# Patient Record
Sex: FEMALE | Race: WHITE | Employment: OTHER | URBAN - METROPOLITAN AREA
[De-identification: names, ages, dates, MRNs, and addresses within clinical notes are randomized per-mention and may not be internally consistent; named-entity substitution may affect disease eponyms.]

---

## 2017-01-03 ENCOUNTER — RADIANT APPOINTMENT (OUTPATIENT)
Dept: ULTRASOUND IMAGING | Facility: CLINIC | Age: 57
End: 2017-01-03
Attending: INTERNAL MEDICINE
Payer: COMMERCIAL

## 2017-01-03 ENCOUNTER — ALLIED HEALTH/NURSE VISIT (OUTPATIENT)
Dept: FAMILY MEDICINE | Facility: CLINIC | Age: 57
End: 2017-01-03
Payer: COMMERCIAL

## 2017-01-03 DIAGNOSIS — Z01.818 PREOPERATIVE EXAMINATION: Primary | ICD-10-CM

## 2017-01-03 DIAGNOSIS — E04.1 THYROID NODULE: ICD-10-CM

## 2017-01-03 PROCEDURE — 99207 ZZC NO CHARGE NURSE ONLY: CPT

## 2017-01-03 PROCEDURE — 88173 CYTOPATH EVAL FNA REPORT: CPT | Mod: 90 | Performed by: FAMILY MEDICINE

## 2017-01-03 PROCEDURE — 99000 SPECIMEN HANDLING OFFICE-LAB: CPT | Performed by: FAMILY MEDICINE

## 2017-01-03 PROCEDURE — 00000102 ZZHCL STATISTIC CYTO WRIGHT STAIN TC: Performed by: FAMILY MEDICINE

## 2017-01-03 PROCEDURE — 76942 ECHO GUIDE FOR BIOPSY: CPT | Performed by: STUDENT IN AN ORGANIZED HEALTH CARE EDUCATION/TRAINING PROGRAM

## 2017-01-03 PROCEDURE — 10022 US BIOPSY THYROID FINE NEEDLE ASPIRATION: CPT | Performed by: STUDENT IN AN ORGANIZED HEALTH CARE EDUCATION/TRAINING PROGRAM

## 2017-01-03 PROCEDURE — 93000 ELECTROCARDIOGRAM COMPLETE: CPT

## 2017-01-03 NOTE — PROGRESS NOTES
Patient was seen in the ultrasound today for a fine needle aspiration of a Right thyroid nodule.   Procedure was performed with NO complications.  Pain at start of procedure 0/10. Pain at end of procedure 0/10  Patient d/c with home care and follow-up instructions.  Lab to order: cfna

## 2017-01-04 LAB — COPATH REPORT: NORMAL

## 2017-01-10 ENCOUNTER — TRANSFERRED RECORDS (OUTPATIENT)
Dept: HEALTH INFORMATION MANAGEMENT | Facility: CLINIC | Age: 57
End: 2017-01-10

## 2017-01-10 NOTE — PROGRESS NOTES
Quick Note:    Dear Davida,     The final pathology is nondiagnostic due to lack of enough follicular cells. I am asking pathologist to see if they can review slide again but if this is truly nondiagnostic, I would prefer you repeat the FNA but with pathologist present at the time of biopsy to make sure they get enough sample. I will help coordinate this once I hear back from the pathology team.     Natalie Gutiérrez MD  [id number]  Endocrinology Service    ______

## 2017-01-12 ENCOUNTER — MYC MEDICAL ADVICE (OUTPATIENT)
Dept: ENDOCRINOLOGY | Facility: CLINIC | Age: 57
End: 2017-01-12

## 2017-01-12 DIAGNOSIS — E03.9 HYPOTHYROIDISM, UNSPECIFIED TYPE: Primary | ICD-10-CM

## 2017-01-13 ENCOUNTER — TELEPHONE (OUTPATIENT)
Dept: ENDOCRINOLOGY | Facility: CLINIC | Age: 57
End: 2017-01-13

## 2017-01-13 DIAGNOSIS — E04.1 THYROID NODULE: Primary | ICD-10-CM

## 2017-01-13 NOTE — TELEPHONE ENCOUNTER
"Lab report  Test was performed in [facility]   10/6/2016    TSH 2.6  Free T4 1  Free T3 2.9  Anti-  [id number]  FSH 64  LH  45  Cortisol 6.7  IGF-I 61  Total testosterone 2.9  Estradiol < 5     CBC 4.7  Hemoglobin 13.4  Platelets 214  Sodium 144  Potassium 4.1  Calcium 9.7  ALT 10  Creatinine 0.7  BUN 23  MALIK was negative    Other labs were scanned      Plan  Patient was called  ith subclinical hypothyroidism, treatment with levothyroxine or thyroid hormone is not indicated.  However patient states that the change has been \"night and day\" and I recommended  Following with her physician for that in that case.     we discussed difficulties with thyroid hormone extracts that may arise in the future and recommended changing it to levothyroxine.      She has nondiagnostic thyroid nodule biopsy.  We discussed about options of repeating it which is standard of care but she would like to obtain a sort of ultrasound for now.  Repeat biopsy with on-site pathologist was offered.    Thyroid ultrasound was ordered for her    Natalie Gutiérrez MD  [id number]  Endocrinology Service    "

## 2017-02-07 ENCOUNTER — AMBULATORY - HEALTHEAST (OUTPATIENT)
Dept: CARDIOLOGY | Facility: CLINIC | Age: 57
End: 2017-02-07

## 2017-02-20 ENCOUNTER — TRANSFERRED RECORDS (OUTPATIENT)
Dept: HEALTH INFORMATION MANAGEMENT | Facility: CLINIC | Age: 57
End: 2017-02-20

## 2017-02-28 ENCOUNTER — COMMUNICATION - HEALTHEAST (OUTPATIENT)
Dept: CARDIOLOGY | Facility: CLINIC | Age: 57
End: 2017-02-28

## 2017-02-28 DIAGNOSIS — I10 ESSENTIAL HYPERTENSION: ICD-10-CM

## 2017-03-01 ENCOUNTER — COMMUNICATION - HEALTHEAST (OUTPATIENT)
Dept: CARDIOLOGY | Facility: CLINIC | Age: 57
End: 2017-03-01

## 2017-03-01 DIAGNOSIS — I10 ESSENTIAL HYPERTENSION: ICD-10-CM

## 2017-03-10 ENCOUNTER — COMMUNICATION - HEALTHEAST (OUTPATIENT)
Dept: TELEHEALTH | Facility: CLINIC | Age: 57
End: 2017-03-10

## 2017-03-10 ENCOUNTER — AMBULATORY - HEALTHEAST (OUTPATIENT)
Dept: CARDIOLOGY | Facility: CLINIC | Age: 57
End: 2017-03-10

## 2017-03-10 ENCOUNTER — OFFICE VISIT - HEALTHEAST (OUTPATIENT)
Dept: CARDIOLOGY | Facility: CLINIC | Age: 57
End: 2017-03-10

## 2017-03-10 ENCOUNTER — TRANSFERRED RECORDS (OUTPATIENT)
Dept: HEALTH INFORMATION MANAGEMENT | Facility: CLINIC | Age: 57
End: 2017-03-10

## 2017-03-10 DIAGNOSIS — R07.9 CHEST PAIN, UNSPECIFIED TYPE: ICD-10-CM

## 2017-03-10 DIAGNOSIS — I10 ESSENTIAL HYPERTENSION: ICD-10-CM

## 2017-03-10 ASSESSMENT — MIFFLIN-ST. JEOR: SCORE: 1096.59

## 2017-04-17 ENCOUNTER — TRANSFERRED RECORDS (OUTPATIENT)
Dept: HEALTH INFORMATION MANAGEMENT | Facility: CLINIC | Age: 57
End: 2017-04-17

## 2017-04-21 ENCOUNTER — TELEPHONE (OUTPATIENT)
Dept: FAMILY MEDICINE | Facility: CLINIC | Age: 57
End: 2017-04-21

## 2017-04-21 NOTE — TELEPHONE ENCOUNTER
Patient was notified there was an order in there from Dr. Gutiérrez for US thyroid placed in 1/2017.  Patient was given the number to schedule.  Advised patient if she has any difficulty scheduling to call the clinic back.     Linda SANDERS RN

## 2017-04-21 NOTE — TELEPHONE ENCOUNTER
Reason for Call: Request for an order or referral:    Order or referral being requested: US Referral    Date needed: by next week    Has the patient been seen by the PCP for this problem? YES    Additional comments: She was suppose to have a thyroid US done first couple of months in 2017.  She has not sone that yet and is wondering if she can get an order to have this done at [place].  Please advise.    Phone number Patient can be reached at:  Home number on file [phone] (home)    Best Time:  any    Can we leave a detailed message on this number?  YES    Call taken on 4/21/2017 at 8:58 AM by Belkys Mera

## 2017-04-24 ENCOUNTER — HOSPITAL ENCOUNTER (OUTPATIENT)
Dept: ULTRASOUND IMAGING | Facility: CLINIC | Age: 57
Discharge: HOME | End: 2017-04-24
Attending: INTERNAL MEDICINE | Admitting: INTERNAL MEDICINE
Payer: COMMERCIAL

## 2017-04-24 DIAGNOSIS — E04.1 THYROID NODULE: ICD-10-CM

## 2017-04-24 PROCEDURE — 76536 US EXAM OF HEAD AND NECK: CPT

## 2017-04-25 NOTE — PROGRESS NOTES
Thyroid nodules are stable at this time and would recommend following this annually.     Natalie Gutiérrez MD  [id number]  Endocrinology Service

## 2017-05-31 ENCOUNTER — COMMUNICATION - HEALTHEAST (OUTPATIENT)
Dept: CARDIOLOGY | Facility: CLINIC | Age: 57
End: 2017-05-31

## 2017-05-31 DIAGNOSIS — I10 ESSENTIAL HYPERTENSION: ICD-10-CM

## 2017-06-01 ENCOUNTER — COMMUNICATION - HEALTHEAST (OUTPATIENT)
Dept: CARDIOLOGY | Facility: CLINIC | Age: 57
End: 2017-06-01

## 2017-06-01 DIAGNOSIS — I10 ESSENTIAL HYPERTENSION: ICD-10-CM

## 2017-06-21 DIAGNOSIS — N95.2 ATROPHIC VAGINITIS: ICD-10-CM

## 2017-06-21 NOTE — TELEPHONE ENCOUNTER
estradiol POWD      Last Written Prescription Date: 6/13/16  Last Fill Quantity: 45, # refills: 1  Last Office Visit with FMSRINATH, CALIX[facility] or [facility] prescribing provider: 12/15/16  Next 5 appointments (look out 90 days)     Jun 23, 2017  2:00 PM CDT   Return Visit with Natalie Gutiérrez MD   [facility]    [address] [place] [id number]   [phone]                   BP Readings from Last 3 Encounters:   12/30/16 125/72   12/15/16 142/89   10/10/16 120/84     Date of last Breast Exam: 6/30/16

## 2017-06-26 ENCOUNTER — COMMUNICATION - HEALTHEAST (OUTPATIENT)
Dept: CARDIOLOGY | Facility: CLINIC | Age: 57
End: 2017-06-26

## 2017-06-26 DIAGNOSIS — I10 ESSENTIAL HYPERTENSION: ICD-10-CM

## 2017-06-26 NOTE — TELEPHONE ENCOUNTER
"Davida said that she is using it vaginally twice a week. She said that  \"I am good with that.\"  Pardeep Flores RN    "

## 2017-06-26 NOTE — TELEPHONE ENCOUNTER
Please just ask if she is still taking this with her new homeopathic hormonal treatment? How often is she using it vaginally?  Paulina Zamora PA-C

## 2017-06-27 RX ORDER — ESTRADIOL 100 %
POWDER (GRAM) MISCELLANEOUS
Qty: 45 G | Refills: 1 | Status: SHIPPED | OUTPATIENT
Start: 2017-06-27 | End: 2018-06-27

## 2017-07-26 ENCOUNTER — TRANSFERRED RECORDS (OUTPATIENT)
Dept: HEALTH INFORMATION MANAGEMENT | Facility: CLINIC | Age: 57
End: 2017-07-26

## 2017-08-09 ENCOUNTER — OFFICE VISIT (OUTPATIENT)
Dept: FAMILY MEDICINE | Facility: CLINIC | Age: 57
End: 2017-08-09
Payer: COMMERCIAL

## 2017-08-09 VITALS
TEMPERATURE: 98.6 F | HEART RATE: 68 BPM | SYSTOLIC BLOOD PRESSURE: 123 MMHG | BODY MASS INDEX: 22.67 KG/M2 | WEIGHT: 123.2 LBS | HEIGHT: 62 IN | OXYGEN SATURATION: 98 % | DIASTOLIC BLOOD PRESSURE: 76 MMHG

## 2017-08-09 DIAGNOSIS — M20.12 HALLUX VALGUS, LEFT: ICD-10-CM

## 2017-08-09 DIAGNOSIS — Z01.818 PREOP GENERAL PHYSICAL EXAM: Primary | ICD-10-CM

## 2017-08-09 DIAGNOSIS — I20.1 PRINZMETAL ANGINA (H): ICD-10-CM

## 2017-08-09 DIAGNOSIS — M77.42 METATARSALGIA OF LEFT FOOT: ICD-10-CM

## 2017-08-09 DIAGNOSIS — I10 ESSENTIAL HYPERTENSION: ICD-10-CM

## 2017-08-09 LAB
ANION GAP SERPL CALCULATED.3IONS-SCNC: 5 MMOL/L (ref 3–14)
BUN SERPL-MCNC: 22 MG/DL (ref 7–30)
CALCIUM SERPL-MCNC: 8.8 MG/DL (ref 8.5–10.1)
CHLORIDE SERPL-SCNC: 105 MMOL/L (ref 94–109)
CO2 SERPL-SCNC: 30 MMOL/L (ref 20–32)
CREAT SERPL-MCNC: 0.85 MG/DL (ref 0.52–1.04)
GFR SERPL CREATININE-BSD FRML MDRD: 69 ML/MIN/1.7M2
GLUCOSE SERPL-MCNC: 86 MG/DL (ref 70–99)
HGB BLD-MCNC: 13.8 G/DL (ref 11.7–15.7)
POTASSIUM SERPL-SCNC: 3.8 MMOL/L (ref 3.4–5.3)
SODIUM SERPL-SCNC: 140 MMOL/L (ref 133–144)

## 2017-08-09 PROCEDURE — 36415 COLL VENOUS BLD VENIPUNCTURE: CPT | Performed by: PHYSICIAN ASSISTANT

## 2017-08-09 PROCEDURE — 93000 ELECTROCARDIOGRAM COMPLETE: CPT | Performed by: PHYSICIAN ASSISTANT

## 2017-08-09 PROCEDURE — 85018 HEMOGLOBIN: CPT | Performed by: PHYSICIAN ASSISTANT

## 2017-08-09 PROCEDURE — 99214 OFFICE O/P EST MOD 30 MIN: CPT | Performed by: PHYSICIAN ASSISTANT

## 2017-08-09 PROCEDURE — 80048 BASIC METABOLIC PNL TOTAL CA: CPT | Performed by: PHYSICIAN ASSISTANT

## 2017-08-09 NOTE — MR AVS SNAPSHOT
After Visit Summary   8/9/2017    Davida Mo    MRN: [id number]           Patient Information     Date Of Birth          1960        Visit Information        Provider Department      8/9/2017 1:00 PM Paulina Zamora PA-C [facility]        Today's Diagnoses     Preop general physical exam    -  1    Hallux valgus, left        Metatarsalgia of left foot        Prinzmetal angina (H)        Essential hypertension          Care Instructions      Before Your Surgery      Call your surgeon if there is any change in your health. This includes signs of a cold or flu (such as a sore throat, runny nose, cough, rash or fever).    Do not smoke, drink alcohol or take over the counter medicine (unless your surgeon or primary care doctor tells you to) for the 24 hours before and after surgery.    If you take prescribed drugs: Follow your doctor s orders about which medicines to take and which to stop until after surgery.    Eating and drinking prior to surgery: follow the instructions from your surgeon    Take a shower or bath the night before surgery. Use the soap your surgeon gave you to gently clean your skin. If you do not have soap from your surgeon, use your regular soap. Do not shave or scrub the surgery site.  Wear clean pajamas and have clean sheets on your bed.           Follow-ups after your visit        Who to contact     Normal or non-critical lab and imaging results will be communicated to you by [employer], letter or phone within 4 business days after the clinic has received the results. If you do not hear from us within 7 days, please contact the clinic through [employer] or phone. If you have a critical or abnormal lab result, we will notify you by phone as soon as possible.  Submit refill requests through [employer] or call your pharmacy and they will forward the refill request to us. Please allow 3 business days for your refill to be completed.          If you need to speak with a  " for additional information , please call: [phone]             Additional Information About Your Visit        MyChart Information     [employer] gives you secure access to your electronic health record. If you see a primary care provider, you can also send messages to your care team and make appointments. If you have questions, please call your primary care clinic.  If you do not have a primary care provider, please call [phone] and they will assist you.        Care EveryWhere ID     This is your Care EveryWhere ID. This could be used by other organizations to access your [facility] medical records  [id number]        Your Vitals Were     Pulse Temperature Height Pulse Oximetry BMI (Body Mass Index)       68 98.6  F (37  C) (Tympanic) 5' 1.5\" (1.562 m) 98% 22.9 kg/m2        Blood Pressure from Last 3 Encounters:   08/09/17 123/76   12/30/16 125/72   12/15/16 142/89    Weight from Last 3 Encounters:   08/09/17 123 lb 3.2 oz (55.9 kg)   12/30/16 126 lb 14.4 oz (57.6 kg)   12/15/16 124 lb 1.6 oz (56.3 kg)              We Performed the Following     EKG 12-lead complete w/read - Clinics        Primary Care Provider Office Phone # Fax #    Paulina Zamora PA-C [phone] [address] [facility] [address]        Equal Access to Services     [facility] : Hadii aad ku hadasho Soomaali, waaxda luqadaha, qaybta kaalmada adeegyada, suzanne lynch haydeb okeefe . So [facility] [phone].    ATENCIÓN: Si habla español, tiene a yan disposición servicios gratuitos de asistencia lingüística. Llame al [phone].    We comply with applicable federal civil rights laws and [place] laws. We do not discriminate on the basis of race, color, national origin, age, disability sex, sexual orientation or gender identity.            Thank you!     Thank you for choosing [facility]  for your care. Our goal is always to provide you with excellent care. Hearing back from " our patients is one way we can continue to improve our services. Please take a few minutes to complete the written survey that you may receive in the mail after your visit with us. Thank you!             Your Updated Medication List - Protect others around you: Learn how to safely use, store and throw away your medicines at www.disposemymeds.org.          This list is accurate as of: 8/9/17  1:50 PM.  Always use your most recent med list.                   Brand Name Dispense Instructions for use Diagnosis    albuterol 108 (90 BASE) MCG/ACT Inhaler    PROAIR HFA/PROVENTIL HFA/VENTOLIN HFA    1 Inhaler    Inhale 2 puffs into the lungs every 6 hours as needed for shortness of breath / dyspnea or wheezing    Cough       aspirin 81 MG EC tablet     90 tablet    Take 1 tablet (81 mg) by mouth daily        diltiazem 120 MG 24 hr capsule     30 capsule    Take 1 capsule (120 mg) by mouth daily    Essential hypertension       estradiol Powd     45 g    bio identical compounded cream 0.75MG/GM apply 0.5MG per vagina every other day    Atrophic vaginitis       metoprolol 50 MG 24 hr tablet    TOPROL-XL          NEW MED      100 mg 5-HTP 2 tablets at bedtime        nitroGLYcerin 0.4 MG sublingual tablet    NITROSTAT    25 tablet    Place 1 tablet (0.4 mg) under the tongue every 5 minutes as needed for chest pain    Prinzmetal angina (H)       * NONFORMULARY      Take 3 tablets by mouth daily Bone Strength        * NONFORMULARY           OMEGA-3 FISH OIL PO      Take 1 tsp. by mouth Daily in winter        OVER-THE-COUNTER      Lung Bronchial Sinus, Dr. Jesus by natural Factors        PROBIOTIC DAILY PO      85 billion live cultures,33 probiotic strains per capsule        STATIN NOT PRESCRIBED (INTENTIONAL)     0 each    1 each At Bedtime Statin not prescribed intentionally due to Allergy to statin - okay to stop by cardiology    Prinzmetal angina (H)       Thyroid 16.25 MG Tabs      Nature-thyroid        Ubiquinol 200 MG Caps       Take 100 mg by mouth once        VITAMIN K2-VITAMIN D3 PO      3 drops daily        * Notice:  This list has 2 medication(s) that are the same as other medications prescribed for you. Read the directions carefully, and ask your doctor or other care provider to review them with you.

## 2017-08-09 NOTE — PROGRESS NOTES
[facility]  [address] [facility] [address]  [phone]  Dept: [phone]    PRE-OP EVALUATION:  Today's date: 2017    Davida Mo (: 1960) presents for pre-operative evaluation assessment as requested by Dr. Mckinley Maki.  She requires evaluation and anesthesia risk assessment prior to undergoing surgery/procedure for treatment of Left Bunion .  Proposed procedure: Remove hardware Left bunion    Date of Surgery/ Procedure: 2017  Time of Surgery/ Procedure: 12:30 pm  Hospital/Surgical Facility:[facility]   Fax # [phone], [phone]   Primary Physician: Paulina Zamora  Type of Anesthesia Anticipated: General, Scalene Block    Patient has a Health Care Directive or Living Will:  NO     1. YES, heart attack - Do you have a history of heart attack, stroke, stent, bypass or surgery on an artery in the head, neck, heart or legs?  2. NO - Do you ever have any pain or discomfort in your chest?  3. NO - Do you have a history of  Heart Failure?  4. NO - Are you troubled by shortness of breath when: walking on the level, up a slight hill or at night?  5. NO - Do you currently have a cold, bronchitis or other respiratory infection?  6. NO - Do you have a cough, shortness of breath or wheezing?  7. NO - Do you sometimes get pains in the calves of your legs when you walk?  8. NO - Do you or anyone in your family have previous history of blood clots?  9. NO - Do you or does anyone in your family have a serious bleeding problem such as prolonged bleeding following surgeries or cuts?  10. NO - Have you ever had problems with anemia or been told to take iron pills?  11. NO - Have you had any abnormal blood loss such as black, tarry or bloody stools, or abnormal vaginal bleeding?  12. NO - Have you ever had a blood transfusion?  13. NO - Have you or any of your relatives ever had problems with anesthesia?  14. NO - Do you have sleep apnea, excessive snoring or  daytime drowsiness?  15. NO - Do you have any prosthetic heart valves?  16. NO - Do you have prosthetic joints?  17. NO - Is there any chance that you may be pregnant?      HPI:                                                      Brief HPI related to upcoming procedure: history of hallux valgus repair January 2017, needs removal of hardware due to pain    She is taking toprol 50 mg - feels better at this dose  From cardiology note 3/10/17:  Assessment/Recommendations   Patient with known history of wall motion normality is consistent with myocardial injury and a suspicion of coronary dissection in the obtuse system. She has been stable of late but a little bit fatigued. Her blood pressure is borderline today but I have asked her to cut her metoprolol in half and take 2 blood pressures each week for the next couple weeks and let us know where she is. If her blood pressure creeps up she will go back to her previous dose of metoprolol and I have asked her to give us a call. We could also substitute a different medication.    History of Present Illness   Ms. Davida BARRAGAN is a 56 y.o. female with known unusual coronary disease. She had documentation of a wall motion normality with no evidence of coronary artery disease and on repeat angiography had a suspicion of a dissection. She has been feeling well this past year and has not had any chest discomfort. She has had some foot surgery and some difficulties with panic attacks but that seems to be better with some natural medications that she is taking. Her cholesterols also come down with the addition of some natural thyroid replacement that she is taking. She has not had any syncope, near syncope, chest discomfort, orthopnea, paroxysmal nocturnal dyspnea, peripheral edema. . A little bit fatigued and wonders if she can come off of her metoprolol.    See problem list for active medical problems.  Problems all longstanding and stable, except as noted/documented.  See  ROS for pertinent symptoms related to these conditions.                                                                                                  .  HYPERTENSION - Patient has longstanding history of mod-severe HTN , currently denies any symptoms referable to elevated blood pressure. Specifically denies chest pain, palpitations, dyspnea, orthopnea, PND or peripheral edema. Blood pressure readings have been in normal range. Current medication regimen is as listed below. Patient denies any side effects of medication.                                                                                                                                                                                     HYPERLIPIDEMIA - Patient has a long history of Hyperlipidemiawith recent good control. She has discussed with cardiologist who was okay with no statin therapy.                                                                                                                                                     .  DEPRESSION - Patient has a long history of Depression of moderate severity requiring medication for control with recent symptoms being improved..Current symptoms of depression include none.                 CAD - patient has had dissection/Prinzmetal 2014. Patient denies recent chest pain or NTG use, denies exercise induced dyspnea or PND. CT angiogram 2014 - No atherosclerotic coronary artery disease. Small branch of OM has appearance suspicious of an old healed dissection. Normal LVEF 60% and EDP 4mmHg    MEDICAL HISTORY:                                                    Patient Active Problem List    Diagnosis Date Noted     Essential hypertension 09/24/2014     Priority: High     Prinzmetal angina (H) 06/30/2016     Priority: Medium     Thyroid nodule 08/24/2015     Priority: Medium     Chest pain 09/23/2014     Priority: Medium     Anxiety 12/11/2012     Priority: Medium     Lentigo 12/11/2012     Priority:  Medium     Seborrheic keratosis 12/11/2012     Priority: Medium     Atrophic vaginitis 03/18/2014     Priority: Low      Past Medical History:   Diagnosis Date     Arthritis      Depression     on meds from 96 to 06. no symptoms have returned     Hypertension 11/09     NSTEMI (non-ST elevated myocardial infarction) (H) 2011,9/23/2014    see care everywhere     Prinzmetal angina (H)     first heart attack. prinzmetal angina diagnosis     Past Surgical History:   Procedure Laterality Date     ABDOMEN SURGERY  1987    tubal ligation     TUBAL LIGATION       Current Outpatient Prescriptions   Medication Sig Dispense Refill     estradiol POWD bio identical compounded cream 0.75MG/GM apply 0.5MG per vagina every other day 45 g 1     Thyroid 16.25 MG TABS Nature-thyroid       Probiotic Product (PROBIOTIC DAILY PO) 85 billion live cultures,33 probiotic strains per capsule       metoprolol (TOPROL-XL) 50 MG 24 hr tablet        OVER-THE-COUNTER Lung Bronchial Sinus, Dr. Jesus by natural Factors       diltiazem 120 MG 24 hr CD capsule Take 1 capsule (120 mg) by mouth daily 30 capsule 0     aspirin 81 MG EC tablet Take 1 tablet (81 mg) by mouth daily 90 tablet 3     NONFORMULARY Take 3 tablets by mouth daily Bone Strength       Omega-3 Fatty Acids (OMEGA-3 FISH OIL PO) Take 1 tsp. by mouth Daily in winter       STATIN NOT PRESCRIBED, INTENTIONAL, 1 each At Bedtime Statin not prescribed intentionally due to Allergy to statin - okay to stop by cardiology 0 each 0     Vitamin D-Vitamin K (VITAMIN K2-VITAMIN D3 PO) 3 drops daily       albuterol (PROAIR HFA/PROVENTIL HFA/VENTOLIN HFA) 108 (90 BASE) MCG/ACT Inhaler Inhale 2 puffs into the lungs every 6 hours as needed for shortness of breath / dyspnea or wheezing 1 Inhaler 0     NEW  mg 5-HTP 2 tablets at bedtime       NONFORMULARY        nitroglycerin (NITROSTAT) 0.4 MG SL tablet Place 1 tablet (0.4 mg) under the tongue every 5 minutes as needed for chest pain 25 tablet 1  "    Ubiquinol 200 MG CAPS Take 100 mg by mouth once        OTC products: None, except as noted above. She will stop fish oil and baby aspirin 5 days prior    Allergies   Allergen Reactions     Remeron [Mirtazapine] Rash     Patient states that medication made her extremly tired, felt like she could not function until late afternoon.  Also noted having rash all over body      Latex Allergy: NO    Social History   Substance Use Topics     Smoking status: Former Smoker     Years: 10.00     Types: Cigarettes     Start date: 9/10/1972     Quit date: 9/23/1991     Smokeless tobacco: Not on file     Alcohol use Yes      Comment: Occasionally     History   Drug Use No       REVIEW OF SYSTEMS:                                                    C: NEGATIVE for fever, chills, change in weight  I: NEGATIVE for worrisome rashes, moles or lesions  E: NEGATIVE for vision changes or irritation  E/M: NEGATIVE for ear, mouth and throat problems  R: NEGATIVE for significant cough or SOB  B: NEGATIVE for masses, tenderness or discharge  CV: NEGATIVE for chest pain, palpitations or peripheral edema  GI: NEGATIVE for nausea, abdominal pain, heartburn, or change in bowel habits  : NEGATIVE for frequency, dysuria, or hematuria  M: NEGATIVE for significant arthralgias or myalgia POSITIVE left foot pain  N: NEGATIVE for weakness, dizziness or paresthesias  E: NEGATIVE for temperature intolerance, skin/hair changes  H: NEGATIVE for bleeding problems  P: NEGATIVE for changes in mood or affect    EXAM:                                                    /76 (BP Location: Right arm, Patient Position: Sitting, Cuff Size: Adult Regular)  Pulse 68  Temp 98.6  F (37  C) (Tympanic)  Ht 5' 1.5\" (1.562 m)  Wt 123 lb 3.2 oz (55.9 kg)  SpO2 98%  BMI 22.9 kg/m2    GENERAL APPEARANCE: healthy, alert and no distress     EYES: EOMI,  PERRL     HENT: ear canals and TM's normal and nose and mouth without ulcers or lesions     NECK: no adenopathy, " no asymmetry, masses, or scars and thyroid normal to palpation     RESP: lungs clear to auscultation - no rales, rhonchi or wheezes     CV: regular rates and rhythm, normal S1 S2, no S3 or S4 and no murmur, click or rub     ABDOMEN:  soft, nontender, no HSM or masses and bowel sounds normal     MS: extremities normal- no gross deformities noted, no evidence of inflammation in joints, FROM in all extremities.     SKIN: no suspicious lesions or rashes     NEURO: Normal strength and tone, sensory exam grossly normal, mentation intact and speech normal     PSYCH: mentation appears normal. and affect normal/bright     LYMPHATICS: No axillary, cervical, or supraclavicular nodes    DIAGNOSTICS:                                                      EKG: appears normal, NSR, normal axis, normal intervals, no acute ST/T changes c/w ischemia, no LVH by voltage criteria, unchanged from previous tracings  Labs Resulted Today:   Results for orders placed or performed in visit on 08/09/17   Hemoglobin   Result Value Ref Range    Hemoglobin 13.8 11.7 - 15.7 g/dL   Basic metabolic panel   Result Value Ref Range    Sodium 140 133 - 144 mmol/L    Potassium 3.8 3.4 - 5.3 mmol/L    Chloride 105 94 - 109 mmol/L    Carbon Dioxide 30 20 - 32 mmol/L    Anion Gap 5 3 - 14 mmol/L    Glucose 86 70 - 99 mg/dL    Urea Nitrogen 22 7 - 30 mg/dL    Creatinine 0.85 0.52 - 1.04 mg/dL    GFR Estimate 69 >60 mL/min/1.7m2    GFR Estimate If Black 83 >60 mL/min/1.7m2    Calcium 8.8 8.5 - 10.1 mg/dL     Labs Drawn and in Process:   Unresulted Labs Ordered in the Past 30 Days of this Admission     No orders found for last 61 day(s).          Recent Labs   Lab Test  12/15/16   1214  07/07/16   1013   09/23/14   1540   HGB  13.9   --    --   13.5   PLT   --    --    --   237   NA  140  143   < >  142   POTASSIUM  4.0  3.7   < >  3.7   CR  0.81  0.91   < >  0.89    < > = values in this interval not displayed.        IMPRESSION:                                                     Reason for surgery/procedure: hallux valgus correction / hardware removal  Diagnosis/reason for consult: preoperative exam    The proposed surgical procedure is considered INTERMEDIATE risk.    REVISED CARDIAC RISK INDEX  The patient has the following serious cardiovascular risks for perioperative complications such as (MI, PE, VFib and 3  AV Block):  Coronary Artery Disease (MI, positive stress test, angina, Qs on EKG)  INTERPRETATION: 1 risks: Class II (low risk - 0.9% complication rate)    The patient has the following additional risks for perioperative complications:  No identified additional risks  The 10-year ASCVD risk score (Treyadriana WILDER Jr, et al., 2013) is: 2.2%    Values used to calculate the score:      Age: 56 years      Sex: Female      Is Non- : No      Diabetic: No      Tobacco smoker: No      Systolic Blood Pressure: 123 mmHg      Is BP treated: Yes      HDL Cholesterol: 62 mg/dL      Total Cholesterol: 161 mg/dL      ICD-10-CM    1. Preop general physical exam Z01.818 EKG 12-lead complete w/read - Clinics     Hemoglobin     Basic metabolic panel   2. Hallux valgus, left M20.12 EKG 12-lead complete w/read - Clinics     Hemoglobin   3. Metatarsalgia of left foot M77.42    4. Prinzmetal angina (H) I20.1 EKG 12-lead complete w/read - Clinics   5. Essential hypertension I10 EKG 12-lead complete w/read - Clinics     Basic metabolic panel       RECOMMENDATIONS:                                                        Cardiovascular Risk  Performs 4 METs exercise without symptoms  Patient is already on a Beta Blocker. Continue Betablocker therapy after surgery, using Beta blocker order set as necessary for NPO status.    --Patient is to take all scheduled medications on the day of surgery EXCEPT for modifications listed below.    Anticoagulant or Antiplatelet Medication Use  ASPIRIN: patient was told by surgery to d/c aspirin and fish oil 5 days prior to surgery       APPROVAL GIVEN to proceed with proposed procedure, without further diagnostic evaluation       Signed Electronically by: Paulina Zamora PA-C    Copy of this evaluation report is provided to requesting physician.    [facility] Preop Guidelines

## 2017-09-08 ENCOUNTER — TRANSFERRED RECORDS (OUTPATIENT)
Dept: HEALTH INFORMATION MANAGEMENT | Facility: CLINIC | Age: 57
End: 2017-09-08

## 2017-11-17 ENCOUNTER — COMMUNICATION - HEALTHEAST (OUTPATIENT)
Dept: CARDIOLOGY | Facility: CLINIC | Age: 57
End: 2017-11-17

## 2017-11-17 DIAGNOSIS — I10 ESSENTIAL HYPERTENSION: ICD-10-CM

## 2017-12-13 ENCOUNTER — TELEPHONE (OUTPATIENT)
Dept: FAMILY MEDICINE | Facility: CLINIC | Age: 57
End: 2017-12-13

## 2017-12-13 DIAGNOSIS — E04.1 THYROID NODULE: Primary | ICD-10-CM

## 2017-12-13 NOTE — TELEPHONE ENCOUNTER
"Last year she was followed by endocrinology, after last ultrasound endocrinologist recommended : \"Thyroid nodules are stable at this time and would recommend following this annually. \"  So she is not due to repeat ultrasound until April - I did place the order.   She should have thyroid function testing repeated as well.  Paulina Zamora PA-C   "

## 2017-12-13 NOTE — TELEPHONE ENCOUNTER
Patient notified of all of Kirstin's instructions as noted below. Patient agreed with plan.  Lab only appointment made for 12/15/17.   Pardeep Flores RN

## 2017-12-13 NOTE — TELEPHONE ENCOUNTER
Reason for Call: Request for an order or referral:    Order or referral being requested: Davida calling to request orders for US for her thyroid.  She states that she is past due and has one every 6 months.  Could you please review and place orders if appropriate.  She'd like to be called when orders have been placed.  Thank you..Elizabeth Duffy    Date needed: as soon as possible    Has the patient been seen by the PCP for this problem? Not Applicable    Additional comments: none    Phone number Patient can be reached at:  Home number on file [phone] (home)    Best Time:  Any time    Can we leave a detailed message on this number?  YES    Call taken on 12/13/2017 at 11:09 AM by Elizabeth Duffy

## 2017-12-15 DIAGNOSIS — E04.1 THYROID NODULE: ICD-10-CM

## 2017-12-15 LAB — TSH SERPL DL<=0.005 MIU/L-ACNC: 2.89 MU/L (ref 0.4–4)

## 2017-12-15 PROCEDURE — 36415 COLL VENOUS BLD VENIPUNCTURE: CPT | Performed by: PHYSICIAN ASSISTANT

## 2017-12-15 PROCEDURE — 84443 ASSAY THYROID STIM HORMONE: CPT | Performed by: PHYSICIAN ASSISTANT

## 2018-01-12 ENCOUNTER — COMMUNICATION - HEALTHEAST (OUTPATIENT)
Dept: CARDIOLOGY | Facility: CLINIC | Age: 58
End: 2018-01-12

## 2018-01-12 DIAGNOSIS — I10 ESSENTIAL HYPERTENSION: ICD-10-CM

## 2018-03-08 ENCOUNTER — COMMUNICATION - HEALTHEAST (OUTPATIENT)
Dept: ADMINISTRATIVE | Facility: CLINIC | Age: 58
End: 2018-03-08

## 2018-04-13 ENCOUNTER — COMMUNICATION - HEALTHEAST (OUTPATIENT)
Dept: CARDIOLOGY | Facility: CLINIC | Age: 58
End: 2018-04-13

## 2018-04-25 ENCOUNTER — HOSPITAL ENCOUNTER (OUTPATIENT)
Dept: ULTRASOUND IMAGING | Facility: CLINIC | Age: 58
Discharge: HOME | End: 2018-04-25
Attending: PHYSICIAN ASSISTANT | Admitting: PHYSICIAN ASSISTANT
Payer: COMMERCIAL

## 2018-04-25 DIAGNOSIS — E04.1 THYROID NODULE: ICD-10-CM

## 2018-04-25 PROCEDURE — 76536 US EXAM OF HEAD AND NECK: CPT

## 2018-05-10 ENCOUNTER — COMMUNICATION - HEALTHEAST (OUTPATIENT)
Dept: TELEHEALTH | Facility: CLINIC | Age: 58
End: 2018-05-10

## 2018-05-10 ENCOUNTER — TRANSFERRED RECORDS (OUTPATIENT)
Dept: HEALTH INFORMATION MANAGEMENT | Facility: CLINIC | Age: 58
End: 2018-05-10

## 2018-05-10 ENCOUNTER — OFFICE VISIT - HEALTHEAST (OUTPATIENT)
Dept: CARDIOLOGY | Facility: CLINIC | Age: 58
End: 2018-05-10

## 2018-05-10 DIAGNOSIS — I25.42 DISSECTION OF CORONARY ARTERY: ICD-10-CM

## 2018-05-10 DIAGNOSIS — I10 ESSENTIAL HYPERTENSION: ICD-10-CM

## 2018-05-10 ASSESSMENT — MIFFLIN-ST. JEOR: SCORE: 1093.19

## 2018-05-13 ENCOUNTER — COMMUNICATION - HEALTHEAST (OUTPATIENT)
Dept: CARDIOLOGY | Facility: CLINIC | Age: 58
End: 2018-05-13

## 2018-05-13 DIAGNOSIS — I10 ESSENTIAL HYPERTENSION: ICD-10-CM

## 2018-05-17 PROCEDURE — 82274 ASSAY TEST FOR BLOOD FECAL: CPT | Performed by: PHYSICIAN ASSISTANT

## 2018-05-23 DIAGNOSIS — Z12.11 SPECIAL SCREENING FOR MALIGNANT NEOPLASMS, COLON: ICD-10-CM

## 2018-05-23 LAB — HEMOCCULT STL QL IA: NEGATIVE

## 2018-06-20 ENCOUNTER — COMMUNICATION - HEALTHEAST (OUTPATIENT)
Dept: CARDIOLOGY | Facility: CLINIC | Age: 58
End: 2018-06-20

## 2018-06-20 DIAGNOSIS — I10 ESSENTIAL HYPERTENSION: ICD-10-CM

## 2018-06-22 DIAGNOSIS — E03.9 MYXEDEMA HEART DISEASE: Primary | ICD-10-CM

## 2018-06-22 DIAGNOSIS — I51.9 MYXEDEMA HEART DISEASE: Primary | ICD-10-CM

## 2018-06-22 LAB
T3FREE SERPL-MCNC: 2.3 PG/ML (ref 2.3–4.2)
T4 FREE SERPL-MCNC: 0.75 NG/DL (ref 0.76–1.46)
TSH SERPL DL<=0.005 MIU/L-ACNC: 2.77 MU/L (ref 0.4–4)

## 2018-06-22 PROCEDURE — 84481 FREE ASSAY (FT-3): CPT | Performed by: FAMILY MEDICINE

## 2018-06-22 PROCEDURE — 36415 COLL VENOUS BLD VENIPUNCTURE: CPT | Performed by: FAMILY MEDICINE

## 2018-06-22 PROCEDURE — 84443 ASSAY THYROID STIM HORMONE: CPT | Performed by: FAMILY MEDICINE

## 2018-06-22 PROCEDURE — 84439 ASSAY OF FREE THYROXINE: CPT | Performed by: FAMILY MEDICINE

## 2018-06-26 ENCOUNTER — TELEPHONE (OUTPATIENT)
Dept: FAMILY MEDICINE | Facility: CLINIC | Age: 58
End: 2018-06-26

## 2018-06-26 DIAGNOSIS — N95.2 ATROPHIC VAGINITIS: ICD-10-CM

## 2018-06-26 NOTE — TELEPHONE ENCOUNTER
"Kirstin: received fax from [facility] regarding C-Estrdiol 0.75mg/gm Vagcr; Directions: Insert 1 gram vaginally every other day:     \"Pt says she only takes 1/2 gram not 1 gram, please confirm. Thank you.\"    Pardeep Flores RN    "

## 2018-06-27 RX ORDER — ESTRADIOL 100 %
POWDER (GRAM) MISCELLANEOUS
Qty: 45 G | Refills: 1 | Status: SHIPPED | OUTPATIENT
Start: 2018-06-27 | End: 2019-06-14

## 2018-11-20 ENCOUNTER — COMMUNICATION - HEALTHEAST (OUTPATIENT)
Dept: CARDIOLOGY | Facility: CLINIC | Age: 58
End: 2018-11-20

## 2018-11-20 DIAGNOSIS — I10 ESSENTIAL HYPERTENSION: ICD-10-CM

## 2019-01-21 ENCOUNTER — TELEPHONE (OUTPATIENT)
Dept: FAMILY MEDICINE | Facility: CLINIC | Age: 59
End: 2019-01-21

## 2019-01-21 ENCOUNTER — HOSPITAL ENCOUNTER (EMERGENCY)
Facility: CLINIC | Age: 59
Discharge: HOME | End: 2019-01-21
Attending: FAMILY MEDICINE | Admitting: FAMILY MEDICINE
Payer: COMMERCIAL

## 2019-01-21 ENCOUNTER — APPOINTMENT (OUTPATIENT)
Dept: CT IMAGING | Facility: CLINIC | Age: 59
End: 2019-01-21
Payer: COMMERCIAL

## 2019-01-21 VITALS
HEIGHT: 62 IN | BODY MASS INDEX: 23 KG/M2 | SYSTOLIC BLOOD PRESSURE: 143 MMHG | WEIGHT: 125 LBS | RESPIRATION RATE: 18 BRPM | OXYGEN SATURATION: 97 % | HEART RATE: 65 BPM | DIASTOLIC BLOOD PRESSURE: 100 MMHG | TEMPERATURE: 97.5 F

## 2019-01-21 DIAGNOSIS — M54.50 ACUTE BILATERAL LOW BACK PAIN WITHOUT SCIATICA: ICD-10-CM

## 2019-01-21 DIAGNOSIS — R10.84 ABDOMINAL PAIN, GENERALIZED: ICD-10-CM

## 2019-01-21 LAB
ALBUMIN SERPL-MCNC: 3.9 G/DL (ref 3.4–5)
ALBUMIN UR-MCNC: NEGATIVE MG/DL
ALP SERPL-CCNC: 93 U/L (ref 40–150)
ALT SERPL W P-5'-P-CCNC: 17 U/L (ref 0–50)
ANION GAP SERPL CALCULATED.3IONS-SCNC: 7 MMOL/L (ref 3–14)
APPEARANCE UR: CLEAR
AST SERPL W P-5'-P-CCNC: 20 U/L (ref 0–45)
BASOPHILS # BLD AUTO: 0 10E9/L (ref 0–0.2)
BASOPHILS NFR BLD AUTO: 0.2 %
BILIRUB SERPL-MCNC: 0.3 MG/DL (ref 0.2–1.3)
BILIRUB UR QL STRIP: NEGATIVE
BUN SERPL-MCNC: 23 MG/DL (ref 7–30)
CALCIUM SERPL-MCNC: 8.4 MG/DL (ref 8.5–10.1)
CHLORIDE SERPL-SCNC: 105 MMOL/L (ref 94–109)
CO2 SERPL-SCNC: 29 MMOL/L (ref 20–32)
COLOR UR AUTO: ABNORMAL
CREAT SERPL-MCNC: 0.94 MG/DL (ref 0.52–1.04)
DIFFERENTIAL METHOD BLD: NORMAL
EOSINOPHIL # BLD AUTO: 0.2 10E9/L (ref 0–0.7)
EOSINOPHIL NFR BLD AUTO: 3.9 %
ERYTHROCYTE [DISTWIDTH] IN BLOOD BY AUTOMATED COUNT: 12.5 % (ref 10–15)
GFR SERPL CREATININE-BSD FRML MDRD: 67 ML/MIN/{1.73_M2}
GLUCOSE SERPL-MCNC: 84 MG/DL (ref 70–99)
GLUCOSE UR STRIP-MCNC: NEGATIVE MG/DL
HCT VFR BLD AUTO: 41.5 % (ref 35–47)
HGB BLD-MCNC: 13.4 G/DL (ref 11.7–15.7)
HGB UR QL STRIP: ABNORMAL
IMM GRANULOCYTES # BLD: 0 10E9/L (ref 0–0.4)
IMM GRANULOCYTES NFR BLD: 0.2 %
KETONES UR STRIP-MCNC: 20 MG/DL
LACTATE BLD-SCNC: 0.7 MMOL/L (ref 0.7–2)
LEUKOCYTE ESTERASE UR QL STRIP: NEGATIVE
LIPASE SERPL-CCNC: 134 U/L (ref 73–393)
LYMPHOCYTES # BLD AUTO: 1.6 10E9/L (ref 0.8–5.3)
LYMPHOCYTES NFR BLD AUTO: 36.9 %
MCH RBC QN AUTO: 31 PG (ref 26.5–33)
MCHC RBC AUTO-ENTMCNC: 32.3 G/DL (ref 31.5–36.5)
MCV RBC AUTO: 96 FL (ref 78–100)
MONOCYTES # BLD AUTO: 0.5 10E9/L (ref 0–1.3)
MONOCYTES NFR BLD AUTO: 11.4 %
MUCOUS THREADS #/AREA URNS LPF: PRESENT /LPF
NEUTROPHILS # BLD AUTO: 2.1 10E9/L (ref 1.6–8.3)
NEUTROPHILS NFR BLD AUTO: 47.4 %
NITRATE UR QL: NEGATIVE
NRBC # BLD AUTO: 0 10*3/UL
NRBC BLD AUTO-RTO: 0 /100
PH UR STRIP: 7 PH (ref 5–7)
PLATELET # BLD AUTO: 233 10E9/L (ref 150–450)
POTASSIUM SERPL-SCNC: 3.6 MMOL/L (ref 3.4–5.3)
PROT SERPL-MCNC: 6.8 G/DL (ref 6.8–8.8)
RBC # BLD AUTO: 4.32 10E12/L (ref 3.8–5.2)
RBC #/AREA URNS AUTO: 13 /HPF (ref 0–2)
SODIUM SERPL-SCNC: 141 MMOL/L (ref 133–144)
SOURCE: ABNORMAL
SP GR UR STRIP: 1.01 (ref 1–1.03)
SQUAMOUS #/AREA URNS AUTO: <1 /HPF (ref 0–1)
UROBILINOGEN UR STRIP-MCNC: 0 MG/DL (ref 0–2)
WBC # BLD AUTO: 4.4 10E9/L (ref 4–11)
WBC #/AREA URNS AUTO: 1 /HPF (ref 0–5)

## 2019-01-21 PROCEDURE — 85025 COMPLETE CBC W/AUTO DIFF WBC: CPT | Performed by: FAMILY MEDICINE

## 2019-01-21 PROCEDURE — 74177 CT ABD & PELVIS W/CONTRAST: CPT

## 2019-01-21 PROCEDURE — 83690 ASSAY OF LIPASE: CPT | Performed by: FAMILY MEDICINE

## 2019-01-21 PROCEDURE — 80053 COMPREHEN METABOLIC PANEL: CPT | Performed by: FAMILY MEDICINE

## 2019-01-21 PROCEDURE — 99284 EMERGENCY DEPT VISIT MOD MDM: CPT | Mod: Z6 | Performed by: FAMILY MEDICINE

## 2019-01-21 PROCEDURE — 96374 THER/PROPH/DIAG INJ IV PUSH: CPT

## 2019-01-21 PROCEDURE — 25000125 ZZHC RX 250: Performed by: FAMILY MEDICINE

## 2019-01-21 PROCEDURE — 25000128 H RX IP 250 OP 636: Performed by: FAMILY MEDICINE

## 2019-01-21 PROCEDURE — 83605 ASSAY OF LACTIC ACID: CPT | Performed by: FAMILY MEDICINE

## 2019-01-21 PROCEDURE — 81001 URINALYSIS AUTO W/SCOPE: CPT | Performed by: FAMILY MEDICINE

## 2019-01-21 PROCEDURE — 99284 EMERGENCY DEPT VISIT MOD MDM: CPT | Mod: 25

## 2019-01-21 RX ORDER — KETOROLAC TROMETHAMINE 15 MG/ML
15 INJECTION, SOLUTION INTRAMUSCULAR; INTRAVENOUS ONCE
Status: COMPLETED | OUTPATIENT
Start: 2019-01-21 | End: 2019-01-21

## 2019-01-21 RX ORDER — IOPAMIDOL 755 MG/ML
61 INJECTION, SOLUTION INTRAVASCULAR ONCE
Status: COMPLETED | OUTPATIENT
Start: 2019-01-21 | End: 2019-01-21

## 2019-01-21 RX ORDER — CYCLOBENZAPRINE HCL 10 MG
10 TABLET ORAL
Qty: 10 TABLET | Refills: 0 | Status: SHIPPED | OUTPATIENT
Start: 2019-01-21 | End: 2019-06-03

## 2019-01-21 RX ORDER — MORPHINE SULFATE 15 MG/1
15 TABLET, FILM COATED, EXTENDED RELEASE ORAL EVERY 12 HOURS PRN
COMMUNITY
Start: 2017-01-06 | End: 2019-06-03

## 2019-01-21 RX ORDER — LEVOTHYROXINE, LIOTHYRONINE 19; 4.5 UG/1; UG/1
1 TABLET ORAL DAILY
COMMUNITY
Start: 2018-12-13 | End: 2019-06-03

## 2019-01-21 RX ADMIN — KETOROLAC TROMETHAMINE 15 MG: 15 INJECTION, SOLUTION INTRAMUSCULAR; INTRAVENOUS at 21:19

## 2019-01-21 RX ADMIN — SODIUM CHLORIDE 55 ML: 9 INJECTION, SOLUTION INTRAVENOUS at 19:36

## 2019-01-21 RX ADMIN — IOPAMIDOL 61 ML: 755 INJECTION, SOLUTION INTRAVENOUS at 19:35

## 2019-01-21 ASSESSMENT — ENCOUNTER SYMPTOMS
VOMITING: 0
FEVER: 0
CHILLS: 0
NAUSEA: 1
ABDOMINAL PAIN: 1
SORE THROAT: 0
COUGH: 0
WHEEZING: 0
FREQUENCY: 0
PALPITATIONS: 0
BLOOD IN STOOL: 0
DIARRHEA: 0
BACK PAIN: 1
HEADACHES: 0
CONSTIPATION: 0
DIAPHORESIS: 0
SHORTNESS OF BREATH: 0
SINUS PRESSURE: 0
DYSURIA: 0

## 2019-01-21 ASSESSMENT — MIFFLIN-ST. JEOR: SCORE: 1095.25

## 2019-01-21 NOTE — ED PROVIDER NOTES
History     Chief Complaint   Patient presents with     Back Pain     L side, starts at noc and goes away after pain meds.     HPI  Davida Mo is a 58 year old female who presents with entire low back, bilateral - severe pain onset on thursday PM and persisted overnight unable to get sleep.    no radicular symptoms. No back injury  No symptoms suggestive of  cauda equina syndrome (central spinal stenosis) such as incontinence or retention of urine or stool, inner thigh numbness or foot drop.    radiating pain into the anterior abdomen - cramping in waves, bilateral left > right and primarily lower abd pelvis.  Nausea without vomiting. The patient denies dysuria, frequency or hematuria.    The patient denies anorexia, vomiting, dysphagia, diarrhea, constipation  no bright red blood per rectum or melana in the stool  No abd injuries.  s/p tubal ligation. still has appendix, GB    fatigued, chills  pain resulted in sense of dyspnea    now with 4 days of symptoms.  has taken 30 mg Morphine has at home from prior surgery.  Used also percocet tab for pain - also available from prior surgery    Allergies:  Allergies   Allergen Reactions     Remeron [Mirtazapine] Rash     Patient states that medication made her extremly tired, felt like she could not function until late afternoon.  Also noted having rash all over body       Problem List:    Patient Active Problem List    Diagnosis Date Noted     Prinzmetal angina (H) 06/30/2016     Priority: Medium     Thyroid nodule 08/24/2015     Priority: Medium     Essential hypertension 09/24/2014     Priority: Medium     Chest pain 09/23/2014     Priority: Medium     Atrophic vaginitis 03/18/2014     Priority: Medium     Anxiety 12/11/2012     Priority: Medium     Lentigo 12/11/2012     Priority: Medium     Seborrheic keratosis 12/11/2012     Priority: Medium        Past Medical History:    Past Medical History:   Diagnosis Date     Arthritis      Depression      Hypertension       NSTEMI (non-ST elevated myocardial infarction) (H) ,2014     Prinzmetal angina (H)        Past Surgical History:    Past Surgical History:   Procedure Laterality Date     ABDOMEN SURGERY      tubal ligation     TUBAL LIGATION         Family History:    Family History   Problem Relation Age of Onset     Cancer Sister 67        Lung Cancer, removal of left lower lung     Thyroid Disease Sister      Other Cancer Sister      Thyroid Disease Sister      Diabetes Mother      Hypertension Mother      Allergies Mother      Arthritis Mother      Cardiovascular Mother      Eye Disorder Mother      Gastrointestinal Disease Mother      Osteoporosis Mother      Asthma Mother      Cardiovascular Father      Heart Disease Father      Alcohol/Drug Father      Cancer Sister      Neurologic Disorder Sister      Breast Cancer No family hx of        Social History:  Marital Status:   [2]  Social History     Tobacco Use     Smoking status: Former Smoker     Years: 10.00     Types: Cigarettes     Start date: 9/10/1972     Last attempt to quit: 1991     Years since quittin.3   Substance Use Topics     Alcohol use: Yes     Comment: Occasionally     Drug use: No        Medications:      albuterol (PROAIR HFA/PROVENTIL HFA/VENTOLIN HFA) 108 (90 BASE) MCG/ACT Inhaler   aspirin 81 MG EC tablet   diltiazem 120 MG 24 hr CD capsule   estradiol POWD   metoprolol (TOPROL-XL) 50 MG 24 hr tablet   NEW MED   nitroglycerin (NITROSTAT) 0.4 MG SL tablet   NONFORMULARY   NONFORMULARY   Omega-3 Fatty Acids (OMEGA-3 FISH OIL PO)   OVER-THE-COUNTER   Probiotic Product (PROBIOTIC DAILY PO)   STATIN NOT PRESCRIBED, INTENTIONAL,   Thyroid 16.25 MG TABS   Ubiquinol 200 MG CAPS   Vitamin D-Vitamin K (VITAMIN K2-VITAMIN D3 PO)         Review of Systems   Constitutional: Negative for chills, diaphoresis and fever.   HENT: Negative for ear pain, sinus pressure and sore throat.    Eyes: Negative for visual disturbance.  "  Respiratory: Negative for cough, shortness of breath and wheezing.    Cardiovascular: Negative for chest pain and palpitations.   Gastrointestinal: Positive for abdominal pain and nausea. Negative for blood in stool, constipation, diarrhea and vomiting.   Genitourinary: Negative for dysuria, frequency and urgency.   Musculoskeletal: Positive for back pain.   Skin: Negative for rash.   Neurological: Negative for headaches.   All other systems reviewed and are negative.      Physical Exam   BP: (!) 179/94  Pulse: 68  Temp: 97.5  F (36.4  C)  Resp: 18  Height: 157.5 cm (5' 2\")  Weight: 56.7 kg (125 lb)  SpO2: 98 %      Physical Exam   Constitutional: No distress.   HENT:   Mouth/Throat: Oropharynx is clear and moist.   Eyes: Conjunctivae and EOM are normal. Pupils are equal, round, and reactive to light.   Neck: Normal range of motion. Neck supple.   Cardiovascular: Normal rate, regular rhythm and normal heart sounds. Exam reveals no friction rub.   No murmur heard.  Pulmonary/Chest: Effort normal and breath sounds normal. No stridor. No respiratory distress. She has no wheezes.   Abdominal: She exhibits no distension and no mass. There is tenderness. There is no guarding.   Musculoskeletal: She exhibits no edema.        Lumbar back: She exhibits normal range of motion, no tenderness, no bony tenderness, no swelling, no edema, no deformity, no laceration, no pain and no spasm.   Neurological: She is alert. She displays normal reflexes. No cranial nerve deficit or sensory deficit. She exhibits normal muscle tone.   Skin: No rash noted. She is not diaphoretic.     straight leg raise negative  si joint testing figure-4 negative  hip/knee ROM normal    ED Course        Procedures               Critical Care time:  none               Results for orders placed or performed during the hospital encounter of 01/21/19 (from the past 24 hour(s))   CBC with platelets differential   Result Value Ref Range    WBC 4.4 4.0 - 11.0 " 10e9/L    RBC Count 4.32 3.8 - 5.2 10e12/L    Hemoglobin 13.4 11.7 - 15.7 g/dL    Hematocrit 41.5 35.0 - 47.0 %    MCV 96 78 - 100 fl    MCH 31.0 26.5 - 33.0 pg    MCHC 32.3 31.5 - 36.5 g/dL    RDW 12.5 10.0 - 15.0 %    Platelet Count 233 150 - 450 10e9/L    Diff Method Automated Method     % Neutrophils 47.4 %    % Lymphocytes 36.9 %    % Monocytes 11.4 %    % Eosinophils 3.9 %    % Basophils 0.2 %    % Immature Granulocytes 0.2 %    Nucleated RBCs 0 0 /100    Absolute Neutrophil 2.1 1.6 - 8.3 10e9/L    Absolute Lymphocytes 1.6 0.8 - 5.3 10e9/L    Absolute Monocytes 0.5 0.0 - 1.3 10e9/L    Absolute Eosinophils 0.2 0.0 - 0.7 10e9/L    Absolute Basophils 0.0 0.0 - 0.2 10e9/L    Abs Immature Granulocytes 0.0 0 - 0.4 10e9/L    Absolute Nucleated RBC 0.0    Comprehensive metabolic panel   Result Value Ref Range    Sodium 141 133 - 144 mmol/L    Potassium 3.6 3.4 - 5.3 mmol/L    Chloride 105 94 - 109 mmol/L    Carbon Dioxide 29 20 - 32 mmol/L    Anion Gap 7 3 - 14 mmol/L    Glucose 84 70 - 99 mg/dL    Urea Nitrogen 23 7 - 30 mg/dL    Creatinine 0.94 0.52 - 1.04 mg/dL    GFR Estimate 67 >60 mL/min/[1.73_m2]    GFR Estimate If Black 77 >60 mL/min/[1.73_m2]    Calcium 8.4 (L) 8.5 - 10.1 mg/dL    Bilirubin Total 0.3 0.2 - 1.3 mg/dL    Albumin 3.9 3.4 - 5.0 g/dL    Protein Total 6.8 6.8 - 8.8 g/dL    Alkaline Phosphatase 93 40 - 150 U/L    ALT 17 0 - 50 U/L    AST 20 0 - 45 U/L   Lipase   Result Value Ref Range    Lipase 134 73 - 393 U/L   Lactic acid whole blood   Result Value Ref Range    Lactic Acid 0.7 0.7 - 2.0 mmol/L   UA with Microscopic   Result Value Ref Range    Color Urine Straw     Appearance Urine Clear     Glucose Urine Negative NEG^Negative mg/dL    Bilirubin Urine Negative NEG^Negative    Ketones Urine 20 (A) NEG^Negative mg/dL    Specific Gravity Urine 1.013 1.003 - 1.035    Blood Urine Moderate (A) NEG^Negative    pH Urine 7.0 5.0 - 7.0 pH    Protein Albumin Urine Negative NEG^Negative mg/dL    Urobilinogen  mg/dL 0.0 0.0 - 2.0 mg/dL    Nitrite Urine Negative NEG^Negative    Leukocyte Esterase Urine Negative NEG^Negative    Source Midstream Urine     WBC Urine 1 0 - 5 /HPF    RBC Urine 13 (H) 0 - 2 /HPF    Squamous Epithelial /HPF Urine <1 0 - 1 /HPF    Mucous Urine Present (A) NEG^Negative /LPF   CT Abdomen Pelvis w Contrast    Narrative    CT ABDOMEN PELVIS WITH CONTRAST  1/21/2019 7:44 PM    TECHNIQUE: Images from diaphragm to pubic symphysis 61 mL Isovue-[id number].  Radiation dose for this scan was reduced using automated exposure  control, adjustment of the mA and/or kV according to patient size, or  iterative reconstruction technique.    HISTORY: Abdominal pain, diverticulitis suspected.    COMPARISON: None.    FINDINGS: Subcentimeter low-dense lesion in the right lobe of the  liver, favor a small cyst although this is incompletely characterized.  There is a low attenuation right adrenal nodule 1 cm craniocaudal  dimension x 0.5 cm transverse dimension. Normal-appearing gallbladder,  spleen, and left adrenal gland. No acute pancreas abnormality.  Borderline prominent pancreatic duct.    Mild pelvicaliectasis of the left renal collecting system. There is  symmetric enhancement of both kidneys without perinephric stranding,  slightly prominent right renal pelvis. No evidence for ureteral  calculi.    No periaortic or pelvic adenopathy. No free fluid. No acute appearing  bowel abnormality. No aggressive bone lesions.      Impression    IMPRESSION: No specific cause for abdominal pain identified. There is  mild prominence of renal collecting systems bilaterally but symmetric  enhancement of both kidneys without perinephric stranding. Could be  related to mild ureteropelvic junction obstructions.               APUL JEFFERSON MD       Medications - No data to display    Assessments & Plan (with Medical Decision Making)     MDM: Davida Mo is a 58 year old female who presented with low back pain bilaterally but worse on  the left side and radiating around the abdomen onset 4 days ago and persistent since that time.  Pain at times can be moderate to severe.  interfered with sleep.  Nausea without vomiting no urinary tract symptoms.  Afebrile without history of ureterolithiasis.   Her presenting vital signs were reassuring as was her examination although she was tender in the lower abdomen, but was Without peritoneal signs.    We considered the differential diagnosis including diverticulitis, appendicitis, pancreatitis, urinary tract infection or pyelonephritis,     Her urinalysis did show 15 white cells but no white blood cells nitrite or leukocyte esterase.  CT imaging demonstrates left greater than right sided hydronephrosis but no obvious stone seen.    Unclear cause of her symptoms but I am suspicious she may have passed a stone.   Other possibilities include back spasm in the left dorsi region but I could not significantly reproduce symptoms by palpation.          I have given recommendations as below with precautions for return.    I have reviewed the nursing notes.    I have reviewed the findings, diagnosis, plan and need for follow up with the patient.          Medication List      There are no discharge medications for this visit.         Final diagnoses:   Acute bilateral low back pain without sciatica   Abdominal pain, generalized - No serious findings on eval.  No signs of intraabdominal cause (no stones, no UTI/pyelo, no GB changes, no appe, no diverticulitis).  I suspect back spasm. maintain back range of motion.  take ibuprofen 600 mg every 6 hours and tylenol 1000 mg every 6 hours.  Consider lidocaine patch 4% OTC on for 12 of every 24 hours.  return for fever, worsening.  numbness inner thighs, foot drop, incontinence urine/stool.       1/21/2019   [facility] EMERGENCY DEPARTMENT     Jaime Begum MD  01/22/19 0043

## 2019-01-21 NOTE — TELEPHONE ENCOUNTER
Reason for call:  Patient reporting a symptom    Symptom or request: Davida is thinking she may have the flu x 4 days.  She states that she's got the worst stomach pain / ache, kidneys hurting the worst.  She gets the chills and curls up in a ball.  She states that it happens every night around 8 p.m. And then usually fine during day.  Please call and assess. Thank you..Elizabeth Duffy    Duration (how long have symptoms been present): 4 days    Have you been treated for this before? No    Phone Number patient can be reached at:  Home number on file [phone] (home)    Best Time:  Any time    Can we leave a detailed message on this number:  YES    Call taken on 1/21/2019 at 8:17 AM by Elizabeth Duffy

## 2019-01-21 NOTE — ED AVS SNAPSHOT
[facility] Emergency Department  [address] [facility] [address] [address]  Phone:  [phone]  Fax:  [phone]                                    Davida Mo   MRN: [id number]    Department:  [facility] Emergency Department   Date of Visit:  1/21/2019           After Visit Summary Signature Page    I have received my discharge instructions, and my questions have been answered. I have discussed any challenges I see with this plan with the nurse or doctor.    ..........................................................................................................................................  Patient/Patient Representative Signature      ..........................................................................................................................................  Patient Representative Print Name and Relationship to Patient    ..................................................               ................................................  Date                                   Time    ..........................................................................................................................................  Reviewed by Signature/Title    ...................................................              ..............................................  Date                                               Time          22EPIC Rev 08/18

## 2019-01-21 NOTE — TELEPHONE ENCOUNTER
"Davida Mo is a 58 year old female  who calls with lower back  Pain.   The pain began 4 days ago.  The pain is rated a 10 in the evenings only. Feels good during the day. The pain is described as aching, stabbing and cramping and is located bilateral lower back/, which is with radiation to abdomen. Left side is worse than right  Symptom associated with the back and abdominal pain nausea and chills.  Patient has not had previous abdominal surgery   Patient reports that she has decreased amount of urination. \"I feel like I need to go but not much comes out.\"  Appt made for later today as she is feeling fine now. Advised that if she has pain return today; to have someone take her to the ED rather than come to the clinic. She agreed with plan.   Pardeep Flores RN      "

## 2019-01-22 NOTE — DISCHARGE INSTRUCTIONS
ICD-10-CM    1. Acute bilateral low back pain without sciatica M54.5    2. Abdominal pain, generalized R10.84     No serious findings on eval.  No signs of intraabdominal cause (no stones, no UTI/pyelo, no GB changes, no appe, no diverticulitis).  I suspect back spasm. maintain back range of motion.  take ibuprofen 600 mg every 6 hours and tylenol 1000 mg every 6 hours.  Consider lidocaine patch 4% OTC on for 12 of every 24 hours.  return for fever, worsening.  numbness inner thighs, foot drop, incontinence urine/stool.

## 2019-01-25 ENCOUNTER — TELEPHONE (OUTPATIENT)
Dept: FAMILY MEDICINE | Facility: CLINIC | Age: 59
End: 2019-01-25

## 2019-01-25 NOTE — TELEPHONE ENCOUNTER
Has been over 1 year since she was seen, recommend she come in to the office for a physical for the okay, hard for me to clear her without seeing more recently.  Paulina Zamora PA-C

## 2019-01-25 NOTE — TELEPHONE ENCOUNTER
Pt called to see if we received fax from oral surgeon. She states she takes antibiotics before dental procedures. She also wants to know if she needs an appointment to be seen to get Paulina to ok her for surgery.    [facility] called dental office. They are not requesting a preop just want doctor to state if she thinks patient is safe for surgery because of her medical history. They can prescribe antibiotics if needed.

## 2019-01-28 ENCOUNTER — OFFICE VISIT (OUTPATIENT)
Dept: FAMILY MEDICINE | Facility: CLINIC | Age: 59
End: 2019-01-28
Payer: COMMERCIAL

## 2019-01-28 VITALS
HEIGHT: 62 IN | HEART RATE: 58 BPM | SYSTOLIC BLOOD PRESSURE: 134 MMHG | WEIGHT: 130.3 LBS | TEMPERATURE: 98.1 F | DIASTOLIC BLOOD PRESSURE: 89 MMHG | BODY MASS INDEX: 23.98 KG/M2

## 2019-01-28 DIAGNOSIS — Z01.818 PREOP GENERAL PHYSICAL EXAM: Primary | ICD-10-CM

## 2019-01-28 DIAGNOSIS — Z09 FOLLOW UP: ICD-10-CM

## 2019-01-28 DIAGNOSIS — Z13.6 CARDIOVASCULAR SCREENING; LDL GOAL LESS THAN 130: ICD-10-CM

## 2019-01-28 LAB
ALBUMIN UR-MCNC: NEGATIVE MG/DL
APPEARANCE UR: CLEAR
BILIRUB UR QL STRIP: NEGATIVE
CHOLEST SERPL-MCNC: 164 MG/DL
COLOR UR AUTO: YELLOW
GLUCOSE UR STRIP-MCNC: NEGATIVE MG/DL
HDLC SERPL-MCNC: 54 MG/DL
HGB UR QL STRIP: ABNORMAL
KETONES UR STRIP-MCNC: NEGATIVE MG/DL
LDLC SERPL CALC-MCNC: 80 MG/DL
LEUKOCYTE ESTERASE UR QL STRIP: NEGATIVE
NITRATE UR QL: NEGATIVE
NONHDLC SERPL-MCNC: 110 MG/DL
PH UR STRIP: 7 PH (ref 5–7)
RBC #/AREA URNS AUTO: NORMAL /HPF
SOURCE: ABNORMAL
SP GR UR STRIP: 1.01 (ref 1–1.03)
TRIGL SERPL-MCNC: 149 MG/DL
UROBILINOGEN UR STRIP-ACNC: 0.2 EU/DL (ref 0.2–1)
WBC #/AREA URNS AUTO: NORMAL /HPF

## 2019-01-28 PROCEDURE — 36415 COLL VENOUS BLD VENIPUNCTURE: CPT | Performed by: FAMILY MEDICINE

## 2019-01-28 PROCEDURE — 80061 LIPID PANEL: CPT | Performed by: FAMILY MEDICINE

## 2019-01-28 PROCEDURE — 99213 OFFICE O/P EST LOW 20 MIN: CPT | Performed by: FAMILY MEDICINE

## 2019-01-28 PROCEDURE — 81001 URINALYSIS AUTO W/SCOPE: CPT | Performed by: FAMILY MEDICINE

## 2019-01-28 ASSESSMENT — MIFFLIN-ST. JEOR: SCORE: 1111.35

## 2019-01-28 ASSESSMENT — PAIN SCALES - GENERAL: PAINLEVEL: NO PAIN (0)

## 2019-01-28 NOTE — PROGRESS NOTES
[facility]  [address] [facility] [address]  [phone]  Dept: [phone]    PRE-OP EVALUATION:  Today's date: 2019    Davida Mo (: 1960) presents for pre-operative evaluation assessment as requested by Dr. George.  She requires evaluation and anesthesia risk assessment prior to undergoing surgery/procedure for treatment of teeth.    Proposed Surgery/ Procedure: tooth extraction/bone graft  Date of Surgery/ Procedure: 2019  Time of Surgery/ Procedure: 1:00 pm  Hospital/Surgical Facility: [facility]  Fax number for surgical facility: [phone]  Primary Physician: Paulina Zamora  Type of Anesthesia Anticipated: to be determined    Patient has a Health Care Directive or Living Will:  NO    1. YES - Do you have a history of heart attack, stroke, stent, bypass or surgery on an artery in the head, neck, heart or legs?  2. NO - Do you ever have any pain or discomfort in your chest?  3. NO - Do you have a history of  Heart Failure?  4. NO - Are you troubled by shortness of breath when: walking on the level, up a slight hill or at night?  5. NO - Do you currently have a cold, bronchitis or other respiratory infection?  6. NO - Do you have a cough, shortness of breath or wheezing?  7. NO - Do you sometimes get pains in the calves of your legs when you walk?  8. NO - Do you or anyone in your family have previous history of blood clots?  9. NO - Do you or does anyone in your family have a serious bleeding problem such as prolonged bleeding following surgeries or cuts?  10. NO - Have you ever had problems with anemia or been told to take iron pills?  11. NO - Have you had any abnormal blood loss such as black, tarry or bloody stools, or abnormal vaginal bleeding?  12. NO - Have you ever had a blood transfusion?  13. NO - Have you or any of your relatives ever had problems with anesthesia?  14. NO - Do you have sleep apnea, excessive snoring or daytime  drowsiness?  15. NO - Do you have any prosthetic heart valves?  16. NO - Do you have prosthetic joints?  17. NO - Is there any chance that you may be pregnant?      HPI:     HPI related to upcoming procedure: having tooth extraction has h/u coronary =dissection o stabel on diltiazem and metoprolol.  Has recent f/u with her cardioogists [facility]      See problem list for active medical problems.  Problems all longstanding and stable, except as noted/documented.  See ROS for pertinent symptoms related to these conditions.                                                                                                                                                          .    MEDICAL HISTORY:     Patient Active Problem List    Diagnosis Date Noted     Prinzmetal angina (H) 06/30/2016     Priority: Medium     Thyroid nodule 08/24/2015     Priority: Medium     Essential hypertension 09/24/2014     Priority: Medium     Chest pain 09/23/2014     Priority: Medium     Atrophic vaginitis 03/18/2014     Priority: Medium     Anxiety 12/11/2012     Priority: Medium     Lentigo 12/11/2012     Priority: Medium     Seborrheic keratosis 12/11/2012     Priority: Medium      Past Medical History:   Diagnosis Date     Arthritis      Depression     on meds from 96 to 06. no symptoms have returned     Hypertension 11/09     NSTEMI (non-ST elevated myocardial infarction) (H) 2011,9/23/2014    see care everywhere     Prinzmetal angina (H)     first heart attack. prinzmetal angina diagnosis     Past Surgical History:   Procedure Laterality Date     ABDOMEN SURGERY  1987    tubal ligation     TUBAL LIGATION       Current Outpatient Medications   Medication Sig Dispense Refill     aspirin 81 MG EC tablet Take 81 mg by mouth every evening  90 tablet 3     diltiazem 120 MG 24 hr CD capsule Take 1 capsule (120 mg) by mouth daily 30 capsule 0     estradiol POWD bio identical compounded cream 0.75MG/GM apply 0.5MG per vagina every  other day 45 g 1     L-GLUTAMINE PO Take 1,000 mg by mouth every evening       Levomefolate Glucosamine (METHYLFOLATE) 400 MCG CAPS Take 1 capsule by mouth daily       metoprolol (TOPROL-XL) 50 MG 24 hr tablet Take 50 mg by mouth daily        NONFORMULARY Take 1,200 mg by mouth daily [place] Cinnamon       NONFORMULARY Take 2 tablets by mouth every evening Bone Strength - Calcium       NP THYROID 30 MG tablet Take 1 tablet by mouth daily       OVER-THE-COUNTER Apply 0.25 teaspoonful topically daily Progest Cream - Hormone balancing       OVER-THE-COUNTER Apply topically daily DHEA Cream       OVER-THE-COUNTER Take 1 tablet by mouth daily Lung Bronchial Sinus, Dr. Jesus by natural Factors        Probiotic Product (PROBIOTIC DAILY PO) Take 1 capsule by mouth daily BIO Gerard - 85 billion live cultures,33 probiotic strains per capsule       Ubiquinol 100 MG CAPS Take 1 capsule by mouth daily       vitamin A 45490 units TABS Take 1 capsule by mouth daily       Vitamin D-Vitamin K (VITAMIN K2-VITAMIN D3 PO) 3 drops daily D3 = 1208, K = 25 mcg       cyclobenzaprine (FLEXERIL) 10 MG tablet Take 1 tablet (10 mg) by mouth nightly as needed for muscle spasms (Patient not taking: Reported on 1/28/2019) 10 tablet 0     morphine (MS CONTIN) 15 MG CR tablet Take 15 mg by mouth every 12 hours as needed for severe pain       nitroglycerin (NITROSTAT) 0.4 MG SL tablet Place 1 tablet (0.4 mg) under the tongue every 5 minutes as needed for chest pain (Patient not taking: Reported on 1/28/2019) 25 tablet 1     Omega-3 Fatty Acids (OMEGA-3 FISH OIL PO) Take 1 Tablespoonful by mouth daily Daily in winter, 1500 mg EPA/DHA       oxyCODONE HCl (OXAYDO) 5 MG TABA Take 5-10 mg by mouth every 3 hours as needed       STATIN NOT PRESCRIBED, INTENTIONAL, 1 each At Bedtime Statin not prescribed intentionally due to Allergy to statin - okay to stop by cardiology 0 each 0     OTC products: None, except as noted above    Allergies   Allergen  "Reactions     Kiwi Anaphylaxis     Egg White (Diagnostic) Other (See Comments)     tested     Lactose Other (See Comments)     Contraindicated due to Thyroid, Hashimoto     Milk Protein Extract Other (See Comments)     Contraindicated due to Thyroid, Hashimoto     Remeron [Mirtazapine] Rash     Patient states that medication made her extremly tired, felt like she could not function until late afternoon.  Also noted having rash all over body      Latex Allergy: NO    Social History     Tobacco Use     Smoking status: Former Smoker     Years: 10.00     Types: Cigarettes     Start date: 9/10/1972     Last attempt to quit: 1991     Years since quittin.3     Smokeless tobacco: Never Used   Substance Use Topics     Alcohol use: Yes     Comment: Occasionally     History   Drug Use No       REVIEW OF SYSTEMS:   CONSTITUTIONAL: NEGATIVE for fever, chills, change in weight  ENT/MOUTH: NEGATIVE for ear, mouth and throat problems  RESP: NEGATIVE for significant cough or SOB  CV: NEGATIVE for chest pain, palpitations or peripheral edema    EXAM:   /89 (BP Location: Right arm, Patient Position: Sitting, Cuff Size: Adult Regular)   Pulse 58   Temp 98.1  F (36.7  C) (Tympanic)   Ht 1.562 m (5' 1.5\")   Wt 59.1 kg (130 lb 4.8 oz)   BMI 24.22 kg/m    GENERAL APPEARANCE: healthy, alert and no distress  HENT: ear canals and TM's normal and nose and mouth without ulcers or lesions  RESP: lungs clear to auscultation - no rales, rhonchi or wheezes  CV: regular rate and rhythm, normal S1 S2, no S3 or S4 and no murmur, click or rub   ABDOMEN: soft, nontender, no HSM or masses and bowel sounds normal  NEURO: Normal strength and tone, sensory exam grossly normal, mentation intact and speech normal    DIAGNOSTICS:   No labs or EKG required for low risk surgery (cataract, skin procedure, breast biopsy, etc)    Recent Labs   Lab Test 19  1853 17  1406  14  1540   HGB 13.4 13.8   < > 13.5     --   --  " 237    140   < > 142   POTASSIUM 3.6 3.8   < > 3.7   CR 0.94 0.85   < > 0.89    < > = values in this interval not displayed.     IMPRESSION:     Reason for surgery/procedure:  Tooth extrction    Diagnosis/reason for consult: pre-op clearance      (Z01.818) Preop general physical exam  (primary encounter diagnosis)    (Z09) Follow up    Plan: *UA reflex to Microscopic and Culture ([facility] (except [place] and         [place])            (Z13.6) CARDIOVASCULAR SCREENING; LDL GOAL LESS THAN 13  Plan: Lipid panel reflex to direct LDL Fasting            The proposed surgical procedure is considered LOW risk.    REVISED CARDIAC RISK INDEX  The patient has the following serious cardiovascular risks for perioperative complications such as (MI, PE, VFib and 3  AV Block):  No serious cardiac risks  INTERPRETATION: 1 risks: Class II (low risk - 0.9% complication rate)    The patient has the following additional risks for perioperative complications:  No identified additional risks      ICD-10-CM    1. Preop general physical exam Z01.818    2. Follow up Z09 *UA reflex to Microscopic and Culture ([facility] (except [place] and [place])   3. CARDIOVASCULAR SCREENING; LDL GOAL LESS THAN 130 Z13.6 Lipid panel reflex to direct LDL Fasting       RECOMMENDATIONS:       Cardiovascular Risk  Performs 4 METs exercise without symptoms (Light housework (dusting, washing dishes), Climb a flight of stairs and Walk on level ground at 15 minutes per mile (4 miles/hour)) .       --Patient is to take all scheduled medications on the day of surgery EXCEPT for modifications listed below.    APPROVAL GIVEN to proceed with proposed procedure, without further diagnostic evaluation       Signed Electronically by: Danny Patiño MD    Copy of this evaluation report is provided to requesting physician.    [facility] Preop Guidelines    Revised Cardiac Risk Index

## 2019-02-15 ENCOUNTER — TELEPHONE (OUTPATIENT)
Dept: FAMILY MEDICINE | Facility: CLINIC | Age: 59
End: 2019-02-15

## 2019-02-15 DIAGNOSIS — E04.1 THYROID NODULE: Primary | ICD-10-CM

## 2019-02-15 NOTE — TELEPHONE ENCOUNTER
Call placed to patient.  Relayed FER Zamora message and contacts given for referrals and scheduling US.  Lab appointment scheduled for next week.  Patient verbalizes understanding, agrees with plan and follow up.  Jessenia Torrez RN

## 2019-02-15 NOTE — TELEPHONE ENCOUNTER
Call placed to patient.  Reports neck pain is intermittent, chronic due to Thyroid Nodules.  1wk of increased duration of pain with increased fatigue.  Last Thyroid labs drawn 6/2018.  Patient also requesting US of Thyroid, last US 4/2018.  Please advise.  Jessenia Torrez RN

## 2019-02-15 NOTE — TELEPHONE ENCOUNTER
Reason for call:  Patient reporting a symptom    Symptom or request: Davida is calling and would like to come in to have her thyroid labs done and possibly her US thyroid done.  US usually done in April each year.  She has been having pain in her neck and wondering if her thyroid is acting up.  She was seen on 1/19 by Dr. Patiño for pre op but before that it was 8/9/17.      Duration (how long have symptoms been present): unknown    Have you been treated for this before? Yes    Phone Number patient can be reached at:  Home number on file [phone] (home)    Best Time:  Any time    Can we leave a detailed message on this number:  YES    Call taken on 2/15/2019 at 9:38 AM by Elizabeth Duffy

## 2019-02-15 NOTE — TELEPHONE ENCOUNTER
TSH, ultrasound orders placed.  If she is having pain with the nodules she should see endocrinology again.    Your provider has referred you to: [facility] [place]:  [facility] [phone]  [email or web address] Endocrinology and Diabetes Clinic [facility] [phone]   [email or web address]  P: [facility] [place] [phone]   [email or web address]    If pain continues to worsen or changes, would recommend being seen in clinic as it can take a while to see endocrine.  Paulina Zamora PA-C

## 2019-02-18 DIAGNOSIS — E04.1 THYROID NODULE: ICD-10-CM

## 2019-02-18 LAB
T3FREE SERPL-MCNC: 3.2 PG/ML (ref 2.3–4.2)
T4 FREE SERPL-MCNC: 0.91 NG/DL (ref 0.76–1.46)
TSH SERPL DL<=0.005 MIU/L-ACNC: 0.78 MU/L (ref 0.4–4)

## 2019-02-18 PROCEDURE — 84481 FREE ASSAY (FT-3): CPT | Performed by: PHYSICIAN ASSISTANT

## 2019-02-18 PROCEDURE — 36415 COLL VENOUS BLD VENIPUNCTURE: CPT | Performed by: PHYSICIAN ASSISTANT

## 2019-02-18 PROCEDURE — 84439 ASSAY OF FREE THYROXINE: CPT | Performed by: PHYSICIAN ASSISTANT

## 2019-02-18 PROCEDURE — 84443 ASSAY THYROID STIM HORMONE: CPT | Performed by: PHYSICIAN ASSISTANT

## 2019-02-26 ENCOUNTER — HOSPITAL ENCOUNTER (OUTPATIENT)
Dept: ULTRASOUND IMAGING | Facility: CLINIC | Age: 59
Discharge: HOME | End: 2019-02-26
Attending: PHYSICIAN ASSISTANT | Admitting: PHYSICIAN ASSISTANT
Payer: COMMERCIAL

## 2019-02-26 DIAGNOSIS — E04.1 THYROID NODULE: ICD-10-CM

## 2019-02-26 PROCEDURE — 76536 US EXAM OF HEAD AND NECK: CPT

## 2019-02-27 ENCOUNTER — TELEPHONE (OUTPATIENT)
Dept: ENDOCRINOLOGY | Facility: CLINIC | Age: 59
End: 2019-02-27

## 2019-02-27 NOTE — TELEPHONE ENCOUNTER
[facility] Call Center    Phone Message    May a detailed message be left on voicemail: yes    Reason for Call: Question regarding specialist protocol  Please follow protocols- only utilize this documentation for questions or concerns that are not clear in the protocol.  Contact clinic directly to clarify question(s) via phone or [facility] message.   Was Clinic Available: yes  Question regarding protocol:  Patient is stating she was notified to have another fine needle biopsy done. Writer was advised by care team to send a message. Please call to discuss with patient.  Is there a referral for the requested specialist/specialty? No        Action Taken: Message routed to:  Adult Clinics: Endocrinology p [phone]

## 2019-02-27 NOTE — TELEPHONE ENCOUNTER
"Last office visit with Dr. Gutiérrez on 12/30/16.     Per 1/13/17 telephone note:  \"She has nondiagnostic thyroid nodule biopsy.  We discussed about options of repeating it which is standard of care but she would like to obtain a sort of ultrasound for now.  Repeat biopsy with on-site pathologist was offered.\"     Thyroid ultrasound was ordered for her     Natalie Gutiérrez MD  [id number]  Endocrinology Service    Per last result note from 4/24/17 thyroid ultrasound:  \"Notes Recorded by Natalie Gutiérrez MD on 4/25/2017 at 10:52 AM  Thyroid nodules are stable at this time and would recommend following this annually.     Natalie Gutiérrez MD  [id number]  Endocrinology Service\"      Patient is scheduled for 5/1/19 consult with Dr. Wilson.       Contacted patient to review. Patient reports that she has been having pain on the right side of her neck off and on as long as she has had her thyroid issues. Patient states that  It has been more noticeable now and she is concerned. Patient recently had labs and thyroid ultrasound ordered by her PCP. Please see [facility] for results for details.    Will send to Dr. Gutiérrez to review thyroid ultrasound and labs to determine if patient should be seen in endocrinology.       Delia Motta RN  Endocrine Care Coordinator  [facility]      "

## 2019-02-28 ENCOUNTER — TELEPHONE (OUTPATIENT)
Dept: FAMILY MEDICINE | Facility: CLINIC | Age: 59
End: 2019-02-28

## 2019-02-28 DIAGNOSIS — E04.1 THYROID NODULE: Primary | ICD-10-CM

## 2019-02-28 NOTE — TELEPHONE ENCOUNTER
Reason for Call: Request for an order or referral:    Order or referral being requested: Davida LEFT MESSAGE:  She states that her doctor wanted her to recheck her thyroid labs in 3 weeks.  T4, T3 and TSH.  No orders in chart.  Not sure if it's Kirstin she's referring to or someone else.  Just had labs done on 2/18/19.  Please call and assess. Thank you..Elizabeth Duffy    Date needed: within the next several days    Has the patient been seen by the PCP for this problem? YES    Phone number Patient can be reached at:  Home number on file [phone] (home)      Call taken on 2/28/2019 at 1:04 PM by Elizabeth Duffy

## 2019-02-28 NOTE — TELEPHONE ENCOUNTER
Voicemail message left for patient to return call to receive FER Zamora message to address her questions.  Jessenia Torrez RN

## 2019-02-28 NOTE — TELEPHONE ENCOUNTER
With the stable nodules on the thyroid and normal thyroid function testing, it is unlikely that the neck pain could be due to enlarging nodules.     Natalie Gutiérrez MD  [id number]  Endocrinology Service

## 2019-02-28 NOTE — TELEPHONE ENCOUNTER
US Thyroid completed 2/26/19, impression below.    IMPRESSION: Stable multinodular thyroid gland from 4/25/2018.    Does patient need labs checked in 3 weeks? Please advise.  Jessenia Torrez RN

## 2019-02-28 NOTE — TELEPHONE ENCOUNTER
Patient notified of recommendations. Advised patient that if symptoms worsen or persist to have primary care evaluate.     Patient agreeable with plan.    Mary Hanna LPN  Diabetes Clinic Coordinator   Adult Endocrinology and Pediatric Specialty Clinics  [facility]

## 2019-02-28 NOTE — TELEPHONE ENCOUNTER
Thyroid labs were normal on 2/18/19.  Did her naturopath doctor change her medications? She is on the low end of normal for TSH so I would worry that she is over-replacing the thyroid if she increased any doses. Can this doctor check the thyroid labs?  If no changes were made, I do not think we have to recheck labs. Labs and ultrasound should be done yearly.  Please have her natural doctor send us records, as I have no record of what evaluation or treatment they are doing.  Paulina Zamora PA-C

## 2019-02-28 NOTE — TELEPHONE ENCOUNTER
Patient returned call.  Patient was not seen today at [facility] provider as she was out of clinic, patient did not want to see covering provider.  Patient reports dose increase today 1.5 grains daily. Was on 1 grain daily.    Patient made the dose increase as she states it is within her ability to adjust due to symptoms.  Patient states her primary pharmacy was out of the brand she needed and had alternate pharmacy fill as she does not tolerate formulary substitution.    Requested copy of all records with [facility] be faxed to FER Zamora.  Discussed benefit of provider making dose adjustment, place orders for labs needed.  Patient unsure if [facility] is able to order labs as they have always told her to return to PCP for labs.  Patient agrees with above. Verbalizes understanding of concern for over-replacing thyroid medication, states she knows what she needs, wants to feel better , like she did before.  Patient agrees to contact [facility] for records, fax number given.  Please advise.  Jessenia Torrez RN

## 2019-02-28 NOTE — TELEPHONE ENCOUNTER
I do not recommend she make dose changes. Her thyroid is too close to being hyperthyroid which can affect her heart.   I put in future orders for labs because I am worried she may get to an unsafe range. However it often takes a while for labs to change, I recommend rechecking 4-6 weeks after dose change.  She needs to follow up with her naturopath doctor to discuss this. They should look into checking labs if she is changing medications through them. I don't prescribe her thyroid medication or really know anything about it.  Paulina Zamora PA-C

## 2019-03-01 NOTE — TELEPHONE ENCOUNTER
Call placed to patient.  Relayed R Noah message.  Strongly encouraged patient to contact Naturopath to schedule labs through them as they are following and changing doses.  Patient agrees with plan, she is aware of need to check labs in 4-6 weeks. If unable to get them checked with naturopath then she will schedule lab visit at [place].  Jessenia Torrez RN

## 2019-04-29 ENCOUNTER — COMMUNICATION - HEALTHEAST (OUTPATIENT)
Dept: CARDIOLOGY | Facility: CLINIC | Age: 59
End: 2019-04-29

## 2019-04-29 DIAGNOSIS — I10 ESSENTIAL HYPERTENSION: ICD-10-CM

## 2019-05-07 ENCOUNTER — TELEPHONE (OUTPATIENT)
Dept: FAMILY MEDICINE | Facility: CLINIC | Age: 59
End: 2019-05-07

## 2019-05-07 NOTE — TELEPHONE ENCOUNTER
Panel Management Review      Patient has the following on her problem list:     Hypertension   Last three blood pressure readings:  BP Readings from Last 3 Encounters:   01/28/19 134/89   01/21/19 (!) 143/100   08/09/17 123/76     Blood pressure: FAILED    HTN Guidelines:  Less than 140/90      Composite cancer screening  Chart review shows that this patient is due/due soon for the following Pap Smear and Mammogram  Summary:    Patient is due/failing the following:   MAMMOGRAM, PAP and PHYSICAL    Action needed:   Patient needs office visit for physical and pap. and Patient needs referral/order: mammogram    Type of outreach:    Phone, spoke to patient.  Did not want to set appointment up with me over the phone. Said she would call back.    Questions for provider review:    None                                                                                                                                    Misty Tinoco CMA     Chart routed to none.

## 2019-05-30 ENCOUNTER — TELEPHONE (OUTPATIENT)
Dept: FAMILY MEDICINE | Facility: CLINIC | Age: 59
End: 2019-05-30

## 2019-05-30 DIAGNOSIS — Z12.11 SPECIAL SCREENING FOR MALIGNANT NEOPLASMS, COLON: Primary | ICD-10-CM

## 2019-06-03 ENCOUNTER — OFFICE VISIT (OUTPATIENT)
Dept: FAMILY MEDICINE | Facility: CLINIC | Age: 59
End: 2019-06-03
Payer: COMMERCIAL

## 2019-06-03 VITALS
TEMPERATURE: 98.9 F | OXYGEN SATURATION: 100 % | DIASTOLIC BLOOD PRESSURE: 74 MMHG | BODY MASS INDEX: 23.64 KG/M2 | HEART RATE: 66 BPM | RESPIRATION RATE: 12 BRPM | WEIGHT: 125.2 LBS | SYSTOLIC BLOOD PRESSURE: 130 MMHG | HEIGHT: 61 IN

## 2019-06-03 DIAGNOSIS — Z11.4 SCREENING FOR HIV (HUMAN IMMUNODEFICIENCY VIRUS): ICD-10-CM

## 2019-06-03 DIAGNOSIS — Z12.31 VISIT FOR SCREENING MAMMOGRAM: ICD-10-CM

## 2019-06-03 DIAGNOSIS — D48.5 NEOPLASM OF UNCERTAIN BEHAVIOR OF SKIN: ICD-10-CM

## 2019-06-03 DIAGNOSIS — Z11.59 NEED FOR HEPATITIS C SCREENING TEST: ICD-10-CM

## 2019-06-03 DIAGNOSIS — Z00.01 ENCOUNTER FOR ROUTINE ADULT MEDICAL EXAM WITH ABNORMAL FINDINGS: Primary | ICD-10-CM

## 2019-06-03 PROBLEM — I25.42 DISSECTION OF CORONARY ARTERY: Status: ACTIVE | Noted: 2018-05-10

## 2019-06-03 PROCEDURE — 99396 PREV VISIT EST AGE 40-64: CPT | Performed by: PHYSICIAN ASSISTANT

## 2019-06-03 RX ORDER — LEVOTHYROXINE SODIUM 25 MCG
TABLET ORAL
COMMUNITY
Start: 2019-04-29 | End: 2019-07-03

## 2019-06-03 RX ORDER — THYROID,PORK 97.5 MG
30 TABLET ORAL DAILY
COMMUNITY
Start: 2019-05-01 | End: 2021-10-11

## 2019-06-03 ASSESSMENT — MIFFLIN-ST. JEOR: SCORE: 1087.53

## 2019-06-03 NOTE — PATIENT INSTRUCTIONS
Call insurance about shingles shot - if covered in pharmacy or clinic    Bring back FIT test    Mammogram: For [facility] imaging departments: call [phone] to schedule     Bring back advanced care directive    Follow up with cardiology in the next 6 months    See dermatology for skin check    Labs in about 1 month    Preventive Health Recommendations  Female Ages 50 - 64    Yearly exam: See your health care provider every year in order to  o Review health changes.   o Discuss preventive care.    o Review your medicines if your doctor has prescribed any.      Get a Pap test every three years (unless you have an abnormal result and your provider advises testing more often).    If you get Pap tests with HPV test, you only need to test every 5 years, unless you have an abnormal result.     You do not need a Pap test if your uterus was removed (hysterectomy) and you have not had cancer.    You should be tested each year for STDs (sexually transmitted diseases) if you're at risk.     Have a mammogram every 1 to 2 years.    Have a colonoscopy at age 50, or have a yearly FIT test (stool test). These exams screen for colon cancer.      Have a cholesterol test every 5 years, or more often if advised.    Have a diabetes test (fasting glucose) every three years. If you are at risk for diabetes, you should have this test more often.     If you are at risk for osteoporosis (brittle bone disease), think about having a bone density scan (DEXA).    Shots: Get a flu shot each year. Get a tetanus shot every 10 years.    Nutrition:     Eat at least 5 servings of fruits and vegetables each day.    Eat whole-grain bread, whole-wheat pasta and brown rice instead of white grains and rice.    Get adequate Calcium and Vitamin D.     Lifestyle    Exercise at least 150 minutes a week (30 minutes a day, 5 days a week). This will help you control your weight and prevent disease.    Limit alcohol to one drink per day.    No smoking.      Wear sunscreen to prevent skin cancer.     See your dentist every six months for an exam and cleaning.    See your eye doctor every 1 to 2 years.

## 2019-06-03 NOTE — PROGRESS NOTES
SUBJECTIVE:   CC: Davida Mo is an 58 year old woman who presents for preventive health visit.     Healthy Habits:    Do you get at least three servings of calcium containing foods daily (dairy, green leafy vegetables, etc.)? yes    Amount of exercise or daily activities, outside of work: Takes care of granddaughter during the week and is very active    Problems taking medications regularly No    Medication side effects: Yes, synthroid GI disturbance    Have you had an eye exam in the past two years? no    Do you see a dentist twice per year? yes    Do you have sleep apnea, excessive snoring or daytime drowsiness?no      *  She would like to see if she can get a compound version of synthroid without None medical ingredients. Allergies to Lactose and magnesium.    * not fasting.  Last cardiology visit 5/10/18 -   Patient with unusual circumstances and suspicion of coronary artery dissection, nearly 4 years ago. She has been stable this past year without symptoms. She does not exercise on a regular basis and I have encouraged her to walk for at least 30 minutes 5 times each week. She will take that under advisement. I am not changing of her medications today. I have asked her to see us back in 1-1/2 years, but of course would be happy to see her sooner if questions or problems arise.    She has been on the compounded synthroid 5 days - has helped  Leila Martines - [facility]  She recommended testing again in another month      Today's PHQ-2 Score:   PHQ-2 ( 1999 [employer]) 6/3/2019 6/27/2016   Q1: Little interest or pleasure in doing things 0 -   Q2: Feeling down, depressed or hopeless 0 -   PHQ-2 Score 0 -   Q1: Little interest or pleasure in doing things - Not at all   Q2: Feeling down, depressed or hopeless - Not at all   PHQ-2 Score - 0       Abuse: Current or Past(Physical, Sexual or Emotional)- No  Do you feel safe in your environment? Yes    Social History     Tobacco Use     Smoking  status: Former Smoker     Years: 10.00     Types: Cigarettes     Start date: 9/10/1972     Last attempt to quit: 1991     Years since quittin.7     Smokeless tobacco: Never Used   Substance Use Topics     Alcohol use: Yes     Comment: Occasionally     If you drink alcohol do you typically have >3 drinks per day or >7 drinks per week? No                     Reviewed orders with patient.  Reviewed health maintenance and updated orders accordingly - Yes  Labs reviewed in EPIC  BP Readings from Last 3 Encounters:   19 130/74   19 134/89   19 (!) 143/100    Wt Readings from Last 3 Encounters:   19 56.8 kg (125 lb 3.2 oz)   19 59.1 kg (130 lb 4.8 oz)   19 56.7 kg (125 lb)                  Patient Active Problem List   Diagnosis     Anxiety     Lentigo     Seborrheic keratosis     Atrophic vaginitis     Chest pain     Essential hypertension     Thyroid nodule     Prinzmetal angina (H)     Dissection of coronary artery     Past Surgical History:   Procedure Laterality Date     TUBAL LIGATION         Social History     Tobacco Use     Smoking status: Former Smoker     Years: 10.00     Types: Cigarettes     Start date: 9/10/1972     Last attempt to quit: 1991     Years since quittin.7     Smokeless tobacco: Never Used   Substance Use Topics     Alcohol use: Yes     Comment: Occasionally     Family History   Problem Relation Age of Onset     Thyroid Disease Sister      Lung Cancer Sister 67        Lung Cancer, removal of left lower lung     Diabetes Mother      Hypertension Mother      Allergies Mother      Arthritis Mother      Eye Disorder Mother      Gastrointestinal Disease Mother         diverticulitis     Osteoporosis Mother         and spinal stenosis     Asthma Mother      Atrial fibrillation Mother      Heart Disease Father      Alcohol/Drug Father      Cancer Sister      Neurologic Disorder Sister         brain tumor / cancer     Breast Cancer No family hx of             Mammogram Screening: Patient over age 50, mutual decision to screen reflected in health maintenance.    Pertinent mammograms are reviewed under the imaging tab.  History of abnormal Pap smear:   NO - age 30-65 PAP every 5 years with negative HPV co-testing recommended  Last 3 Pap and HPV Results:   PAP / HPV Latest Ref Rng & Units 6/30/2016   PAP - NIL   HPV 16 DNA NEG Negative   HPV 18 DNA NEG Negative   OTHER HR HPV NEG Negative     PAP / HPV Latest Ref Rng & Units 6/30/2016   PAP - NIL   HPV 16 DNA NEG Negative   HPV 18 DNA NEG Negative   OTHER HR HPV NEG Negative     Reviewed and updated as needed this visit by clinical staff  Tobacco  Allergies  Meds  Med Hx  Surg Hx  Soc Hx        Reviewed and updated as needed this visit by Provider  Tobacco  Med Hx  Surg Hx  Soc Hx       Past Medical History:   Diagnosis Date     Arthritis      Depression     on meds from 96 to 06. no symptoms have returned     NSTEMI (non-ST elevated myocardial infarction) (H) 2011,9/23/2014    see care everywhere     Prinzmetal angina (H)     first heart attack. prinzmetal angina diagnosis      Past Surgical History:   Procedure Laterality Date     TUBAL LIGATION         ROS:  CONSTITUTIONAL: NEGATIVE for fever, chills, change in weight  INTEGUMENTARY/SKIN: NEGATIVE for worrisome rashes, moles or lesions  EYES: NEGATIVE for vision changes or irritation  ENT: NEGATIVE for ear, mouth and throat problems  RESP: NEGATIVE for significant cough or SOB  BREAST: NEGATIVE for masses, tenderness or discharge  CV: NEGATIVE for chest pain, palpitations or peripheral edema  GI: NEGATIVE for nausea, abdominal pain, heartburn, or change in bowel habits  : NEGATIVE for unusual urinary or vaginal symptoms. No vaginal bleeding.  MUSCULOSKELETAL: NEGATIVE for significant arthralgias or myalgia  NEURO: NEGATIVE for weakness, dizziness or paresthesias  PSYCHIATRIC: NEGATIVE for changes in mood or affect     OBJECTIVE:   /74   Pulse  "66   Temp 98.9  F (37.2  C) (Tympanic)   Resp 12   Ht 1.561 m (5' 1.46\")   Wt 56.8 kg (125 lb 3.2 oz)   SpO2 100%   BMI 23.31 kg/m    EXAM:  GENERAL: healthy, alert and no distress  EYES: Eyes grossly normal to inspection, PERRL and conjunctivae and sclerae normal  HENT: ear canals and TM's normal, nose and mouth without ulcers or lesions  NECK: no adenopathy, no asymmetry, masses, or scars and thyroid normal to palpation  RESP: lungs clear to auscultation - no rales, rhonchi or wheezes  BREAST: normal without masses, tenderness or nipple discharge and no palpable axillary masses or adenopathy  CV: regular rate and rhythm, normal S1 S2, no S3 or S4, no murmur, click or rub, no peripheral edema and peripheral pulses strong  ABDOMEN: soft, nontender, no hepatosplenomegaly, no masses and bowel sounds normal   (female): deferred by patient   MS: no gross musculoskeletal defects noted, no edema  SKIN: POSITIVE multiple SKs. Right hand shows SK with darker brown spots, she is concerned. And right lower neck shows fleshy brown raised mole with darker black/brown spots in it  NEURO: Normal strength and tone, mentation intact and speech normal  PSYCH: mentation appears normal, affect normal/bright  LYMPH: no cervical, supraclavicular, axillary, or inguinal adenopathy    ASSESSMENT/PLAN:     ASSESSMENT/PLAN:      ICD-10-CM    1. Encounter for routine adult medical exam with abnormal findings Z00.01    2. Visit for screening mammogram Z12.31 *MA Screening Digital Bilateral   3. Neoplasm of uncertain behavior of skin D48.5 DERMATOLOGY REFERRAL   4. Screening for HIV (human immunodeficiency virus) Z11.4 **HIV Antigen Antibody Combo FUTURE anytime   5. Need for hepatitis C screening test Z11.59 **Hepatitis C Screen Reflex to RNA FUTURE anytime     She is willing to get the mammogram this year    Patient Instructions   Call insurance about shingles shot - if covered in pharmacy or clinic    Bring back FIT " "test    Mammogram: [facility] the [facility] imaging departments: call [phone] to schedule     Bring back advanced care directive    Follow up with cardiology in the next 6 months    See dermatology for skin check    Labs in about 1 month    COUNSELING:   Reviewed preventive health counseling, as reflected in patient instructions    Estimated body mass index is 23.31 kg/m  as calculated from the following:    Height as of this encounter: 1.561 m (5' 1.46\").    Weight as of this encounter: 56.8 kg (125 lb 3.2 oz).     reports that she quit smoking about 27 years ago. Her smoking use included cigarettes. She started smoking about 46 years ago. She quit after 10.00 years of use. She has never used smokeless tobacco.      Counseling Resources:  ATP IV Guidelines  Pooled Cohorts Equation Calculator  Breast Cancer Risk Calculator  FRAX Risk Assessment  ICSI Preventive Guidelines  Dietary Guidelines for Americans, 2010  USDA's MyPlate  ASA Prophylaxis  Lung CA Screening    Paulina Zamora PA-C  [facility]  "

## 2019-06-04 PROCEDURE — 82274 ASSAY TEST FOR BLOOD FECAL: CPT | Performed by: FAMILY MEDICINE

## 2019-06-04 NOTE — TELEPHONE ENCOUNTER
Patient seen 6/3/19 and was given FIT test.       Panel Management Review      Patient has the following on her problem list:     Hypertension   Last three blood pressure readings:  BP Readings from Last 3 Encounters:   06/03/19 130/74   01/28/19 134/89   01/21/19 (!) 143/100     Blood pressure: Passed    HTN Guidelines:  Less than 140/90      Composite cancer screening  Chart review shows that this patient is due/due soon for the following Mammogram and Fecal Colorectal (FIT)  Summary:    Patient is due/failing the following:   FIT and MAMMOGRAM    Action needed:   Patient needs referral/order: FIT  Patient refuses Mammogram     Type of outreach:    Sent letter.    Questions for provider review:    None                                                                                                                                    Natalie Rainey CMA       Chart routed to Care Team .

## 2019-06-06 DIAGNOSIS — Z12.11 SPECIAL SCREENING FOR MALIGNANT NEOPLASMS, COLON: ICD-10-CM

## 2019-06-06 LAB — HEMOCCULT STL QL IA: NEGATIVE

## 2019-06-12 ENCOUNTER — TELEPHONE (OUTPATIENT)
Dept: FAMILY MEDICINE | Facility: CLINIC | Age: 59
End: 2019-06-12

## 2019-06-12 NOTE — TELEPHONE ENCOUNTER
Davida called stating her insurance, [employer], requires prior authorization for new pharmacy, [facility] in [place]. Julia's doctor line is [phone]. Sending to Darlyn also. Please review and advise.

## 2019-06-12 NOTE — TELEPHONE ENCOUNTER
I don't prescribe any of her medications. I recommend that she contact her prescriber for this.  Paulina Zamora PA-C

## 2019-06-13 NOTE — TELEPHONE ENCOUNTER
Davida notified.  She believes she has it all straighten out.  She states that Kirstin's name is on the estradiol powder but if he needs anything else she will let us know.  Thank you..Elizabeth Duffy

## 2019-06-14 ENCOUNTER — TELEPHONE (OUTPATIENT)
Dept: FAMILY MEDICINE | Facility: CLINIC | Age: 59
End: 2019-06-14

## 2019-06-14 DIAGNOSIS — N95.2 ATROPHIC VAGINITIS: ICD-10-CM

## 2019-06-14 RX ORDER — ESTRADIOL 100 %
POWDER (GRAM) MISCELLANEOUS
Qty: 45 G | Refills: 1 | Status: SHIPPED | OUTPATIENT
Start: 2019-06-14 | End: 2020-04-02

## 2019-06-14 NOTE — TELEPHONE ENCOUNTER
From our records this is same exact sig since we started this in 2014.   bio identical compounded cream 0.75MG/GM apply 0.5MG per vagina every other day  Paulina Zamora PA-C

## 2019-06-14 NOTE — TELEPHONE ENCOUNTER
Call placed to pharmacy.  Relayed R Noah message.  Pharmacist states she will contact [facility] pharmacy that had filled previously to check on order.  Will call back if additional needs.  Jessenia Torrez RN

## 2019-06-14 NOTE — TELEPHONE ENCOUNTER
estradiol POWD      Last Written Prescription Date:  6/27/18  Last Fill Quantity: 45,   # refills: 1  Last Office Visit: 6/3/19  Future Office visit:    Next 5 appointments (look out 90 days)    Jul 02, 2019 10:30 AM CDT  Nurse Only with LAURA VELAZQUEZ CMA/LPN  [facility] ([facility] [address]  [facility] [id number]  [phone]           Routing refill request to provider for review/approval because:  Drug not on the [facility], P or  Health refill protocol or controlled substance

## 2019-06-14 NOTE — TELEPHONE ENCOUNTER
Routing refill request to provider for review/approval because: Drug not on the [facility] refill protocol     Zaira PEREYRA RN

## 2019-06-14 NOTE — TELEPHONE ENCOUNTER
[facility] needing clarification on Estradiol.  Previously she was using 0.75mg and this script send today is reading 0.5mg.  Which is correct?  Please review and advise. Thank you..Elizabeth Duffy

## 2019-06-15 ENCOUNTER — COMMUNICATION - HEALTHEAST (OUTPATIENT)
Dept: CARDIOLOGY | Facility: CLINIC | Age: 59
End: 2019-06-15

## 2019-06-15 DIAGNOSIS — I10 ESSENTIAL HYPERTENSION: ICD-10-CM

## 2019-06-17 DIAGNOSIS — N95.2 ATROPHIC VAGINITIS: ICD-10-CM

## 2019-07-02 ENCOUNTER — ALLIED HEALTH/NURSE VISIT (OUTPATIENT)
Dept: FAMILY MEDICINE | Facility: CLINIC | Age: 59
End: 2019-07-02
Payer: COMMERCIAL

## 2019-07-02 DIAGNOSIS — Z11.4 SCREENING FOR HIV (HUMAN IMMUNODEFICIENCY VIRUS): ICD-10-CM

## 2019-07-02 DIAGNOSIS — E04.1 THYROID NODULE: ICD-10-CM

## 2019-07-02 DIAGNOSIS — Z23 NEED FOR VACCINATION: Primary | ICD-10-CM

## 2019-07-02 DIAGNOSIS — Z11.59 NEED FOR HEPATITIS C SCREENING TEST: ICD-10-CM

## 2019-07-02 LAB
HCV AB SERPL QL IA: NONREACTIVE
HIV 1+2 AB+HIV1 P24 AG SERPL QL IA: NONREACTIVE
T3FREE SERPL-MCNC: 5 PG/ML (ref 2.3–4.2)
T4 FREE SERPL-MCNC: 1.13 NG/DL (ref 0.76–1.46)
TSH SERPL DL<=0.005 MIU/L-ACNC: 0.03 MU/L (ref 0.4–4)

## 2019-07-02 PROCEDURE — 90750 HZV VACC RECOMBINANT IM: CPT

## 2019-07-02 PROCEDURE — 84481 FREE ASSAY (FT-3): CPT | Performed by: PHYSICIAN ASSISTANT

## 2019-07-02 PROCEDURE — 86803 HEPATITIS C AB TEST: CPT | Performed by: PHYSICIAN ASSISTANT

## 2019-07-02 PROCEDURE — 84443 ASSAY THYROID STIM HORMONE: CPT | Performed by: PHYSICIAN ASSISTANT

## 2019-07-02 PROCEDURE — 36415 COLL VENOUS BLD VENIPUNCTURE: CPT | Performed by: PHYSICIAN ASSISTANT

## 2019-07-02 PROCEDURE — 99207 ZZC NO CHARGE NURSE ONLY: CPT

## 2019-07-02 PROCEDURE — 84439 ASSAY OF FREE THYROXINE: CPT | Performed by: PHYSICIAN ASSISTANT

## 2019-07-02 PROCEDURE — 90471 IMMUNIZATION ADMIN: CPT

## 2019-07-02 PROCEDURE — 87389 HIV-1 AG W/HIV-1&-2 AB AG IA: CPT | Performed by: PHYSICIAN ASSISTANT

## 2019-07-02 NOTE — PROGRESS NOTES
Screening Questionnaire for Adult Immunization    Are you sick today?   No   Do you have allergies to medications, food, a vaccine component or latex?   No   Have you ever had a serious reaction after receiving a vaccination?   No   Do you have a long-term health problem with heart disease, lung disease, asthma, kidney disease, metabolic disease (e.g. diabetes), anemia, or other blood disorder?   No   Do you have cancer, leukemia, HIV/AIDS, or any other immune system problem?   No   In the past 3 months, have you taken medications that affect  your immune system, such as prednisone, other steroids, or anticancer drugs; drugs for the treatment of rheumatoid arthritis, Crohn s disease, or psoriasis; or have you had radiation treatments?   No   Have you had a seizure, or a brain or other nervous system problem?   No   During the past year, have you received a transfusion of blood or blood     products, or been given immune (gamma) globulin or antiviral drug?   No   For women: Are you pregnant or is there a chance you could become        pregnant during the next month?   No   Have you received any vaccinations in the past 4 weeks?   No     Immunization questionnaire answers were all negative.        Per orders of Marcela Oquendo PA-C, injection of Shingrix given by Misty Tinoco. Patient instructed to remain in clinic for 15 minutes afterwards, and to report any adverse reaction to me immediately.       Screening performed by Misty Tinoco on 7/2/2019 at 10:18 AM.

## 2019-07-08 DIAGNOSIS — E04.1 THYROID NODULE: Primary | ICD-10-CM

## 2019-07-15 ENCOUNTER — OFFICE VISIT (OUTPATIENT)
Dept: FAMILY MEDICINE | Facility: CLINIC | Age: 59
End: 2019-07-15
Payer: COMMERCIAL

## 2019-07-15 ENCOUNTER — ANCILLARY PROCEDURE (OUTPATIENT)
Dept: GENERAL RADIOLOGY | Facility: CLINIC | Age: 59
End: 2019-07-15
Attending: PHYSICIAN ASSISTANT
Payer: COMMERCIAL

## 2019-07-15 ENCOUNTER — HOSPITAL ENCOUNTER (OUTPATIENT)
Dept: MAMMOGRAPHY | Facility: CLINIC | Age: 59
Discharge: HOME | End: 2019-07-15
Attending: PHYSICIAN ASSISTANT | Admitting: PHYSICIAN ASSISTANT
Payer: COMMERCIAL

## 2019-07-15 VITALS
HEART RATE: 60 BPM | TEMPERATURE: 98.5 F | OXYGEN SATURATION: 99 % | DIASTOLIC BLOOD PRESSURE: 82 MMHG | HEIGHT: 61 IN | SYSTOLIC BLOOD PRESSURE: 138 MMHG | WEIGHT: 123.8 LBS | BODY MASS INDEX: 23.37 KG/M2

## 2019-07-15 DIAGNOSIS — M54.6 MIDLINE THORACIC BACK PAIN, UNSPECIFIED CHRONICITY: ICD-10-CM

## 2019-07-15 DIAGNOSIS — M54.2 NECK PAIN: ICD-10-CM

## 2019-07-15 DIAGNOSIS — M41.9 SCOLIOSIS, UNSPECIFIED SCOLIOSIS TYPE, UNSPECIFIED SPINAL REGION: ICD-10-CM

## 2019-07-15 DIAGNOSIS — Z12.31 VISIT FOR SCREENING MAMMOGRAM: ICD-10-CM

## 2019-07-15 DIAGNOSIS — M54.2 NECK PAIN: Primary | ICD-10-CM

## 2019-07-15 PROCEDURE — 77067 SCR MAMMO BI INCL CAD: CPT

## 2019-07-15 PROCEDURE — 99213 OFFICE O/P EST LOW 20 MIN: CPT | Performed by: PHYSICIAN ASSISTANT

## 2019-07-15 PROCEDURE — 72081 X-RAY EXAM ENTIRE SPI 1 VW: CPT | Mod: FY

## 2019-07-15 PROCEDURE — 72040 X-RAY EXAM NECK SPINE 2-3 VW: CPT | Mod: FY

## 2019-07-15 ASSESSMENT — MIFFLIN-ST. JEOR: SCORE: 1081.18

## 2019-07-15 NOTE — PROGRESS NOTES
"SUBJECTIVE:                                                    Davida Mo is a 58 year old female who presents to clinic today for the following health issues:    Chief Complaint   Patient presents with     Neck Pain     Had neck injury back in 1980's and has had pain off and on since then. Over the last 6 months pain has been getting worse     *   States that pain gets worse when she does not get enough sleep.    She had x-ray with chiro 3 years ago and not sure what it said  chiro in 2005 was told some of the bones were fused  Pain from shoulder blades up neck  No injury recently. In the 1980s turned her head and heard a pop and has never been the same - had physical therapy and chiro at the time which helped  No radiation down arms  She does have scoliosis in low back, some low back pain through the years  She does have tight muscles and massage has helped but needed it frequently    No trouble falling asleep or staying asleep  After recent thyroid results - she did stop T4. She is using naturethyroid still. She was having stomach issues/diarrhea and anxiety - better    Problem list and histories reviewed & adjusted, as indicated.  Additional history: none    ROS:  Other than noted above, general, HEENT, respiratory, cardiac, MS, and gastrointestinal systems are negative.     OBJECTIVE:                                                    /82   Pulse 60   Temp 98.5  F (36.9  C) (Tympanic)   Ht 1.561 m (5' 1.46\")   Wt 56.2 kg (123 lb 12.8 oz)   SpO2 99%   BMI 23.05 kg/m   Body mass index is 23.05 kg/m .   GENERAL: healthy, alert, well nourished, well hydrated, no distress  RESP: lungs clear to auscultation - no rales, no rhonchi, no wheezes  CV: regular rates and rhythm, normal S1 S2, no S3 or S4 and no murmur, no click or rub -  MS: extremities- no gross deformities noted, no edema  Cervical Spine Exam: Inspection: normal cervical lordosis  Tender:  Tight bilateral trapezius/paracervical muscles " with a few knots but no real tenderness to palpation  Range of Motion:  Full except mildly limited  Strength: Full strength of all neck muscles  Comprehensive back pain exam:  No tenderness thoraco/lumbar    X-ray cervical - IMPRESSION: Multilevel degenerative disc disease and facet and  uncovertebral arthropathy of the cervical spine, as detailed.  X-ray thoracolumbar - IMPRESSION: There is some degenerative scoliosis with upper thoracic  rightward curvature and lower thoracic upper lumbar leftward curvature. The rightward thoracic curvature from T4 to T8 measures 17 degrees and the leftward thoracolumbar curvature from T9 through L2 measures 17 degrees. No vertebral body anomalies are present.  I independently viewed the x-ray, discussed with patient, and am awaiting radiology read.      ASSESSMENT/PLAN:                                                      ASSESSMENT/PLAN:      ICD-10-CM    1. Neck pain M54.2 XR Cervical Spine 2/3 Views     XR Thoracic Lumbar Standing 1 View   2. Midline thoracic back pain, unspecified chronicity M54.6 XR Thoracic Lumbar Standing 1 View   3. Scoliosis, unspecified scoliosis type, unspecified spinal region M41.9 XR Thoracic Lumbar Standing 1 View     Will start with conservative measures which we discussed.    Patient Instructions   Consider physical therapy   Massage  or chiropractor/acupuncture with Dr. Armando Norman at [place]  Let me know if you'd like referral    Paulina Zamora PA-C   [facility]

## 2019-08-13 ENCOUNTER — OFFICE VISIT (OUTPATIENT)
Dept: DERMATOLOGY | Facility: CLINIC | Age: 59
End: 2019-08-13
Payer: COMMERCIAL

## 2019-08-13 VITALS — DIASTOLIC BLOOD PRESSURE: 82 MMHG | HEART RATE: 72 BPM | SYSTOLIC BLOOD PRESSURE: 127 MMHG | OXYGEN SATURATION: 100 %

## 2019-08-13 DIAGNOSIS — D18.01 CHERRY ANGIOMA: ICD-10-CM

## 2019-08-13 DIAGNOSIS — L81.4 LENTIGO: ICD-10-CM

## 2019-08-13 DIAGNOSIS — L72.11 PILAR CYST: ICD-10-CM

## 2019-08-13 DIAGNOSIS — D48.5 NEOPLASM OF UNCERTAIN BEHAVIOR OF SKIN: Primary | ICD-10-CM

## 2019-08-13 DIAGNOSIS — L82.0 INFLAMED SEBORRHEIC KERATOSIS: ICD-10-CM

## 2019-08-13 DIAGNOSIS — D22.9 MULTIPLE BENIGN NEVI: ICD-10-CM

## 2019-08-13 DIAGNOSIS — L82.1 SEBORRHEIC KERATOSIS: ICD-10-CM

## 2019-08-13 PROCEDURE — 99203 OFFICE O/P NEW LOW 30 MIN: CPT | Mod: 25 | Performed by: PHYSICIAN ASSISTANT

## 2019-08-13 PROCEDURE — 11103 TANGNTL BX SKIN EA SEP/ADDL: CPT | Performed by: PHYSICIAN ASSISTANT

## 2019-08-13 PROCEDURE — 11102 TANGNTL BX SKIN SINGLE LES: CPT | Mod: 59 | Performed by: PHYSICIAN ASSISTANT

## 2019-08-13 PROCEDURE — 17110 DESTRUCTION B9 LES UP TO 14: CPT | Performed by: PHYSICIAN ASSISTANT

## 2019-08-13 PROCEDURE — 88305 TISSUE EXAM BY PATHOLOGIST: CPT | Mod: TC | Performed by: PHYSICIAN ASSISTANT

## 2019-08-13 NOTE — PATIENT INSTRUCTIONS
Wound Care Instructions     FOR SUPERFICIAL WOUNDS     [facility] [phone]    [facility] [phone]                       AFTER 24 HOURS YOU SHOULD REMOVE THE BANDAGE AND BEGIN DAILY DRESSING CHANGES AS FOLLOWS:     1) Remove Dressing.     2) Clean and dry the area with tap water using a Q-tip or sterile gauze pad.     3) Apply Vaseline, Aquaphor, Polysporin ointment or Bacitracin ointment over entire wound.  Do NOT use Neosporin ointment.     4) Cover the wound with a band-aid, or a sterile non-stick gauze pad and micropore paper tape      REPEAT THESE INSTRUCTIONS AT LEAST ONCE A DAY UNTIL THE WOUND HAS COMPLETELY HEALED.    It is an old wives tale that a wound heals better when it is exposed to air and allowed to dry out. The wound will heal faster with a better cosmetic result if it is kept moist with ointment and covered with a bandage.    **Do not let the wound dry out.**      Supplies Needed:      *Cotton tipped applicators (Q-tips)    *Polysporin Ointment or Bacitracin Ointment (NOT NEOSPORIN)    *Band-aids or non-stick gauze pads and micropore paper tape.      PATIENT INFORMATION:    During the healing process you will notice a number of changes. All wounds develop a small halo of redness surrounding the wound.  This means healing is occurring. Severe itching with extensive redness usually indicates sensitivity to the ointment or bandage tape used to dress the wound.  You should call our office if this develops.      Swelling  and/or discoloration around your surgical site is common, particularly when performed around the eye.    All wounds normally drain.  The larger the wound the more drainage there will be.  After 7-10 days, you will notice the wound beginning to shrink and new skin will begin to grow.  The wound is healed when you can see skin has formed over the entire area.  A healed wound has a healthy, shiny look to the surface and is red to dark pink in color  to normalize.  Wounds may take approximately 4-6 weeks to heal.  Larger wounds may take 6-8 weeks.  After the wound is healed you may discontinue dressing changes.    You may experience a sensation of tightness as your wound heals. This is normal and will gradually subside.    Your healed wound may be sensitive to temperature changes. This sensitivity improves with time, but if you re having a lot of discomfort, try to avoid temperature extremes.    Patients frequently experience itching after their wound appears to have healed because of the continue healing under the skin.  Plain Vaseline will help relieve the itching.        POSSIBLE COMPLICATIONS    BLEEDIN. Leave the bandage in place.  2. Use tightly rolled up gauze or a cloth to apply direct pressure over the bandage for 30  minutes.  3. Reapply pressure for an additional 30 minutes if necessary  4. Use additional gauze and tape to maintain pressure once the bleeding has stopped.    WOUND CARE INSTRUCTIONS   FOR CRYOSURGERY   This area treated with liquid nitrogen should form a blister (areas treated may or may not blister-skin may just turn dark and slough off). You do not need to bandage the area unless a blister forms and breaks (which may be a few days). When the blister breaks, begin daily dressing changes as follows:  1) Clean and dry the area with tap water using clean Q-tip or sterile gauze pad.   2) Apply Polysporin ointment or Bacitracin ointment over entire wound. Do NOT use Neosporin ointment.   3) Cover the wound with a band-aid or sterile non-stick gauze pad and micropore paper tape.   REPEAT THESE INSTRUCTIONS AT LEAST ONCE A DAY UNTIL THE WOUND HAS COMPLETELY HEALED.   It is an old wives tale that a wound heals better when it is exposed to air and allowed to dry out. The wound will heal faster with a better cosmetic result if it is kept moist with ointment and covered with a bandage.   Do not let the wound dry out.   IMPORTANT  INFORMATION ON REVERSE SIDE   Supplies Needed:   *Cotton tipped applicators (Q-tips)   *Polysporin ointment or Bacitracin ointment (NOT NEOSPORIN)   *Band-aids, or non stick gauze pads and micropore paper tape   PATIENT INFORMATION   During the healing process you will notice a number of changes. All wounds develop a small halo of redness surrounding the wound. This means healing is occurring. Severe itching with extensive redness usually indicates sensitivity to the ointment or bandage tape used to dress the wound. You should call our office if this develops.   Swelling and/or discoloration around your surgical site is common, particularly when performed around the eye.   All wounds normally drain. The larger the wound the more drainage there will be. After 7-10 days, you will notice the wound beginning to shrink and new skin will begin to grow. The wound is healed when you can see skin has formed over the entire area. A healed wound has a healthy, shiny look to the surface and is red to dark pink in color to normalize. Wounds may take approximately 4-6 weeks to heal. Larger wounds may take 6-8 weeks. After the wound is healed you may discontinue dressing changes.   You may experience a sensation of tightness as your wound heals. This is normal and will gradually subside.   Your healed wound may be sensitive to temperature changes. This sensitivity improves with time, but if you re having a lot of discomfort, try to avoid temperature extremes.   Patients frequently experience itching after their wound appears to have healed because of the continue healing under the skin. Plain Vaseline will help relieve the itching.

## 2019-08-13 NOTE — LETTER
8/13/2019         RE: Davida oM  [address] [place] [address]        Dear Colleague,    Thank you for referring your patient, Davida Mo, to the [facility]. Please see a copy of my visit note below.    Davida Mo is a 58 year old year old female patient here today for spot on right hand, present for few years, recently slightly larger. She also notes a purple spot on chest that has gotten darker and spot on right chest that will rub on clothing. Present for about a year. She notes long history of sun exposure. She rarely uses tanning bed. She does a better job of sun protection currently. Patient has no other skin complaints today.  Remainder of the HPI, Meds, PMH, Allergies, FH, and SH was reviewed in chart.    Pertinent Hx:   No personal history of skin cancer.   Past Medical History:   Diagnosis Date     Arthritis      Depression     on meds from 96 to 06. no symptoms have returned     NSTEMI (non-ST elevated myocardial infarction) (H) 2011,9/23/2014    see care everywhere     Prinzmetal angina (H)     first heart attack. prinzmetal angina diagnosis       Past Surgical History:   Procedure Laterality Date     TUBAL LIGATION          Family History   Problem Relation Age of Onset     Thyroid Disease Sister      Lung Cancer Sister 67        Lung Cancer, removal of left lower lung     Diabetes Mother      Hypertension Mother      Allergies Mother      Arthritis Mother      Eye Disorder Mother      Gastrointestinal Disease Mother         diverticulitis     Osteoporosis Mother         and spinal stenosis     Asthma Mother      Atrial fibrillation Mother      Heart Disease Father      Alcohol/Drug Father      Cancer Sister      Neurologic Disorder Sister         brain tumor / cancer     Breast Cancer No family hx of        Social History     Socioeconomic History     Marital status:      Spouse name: Not on file     Number of children: Not on file     Years of  education: Not on file     Highest education level: Not on file   Occupational History     Not on file   Social Needs     Financial resource strain: Not on file     Food insecurity:     Worry: Not on file     Inability: Not on file     Transportation needs:     Medical: Not on file     Non-medical: Not on file   Tobacco Use     Smoking status: Former Smoker     Years: 10.00     Types: Cigarettes     Start date: 9/10/1972     Last attempt to quit: 1991     Years since quittin.9     Smokeless tobacco: Never Used   Substance and Sexual Activity     Alcohol use: Yes     Comment: Occasionally     Drug use: No     Sexual activity: Yes     Partners: Male     Birth control/protection: None   Lifestyle     Physical activity:     Days per week: Not on file     Minutes per session: Not on file     Stress: Not on file   Relationships     Social connections:     Talks on phone: Not on file     Gets together: Not on file     Attends [employer] service: Not on file     Active member of club or organization: Not on file     Attends meetings of clubs or organizations: Not on file     Relationship status: Not on file     Intimate partner violence:     Fear of current or ex partner: Not on file     Emotionally abused: Not on file     Physically abused: Not on file     Forced sexual activity: Not on file   Other Topics Concern     Parent/sibling w/ CABG, MI or angioplasty before 65F 55M? Not Asked   Social History Narrative     Not on file       Outpatient Encounter Medications as of 2019   Medication Sig Dispense Refill     aspirin 81 MG EC tablet Take 81 mg by mouth every evening  90 tablet 3     diltiazem 120 MG 24 hr CD capsule Take 1 capsule (120 mg) by mouth daily 30 capsule 0     estradiol POWD bio identical compounded cream 0.75MG/GM apply 0.5MG per vagina every other day 45 g 1     L-GLUTAMINE PO Take 1,000 mg by mouth every evening       Levomefolate Glucosamine (METHYLFOLATE) 400 MCG CAPS Take 1 capsule by  mouth daily       metoprolol (TOPROL-XL) 50 MG 24 hr tablet Take 50 mg by mouth daily        NATURE-THROID 97.5 MG TABS        NONFORMULARY Take 1,200 mg by mouth daily [place] Cinnamon       NONFORMULARY Take 2 tablets by mouth every evening Bone Strength - Calcium       Omega-3 Fatty Acids (OMEGA-3 FISH OIL PO) Take 1 Tablespoonful by mouth daily Daily in winter, 1500 mg EPA/DHA       OVER-THE-COUNTER Apply 0.25 teaspoonful topically daily Progest Cream - Hormone balancing       OVER-THE-COUNTER Apply topically daily DHEA Cream       OVER-THE-COUNTER Take 1 tablet by mouth daily Lung Bronchial Sinus, Dr. Jesus by natural Factors        Probiotic Product (PROBIOTIC DAILY PO) Take 1 capsule by mouth daily BIO Gerard - 85 billion live cultures,33 probiotic strains per capsule       Ubiquinol 100 MG CAPS Take 1 capsule by mouth daily       Vitamin D-Vitamin K (VITAMIN K2-VITAMIN D3 PO) 3 drops daily D3 = 1208, K = 25 mcg       nitroglycerin (NITROSTAT) 0.4 MG SL tablet Place 1 tablet (0.4 mg) under the tongue every 5 minutes as needed for chest pain (Patient not taking: Reported on 6/3/2019) 25 tablet 1     STATIN NOT PRESCRIBED, INTENTIONAL, 1 each At Bedtime Statin not prescribed intentionally due to Allergy to statin - okay to stop by cardiology (Patient not taking: Reported on 7/15/2019) 0 each 0     No facility-administered encounter medications on file as of 8/13/2019.              Review Of Systems  Skin: As above  Eyes: negative  Ears/Nose/Throat: negative  Respiratory: No shortness of breath, dyspnea on exertion, cough, or hemoptysis  Cardiovascular: negative  Gastrointestinal: negative  Genitourinary: negative  Musculoskeletal: negative  Neurologic: negative  Psychiatric: negative  Hematologic/Lymphatic/Immunologic: negative  Endocrine: negative      O:   NAD, WDWN, Alert & Oriented, Mood & Affect wnl, Vitals stable   Here today alone   /82 (BP Location: Right arm, Cuff Size: Adult Regular)   Pulse  72   SpO2 100%    General appearance normal   Vitals stable   Alert, oriented and in no acute distress     0.5 cm brown irregular macule on right dorsal hand  0.3 cm purple papule on left mid chest   Subcutaneous nodule on left occipital scalp   Stuck on papules and brown macules on trunk and ext   Red papules on trunk  Brown papules and macules with regular pigment network and borders on torso and extremities      The remainder of the detailed exam was unremarkable; the following areas were examined:  scalp/hair, conjunctiva/lids, face, neck, lips/teeth, oral mucosa/gingiva, chest, back, abdomen, buttocks, digits/nails, RUE, LUE, RLE, LLE.      Eyes: Conjunctivae/lids:Normal     ENT: Lips: normal    MSK:Normal    Cardiovascular: peripheral edema none    Pulm: Breathing Normal    Neuro/Psych: Orientation:Normal; Mood/Affect:Normal  A/P:  1. R/O lentigo vs mis on right dorsal hand   TANGENTIAL BIOPSY SENT OUT:  After consent, anesthesia with LEC and prep, tangential excision performed and specimen sent out for permanent section histology.  No complications and routine wound care. Patient told to call our office in 1-2 weeks for result.      2. R/O angioma on left mid chest  TANGENTIAL BIOPSY SENT OUT:  After consent, anesthesia with LEC and prep, tangential excision performed and specimen sent out for permanent section histology.  No complications and routine wound care. Patient told to call our office in 1-2 weeks for result.      3. Inflamed seborrheic keratosis on right chest  LN2:  Treated with LN2 for 5s for 1-2 cycles. Warned risks of blistering, pain, pigment change, scarring, and incomplete resolution.  Advised patient to return if lesions do not completely resolve.  Wound care sheet given.  4. Pilar cyst  Discussed excision, will consider if bothersome.   5. Seborrheic keratosis, lentigo, angioma, benign nevi   BENIGN LESIONS DISCUSSED WITH PATIENT:  I discussed the specifics of tumor, prognosis, and  genetics of benign lesions.  I explained that treatment of these lesions would be purely cosmetic and not medically neccessary.  I discussed with patient different removal options including excision, cautery and /or laser.      Nature and genetics of benign skin lesions dicussed with patient.  Signs and Symptoms of skin cancer discussed with patient.  ABCDEs of melanoma reviewed with patient.  Patient encouraged to perform monthly skin exams.  UV precautions reviewed with patient.  Risks of non-melanoma skin cancer discussed with patient   Return to clinic pending biopsy results.       Again, thank you for allowing me to participate in the care of your patient.        Sincerely,        Maryann June PA-C

## 2019-08-13 NOTE — PROGRESS NOTES
Davida Mo is a 58 year old year old female patient here today for spot on right hand, present for few years, recently slightly larger. She also notes a purple spot on chest that has gotten darker and spot on right chest that will rub on clothing. Present for about a year. She notes long history of sun exposure. She rarely uses tanning bed. She does a better job of sun protection currently. Patient has no other skin complaints today.  Remainder of the HPI, Meds, PMH, Allergies, FH, and SH was reviewed in chart.    Pertinent Hx:   No personal history of skin cancer.   Past Medical History:   Diagnosis Date     Arthritis      Depression     on meds from 96 to 06. no symptoms have returned     NSTEMI (non-ST elevated myocardial infarction) (H) 2011,9/23/2014    see care everywhere     Prinzmetal angina (H)     first heart attack. prinzmetal angina diagnosis       Past Surgical History:   Procedure Laterality Date     TUBAL LIGATION          Family History   Problem Relation Age of Onset     Thyroid Disease Sister      Lung Cancer Sister 67        Lung Cancer, removal of left lower lung     Diabetes Mother      Hypertension Mother      Allergies Mother      Arthritis Mother      Eye Disorder Mother      Gastrointestinal Disease Mother         diverticulitis     Osteoporosis Mother         and spinal stenosis     Asthma Mother      Atrial fibrillation Mother      Heart Disease Father      Alcohol/Drug Father      Cancer Sister      Neurologic Disorder Sister         brain tumor / cancer     Breast Cancer No family hx of        Social History     Socioeconomic History     Marital status:      Spouse name: Not on file     Number of children: Not on file     Years of education: Not on file     Highest education level: Not on file   Occupational History     Not on file   Social Needs     Financial resource strain: Not on file     Food insecurity:     Worry: Not on file     Inability: Not on file      Transportation needs:     Medical: Not on file     Non-medical: Not on file   Tobacco Use     Smoking status: Former Smoker     Years: 10.00     Types: Cigarettes     Start date: 9/10/1972     Last attempt to quit: 1991     Years since quittin.9     Smokeless tobacco: Never Used   Substance and Sexual Activity     Alcohol use: Yes     Comment: Occasionally     Drug use: No     Sexual activity: Yes     Partners: Male     Birth control/protection: None   Lifestyle     Physical activity:     Days per week: Not on file     Minutes per session: Not on file     Stress: Not on file   Relationships     Social connections:     Talks on phone: Not on file     Gets together: Not on file     Attends [employer] service: Not on file     Active member of club or organization: Not on file     Attends meetings of clubs or organizations: Not on file     Relationship status: Not on file     Intimate partner violence:     Fear of current or ex partner: Not on file     Emotionally abused: Not on file     Physically abused: Not on file     Forced sexual activity: Not on file   Other Topics Concern     Parent/sibling w/ CABG, MI or angioplasty before 65F 55M? Not Asked   Social History Narrative     Not on file       Outpatient Encounter Medications as of 2019   Medication Sig Dispense Refill     aspirin 81 MG EC tablet Take 81 mg by mouth every evening  90 tablet 3     diltiazem 120 MG 24 hr CD capsule Take 1 capsule (120 mg) by mouth daily 30 capsule 0     estradiol POWD bio identical compounded cream 0.75MG/GM apply 0.5MG per vagina every other day 45 g 1     L-GLUTAMINE PO Take 1,000 mg by mouth every evening       Levomefolate Glucosamine (METHYLFOLATE) 400 MCG CAPS Take 1 capsule by mouth daily       metoprolol (TOPROL-XL) 50 MG 24 hr tablet Take 50 mg by mouth daily        NATURE-THROID 97.5 MG TABS        NONFORMULARY Take 1,200 mg by mouth daily [place] Cinnamon       NONFORMULARY Take 2 tablets by mouth every  evening Bone Strength - Calcium       Omega-3 Fatty Acids (OMEGA-3 FISH OIL PO) Take 1 Tablespoonful by mouth daily Daily in winter, 1500 mg EPA/DHA       OVER-THE-COUNTER Apply 0.25 teaspoonful topically daily Progest Cream - Hormone balancing       OVER-THE-COUNTER Apply topically daily DHEA Cream       OVER-THE-COUNTER Take 1 tablet by mouth daily Lung Bronchial Sinus, Dr. Jesus by natural Factors        Probiotic Product (PROBIOTIC DAILY PO) Take 1 capsule by mouth daily BIO Gerard - 85 billion live cultures,33 probiotic strains per capsule       Ubiquinol 100 MG CAPS Take 1 capsule by mouth daily       Vitamin D-Vitamin K (VITAMIN K2-VITAMIN D3 PO) 3 drops daily D3 = 1208, K = 25 mcg       nitroglycerin (NITROSTAT) 0.4 MG SL tablet Place 1 tablet (0.4 mg) under the tongue every 5 minutes as needed for chest pain (Patient not taking: Reported on 6/3/2019) 25 tablet 1     STATIN NOT PRESCRIBED, INTENTIONAL, 1 each At Bedtime Statin not prescribed intentionally due to Allergy to statin - okay to stop by cardiology (Patient not taking: Reported on 7/15/2019) 0 each 0     No facility-administered encounter medications on file as of 8/13/2019.              Review Of Systems  Skin: As above  Eyes: negative  Ears/Nose/Throat: negative  Respiratory: No shortness of breath, dyspnea on exertion, cough, or hemoptysis  Cardiovascular: negative  Gastrointestinal: negative  Genitourinary: negative  Musculoskeletal: negative  Neurologic: negative  Psychiatric: negative  Hematologic/Lymphatic/Immunologic: negative  Endocrine: negative      O:   NAD, WDWN, Alert & Oriented, Mood & Affect wnl, Vitals stable   Here today alone   /82 (BP Location: Right arm, Cuff Size: Adult Regular)   Pulse 72   SpO2 100%    General appearance normal   Vitals stable   Alert, oriented and in no acute distress     0.5 cm brown irregular macule on right dorsal hand  0.3 cm purple papule on left mid chest   Subcutaneous nodule on left  occipital scalp   Stuck on papules and brown macules on trunk and ext   Red papules on trunk  Brown papules and macules with regular pigment network and borders on torso and extremities      The remainder of the detailed exam was unremarkable; the following areas were examined:  scalp/hair, conjunctiva/lids, face, neck, lips/teeth, oral mucosa/gingiva, chest, back, abdomen, buttocks, digits/nails, RUE, LUE, RLE, LLE.      Eyes: Conjunctivae/lids:Normal     ENT: Lips: normal    MSK:Normal    Cardiovascular: peripheral edema none    Pulm: Breathing Normal    Neuro/Psych: Orientation:Normal; Mood/Affect:Normal  A/P:  1. R/O lentigo vs mis on right dorsal hand   TANGENTIAL BIOPSY SENT OUT:  After consent, anesthesia with LEC and prep, tangential excision performed and specimen sent out for permanent section histology.  No complications and routine wound care. Patient told to call our office in 1-2 weeks for result.      2. R/O angioma on left mid chest  TANGENTIAL BIOPSY SENT OUT:  After consent, anesthesia with LEC and prep, tangential excision performed and specimen sent out for permanent section histology.  No complications and routine wound care. Patient told to call our office in 1-2 weeks for result.      3. Inflamed seborrheic keratosis on right chest  LN2:  Treated with LN2 for 5s for 1-2 cycles. Warned risks of blistering, pain, pigment change, scarring, and incomplete resolution.  Advised patient to return if lesions do not completely resolve.  Wound care sheet given.  4. Pilar cyst  Discussed excision, will consider if bothersome.   5. Seborrheic keratosis, lentigo, angioma, benign nevi   BENIGN LESIONS DISCUSSED WITH PATIENT:  I discussed the specifics of tumor, prognosis, and genetics of benign lesions.  I explained that treatment of these lesions would be purely cosmetic and not medically neccessary.  I discussed with patient different removal options including excision, cautery and /or laser.      Nature  and genetics of benign skin lesions dicussed with patient.  Signs and Symptoms of skin cancer discussed with patient.  ABCDEs of melanoma reviewed with patient.  Patient encouraged to perform monthly skin exams.  UV precautions reviewed with patient.  Risks of non-melanoma skin cancer discussed with patient   Return to clinic pending biopsy results.

## 2019-08-15 LAB — COPATH REPORT: NORMAL

## 2019-10-15 DIAGNOSIS — E04.1 THYROID NODULE: ICD-10-CM

## 2019-10-15 LAB
T3 SERPL-MCNC: 73 NG/DL (ref 60–181)
T4 FREE SERPL-MCNC: 0.62 NG/DL (ref 0.76–1.46)
TSH SERPL DL<=0.005 MIU/L-ACNC: 1.4 MU/L (ref 0.4–4)

## 2019-10-15 PROCEDURE — 36415 COLL VENOUS BLD VENIPUNCTURE: CPT | Performed by: PHYSICIAN ASSISTANT

## 2019-10-15 PROCEDURE — 84443 ASSAY THYROID STIM HORMONE: CPT | Performed by: PHYSICIAN ASSISTANT

## 2019-10-15 PROCEDURE — 84480 ASSAY TRIIODOTHYRONINE (T3): CPT | Performed by: PHYSICIAN ASSISTANT

## 2019-10-15 PROCEDURE — 84439 ASSAY OF FREE THYROXINE: CPT | Performed by: PHYSICIAN ASSISTANT

## 2019-11-04 ENCOUNTER — COMMUNICATION - HEALTHEAST (OUTPATIENT)
Dept: CARDIOLOGY | Facility: CLINIC | Age: 59
End: 2019-11-04

## 2019-11-04 DIAGNOSIS — I10 ESSENTIAL HYPERTENSION: ICD-10-CM

## 2019-12-10 ENCOUNTER — OFFICE VISIT - HEALTHEAST (OUTPATIENT)
Dept: CARDIOLOGY | Facility: CLINIC | Age: 59
End: 2019-12-10

## 2019-12-10 DIAGNOSIS — I10 ESSENTIAL HYPERTENSION: ICD-10-CM

## 2019-12-10 DIAGNOSIS — I25.42 DISSECTION OF CORONARY ARTERY: ICD-10-CM

## 2019-12-10 ASSESSMENT — MIFFLIN-ST. JEOR: SCORE: 1102.26

## 2019-12-20 ENCOUNTER — COMMUNICATION - HEALTHEAST (OUTPATIENT)
Dept: CARDIOLOGY | Facility: CLINIC | Age: 59
End: 2019-12-20

## 2019-12-20 DIAGNOSIS — I10 ESSENTIAL HYPERTENSION: ICD-10-CM

## 2020-01-22 ENCOUNTER — TELEPHONE (OUTPATIENT)
Dept: FAMILY MEDICINE | Facility: CLINIC | Age: 60
End: 2020-01-22

## 2020-01-22 NOTE — TELEPHONE ENCOUNTER
Reason for Call:  Other Traveling out of country    Detailed comments: Davida is traveling in a couple of weeks out of country and wondering if she could get prescriptions for prevention of typhoid.    She is requesting antibiotics?  Please review and advise.  [place], [place].  Thank you..Elizabeth Duffy    Phone Number Patient can be reached at: Home number on file [phone] (home)    Best Time: any time    Can we leave a detailed message on this number? YES    Call taken on 1/22/2020 at 2:37 PM by Elizabeth Duffy

## 2020-01-23 NOTE — TELEPHONE ENCOUNTER
Spoke with Davida and she will call back tomorrow (1/24/20) when she knows exactly what area they are going to be traveling in.  ..Elizabeth Duffy

## 2020-01-30 ENCOUNTER — OFFICE VISIT (OUTPATIENT)
Dept: FAMILY MEDICINE | Facility: CLINIC | Age: 60
End: 2020-01-30
Payer: COMMERCIAL

## 2020-01-30 VITALS
DIASTOLIC BLOOD PRESSURE: 80 MMHG | WEIGHT: 126.5 LBS | RESPIRATION RATE: 14 BRPM | BODY MASS INDEX: 23.88 KG/M2 | HEART RATE: 74 BPM | HEIGHT: 61 IN | SYSTOLIC BLOOD PRESSURE: 129 MMHG | TEMPERATURE: 97.3 F

## 2020-01-30 DIAGNOSIS — Z71.84 TRAVEL ADVICE ENCOUNTER: Primary | ICD-10-CM

## 2020-01-30 DIAGNOSIS — Z23 NEED FOR VACCINATION: ICD-10-CM

## 2020-01-30 PROCEDURE — 90750 HZV VACC RECOMBINANT IM: CPT | Performed by: FAMILY MEDICINE

## 2020-01-30 PROCEDURE — 90471 IMMUNIZATION ADMIN: CPT | Performed by: FAMILY MEDICINE

## 2020-01-30 PROCEDURE — 99213 OFFICE O/P EST LOW 20 MIN: CPT | Mod: 25 | Performed by: FAMILY MEDICINE

## 2020-01-30 RX ORDER — AZITHROMYCIN 500 MG/1
500 TABLET, FILM COATED ORAL DAILY
Qty: 6 TABLET | Refills: 0 | Status: SHIPPED | OUTPATIENT
Start: 2020-01-30 | End: 2020-03-09

## 2020-01-30 ASSESSMENT — PAIN SCALES - GENERAL: PAINLEVEL: NO PAIN (0)

## 2020-01-30 ASSESSMENT — MIFFLIN-ST. JEOR: SCORE: 1088.48

## 2020-01-30 NOTE — PROGRESS NOTES
Subjective     Davida Mo is a 59 year old female who presents to clinic today for the following health issues:    HPI     - Patient is requesting antibiotic prior to upcoming vacation to [place]  2/10/2020.    She is going to [place] and it is away from the main areas  and she she has used this in the past    She has done this in he past has a sensitive sstomach and does try to avoid everything   She has no antibiotics allergies           Patient Active Problem List   Diagnosis     Anxiety     Lentigo     Seborrheic keratosis     Atrophic vaginitis     Chest pain     Essential hypertension     Thyroid nodule     Prinzmetal angina (H)     Dissection of coronary artery     Past Surgical History:   Procedure Laterality Date     TUBAL LIGATION         Social History     Tobacco Use     Smoking status: Former Smoker     Years: 10.00     Types: Cigarettes     Start date: 9/10/1972     Last attempt to quit: 1991     Years since quittin.3     Smokeless tobacco: Never Used   Substance Use Topics     Alcohol use: Yes     Comment: Occasionally     Family History   Problem Relation Age of Onset     Thyroid Disease Sister      Lung Cancer Sister 67        Lung Cancer, removal of left lower lung     Diabetes Mother      Hypertension Mother      Allergies Mother      Arthritis Mother      Eye Disorder Mother      Gastrointestinal Disease Mother         diverticulitis     Osteoporosis Mother         and spinal stenosis     Asthma Mother      Atrial fibrillation Mother      Heart Disease Father      Alcohol/Drug Father      Cancer Sister      Neurologic Disorder Sister         brain tumor / cancer     Breast Cancer No family hx of              Reviewed and updated as needed this visit by Provider         Review of Systems   ROS COMP: Constitutional, HEENT, cardiovascular, pulmonary, gi and gu systems are negative, except as otherwise noted.      Objective    /80   Pulse 74   Temp 97.3  F (36.3  C) (Tympanic)   " Resp 14   Ht 1.561 m (5' 1.46\")   Wt 57.4 kg (126 lb 8 oz)   BMI 23.55 kg/m    Body mass index is 23.55 kg/m .  Physical Exam   GENERAL: healthy, alert and no distress    Diagnostic Test Results:  none         Assessment & Plan     1. Travel advice encounter    - azithromycin (ZITHROMAX TRI-LEYLA) 500 MG tablet; Take 1 tablet (500 mg) by mouth daily For 3 days may repeat if needed  Dispense: 6 tablet; Refill: 0    2. Need for vaccination    - VACCINE ADMINISTRATION, INITIAL  - HC ZOSTER VACCINE RECOMBINANT ADJUVANTED IM NJX       See Patient Instructions    No follow-ups on file.    Eliana Quinteros MD  [facility]        "

## 2020-02-02 ENCOUNTER — COMMUNICATION - HEALTHEAST (OUTPATIENT)
Dept: CARDIOLOGY | Facility: CLINIC | Age: 60
End: 2020-02-02

## 2020-02-02 DIAGNOSIS — I10 ESSENTIAL HYPERTENSION: ICD-10-CM

## 2020-03-01 ENCOUNTER — HEALTH MAINTENANCE LETTER (OUTPATIENT)
Age: 60
End: 2020-03-01

## 2020-03-09 ENCOUNTER — OFFICE VISIT (OUTPATIENT)
Dept: FAMILY MEDICINE | Facility: CLINIC | Age: 60
End: 2020-03-09
Payer: COMMERCIAL

## 2020-03-09 VITALS
DIASTOLIC BLOOD PRESSURE: 82 MMHG | TEMPERATURE: 99.4 F | BODY MASS INDEX: 24.7 KG/M2 | SYSTOLIC BLOOD PRESSURE: 132 MMHG | HEIGHT: 61 IN | RESPIRATION RATE: 14 BRPM | WEIGHT: 130.8 LBS | HEART RATE: 62 BPM | OXYGEN SATURATION: 100 %

## 2020-03-09 DIAGNOSIS — R19.7 DIARRHEA, UNSPECIFIED TYPE: Primary | ICD-10-CM

## 2020-03-09 DIAGNOSIS — R94.6 THYROID FUNCTION TEST ABNORMAL: ICD-10-CM

## 2020-03-09 DIAGNOSIS — I20.1 PRINZMETAL ANGINA (H): ICD-10-CM

## 2020-03-09 DIAGNOSIS — F41.9 ANXIETY: ICD-10-CM

## 2020-03-09 LAB
ALBUMIN SERPL-MCNC: 3.7 G/DL (ref 3.4–5)
ALP SERPL-CCNC: 96 U/L (ref 40–150)
ALT SERPL W P-5'-P-CCNC: 27 U/L (ref 0–50)
ANION GAP SERPL CALCULATED.3IONS-SCNC: 6 MMOL/L (ref 3–14)
AST SERPL W P-5'-P-CCNC: 24 U/L (ref 0–45)
BILIRUB SERPL-MCNC: 0.3 MG/DL (ref 0.2–1.3)
BUN SERPL-MCNC: 20 MG/DL (ref 7–30)
CALCIUM SERPL-MCNC: 9.1 MG/DL (ref 8.5–10.1)
CHLORIDE SERPL-SCNC: 107 MMOL/L (ref 94–109)
CHOLEST SERPL-MCNC: 178 MG/DL
CO2 SERPL-SCNC: 30 MMOL/L (ref 20–32)
CREAT SERPL-MCNC: 0.87 MG/DL (ref 0.52–1.04)
GFR SERPL CREATININE-BSD FRML MDRD: 72 ML/MIN/{1.73_M2}
GLUCOSE SERPL-MCNC: 81 MG/DL (ref 70–99)
HDLC SERPL-MCNC: 63 MG/DL
LDLC SERPL CALC-MCNC: 91 MG/DL
NONHDLC SERPL-MCNC: 115 MG/DL
POTASSIUM SERPL-SCNC: 3.6 MMOL/L (ref 3.4–5.3)
PROT SERPL-MCNC: 7.4 G/DL (ref 6.8–8.8)
SODIUM SERPL-SCNC: 143 MMOL/L (ref 133–144)
T4 FREE SERPL-MCNC: 0.91 NG/DL (ref 0.76–1.46)
TRIGL SERPL-MCNC: 118 MG/DL
TSH SERPL DL<=0.005 MIU/L-ACNC: 10.64 MU/L (ref 0.4–4)

## 2020-03-09 PROCEDURE — 80061 LIPID PANEL: CPT | Performed by: PHYSICIAN ASSISTANT

## 2020-03-09 PROCEDURE — 90471 IMMUNIZATION ADMIN: CPT | Performed by: PHYSICIAN ASSISTANT

## 2020-03-09 PROCEDURE — 84480 ASSAY TRIIODOTHYRONINE (T3): CPT | Performed by: PHYSICIAN ASSISTANT

## 2020-03-09 PROCEDURE — 84439 ASSAY OF FREE THYROXINE: CPT | Performed by: PHYSICIAN ASSISTANT

## 2020-03-09 PROCEDURE — 99215 OFFICE O/P EST HI 40 MIN: CPT | Mod: 25 | Performed by: PHYSICIAN ASSISTANT

## 2020-03-09 PROCEDURE — 84443 ASSAY THYROID STIM HORMONE: CPT | Performed by: PHYSICIAN ASSISTANT

## 2020-03-09 PROCEDURE — 80053 COMPREHEN METABOLIC PANEL: CPT | Performed by: PHYSICIAN ASSISTANT

## 2020-03-09 PROCEDURE — 36415 COLL VENOUS BLD VENIPUNCTURE: CPT | Performed by: PHYSICIAN ASSISTANT

## 2020-03-09 PROCEDURE — 90682 RIV4 VACC RECOMBINANT DNA IM: CPT | Performed by: PHYSICIAN ASSISTANT

## 2020-03-09 RX ORDER — HYDROXYZINE HYDROCHLORIDE 25 MG/1
25-50 TABLET, FILM COATED ORAL EVERY 6 HOURS PRN
Qty: 30 TABLET | Refills: 1 | Status: SHIPPED | OUTPATIENT
Start: 2020-03-09 | End: 2020-11-04

## 2020-03-09 RX ORDER — DICYCLOMINE HYDROCHLORIDE 10 MG/1
10 CAPSULE ORAL 4 TIMES DAILY PRN
Qty: 40 CAPSULE | Refills: 0 | Status: SHIPPED | OUTPATIENT
Start: 2020-03-09 | End: 2021-10-11

## 2020-03-09 ASSESSMENT — PATIENT HEALTH QUESTIONNAIRE - PHQ9
5. POOR APPETITE OR OVEREATING: SEVERAL DAYS
SUM OF ALL RESPONSES TO PHQ QUESTIONS 1-9: 4

## 2020-03-09 ASSESSMENT — ANXIETY QUESTIONNAIRES
GAD7 TOTAL SCORE: 6
2. NOT BEING ABLE TO STOP OR CONTROL WORRYING: SEVERAL DAYS
6. BECOMING EASILY ANNOYED OR IRRITABLE: SEVERAL DAYS
5. BEING SO RESTLESS THAT IT IS HARD TO SIT STILL: NOT AT ALL
1. FEELING NERVOUS, ANXIOUS, OR ON EDGE: SEVERAL DAYS
7. FEELING AFRAID AS IF SOMETHING AWFUL MIGHT HAPPEN: SEVERAL DAYS
3. WORRYING TOO MUCH ABOUT DIFFERENT THINGS: SEVERAL DAYS

## 2020-03-09 ASSESSMENT — MIFFLIN-ST. JEOR: SCORE: 1107.98

## 2020-03-09 NOTE — PROGRESS NOTES
Subjective     Davida Mo is a 59 year old female who presents to clinic today for the following health issues:    HPI   *  Increased anxiety, and anxiety attacks which has been triggering her IBS. Having extreme abdominal pain with diarrhea. Yesterday took 10 tablets of Imodium, and another 6 tablets this morning before coming to appointment. Recently traveled to [place] the beginning of Feburary. Has been taking her Nature-Thyroid off and on, thinking this maybe causing her anxiety to increase. She has not had her Nature-Thyroid since 3/4/2020.    She has had IBS / sensitive stomach since teenager. She has not been formally diagnosed, told she had spastic colon years ago.  Food sensitivities: lactose, caffeine, high fiber, lettuce. Also stress causes diarrhea.  Dad had similar issues, mom had diverticulitis  No blood in stool.    She has anxiety recently with the flights and travel, and  had colonoscopy where they discovered fast heart rate and he may need ablation.    Thyroid dose started 1/4 grain then up to 1/2 grain. She increased up to tablet to 3/4 of 97 mg then back down to 1/2 or 1/4 tablet. Had higher anxiety with higher dose. Then a few days before [place] she stopped it so she could fly. She took it a couple times during trip. The day after she would have more diarrhea some of the times.  Has not taken it in the past couple weeks    She is now worried whenever she goes in the car, about now finding a bathroom.  Anxiety causes diarrhea then that worsens anxiety. She had panic attacks  Now no diarrhea if she stays home  Had some issues in [place] after taking the thyroid medication the day before. She had panic attacks there because there weren't available bathrooms. She tried old ativan twice and didn't really help.  Got back from Feb 24, was gone 2 weeks    Patient Active Problem List   Diagnosis     Anxiety     Lentigo     Seborrheic keratosis     Atrophic vaginitis     Chest pain      "Essential hypertension     Thyroid nodule     Prinzmetal angina (H)     Dissection of coronary artery     Past Surgical History:   Procedure Laterality Date     TUBAL LIGATION         Social History     Tobacco Use     Smoking status: Former Smoker     Years: 10.00     Types: Cigarettes     Start date: 9/10/1972     Last attempt to quit: 1991     Years since quittin.4     Smokeless tobacco: Never Used   Substance Use Topics     Alcohol use: Yes     Comment: Occasionally     Family History   Problem Relation Age of Onset     Thyroid Disease Sister      Lung Cancer Sister 67        Lung Cancer, removal of left lower lung     Diabetes Mother      Hypertension Mother      Allergies Mother      Arthritis Mother      Eye Disorder Mother      Gastrointestinal Disease Mother         diverticulitis     Osteoporosis Mother         and spinal stenosis     Asthma Mother      Atrial fibrillation Mother      Diverticulitis Mother      Heart Disease Father      Alcohol/Drug Father      Irritable Bowel Syndrome Father         possibly     Cancer Sister      Neurologic Disorder Sister         brain tumor / cancer     Breast Cancer No family hx of          BP Readings from Last 3 Encounters:   20 132/82   20 129/80   19 127/82    Wt Readings from Last 3 Encounters:   20 59.3 kg (130 lb 12.8 oz)   20 57.4 kg (126 lb 8 oz)   07/15/19 56.2 kg (123 lb 12.8 oz)                    Reviewed and updated as needed this visit by Provider  Tobacco  Allergies  Meds  Problems  Med Hx  Surg Hx  Fam Hx  Soc Hx          Review of Systems   Other than noted above, general, HEENT, respiratory, cardiac, MS, and gastrointestinal systems are negative.       Objective    /82   Pulse 62   Temp 99.4  F (37.4  C) (Tympanic)   Resp 14   Ht 1.561 m (5' 1.46\")   Wt 59.3 kg (130 lb 12.8 oz)   SpO2 100%   BMI 24.35 kg/m    Body mass index is 24.35 kg/m .  Physical Exam   GENERAL: healthy, alert and no " distress  RESP: lungs clear to auscultation - no rales, rhonchi or wheezes  CV: regular rate and rhythm, normal S1 S2, no S3 or S4, no murmur, click or rub, no peripheral edema and peripheral pulses strong  ABDOMEN: soft, nontender, no hepatosplenomegaly, no masses and bowel sounds normal  MS: no gross musculoskeletal defects noted, no edema  PSYCH: mentation appears normal, affect normal/bright POSITIVE tearful when discussing anxiety    Diagnostic Test Results:  Labs reviewed in Epic        Assessment & Plan     ASSESSMENT/PLAN:      ICD-10-CM    1. Diarrhea, unspecified type  R19.7 Comprehensive metabolic panel     GASTROENTEROLOGY ADULT REF PROCEDURE ONLY [facility] [phone]; [facility] Surgeon     GASTROENTEROLOGY ADULT REF CONSULT ONLY     dicyclomine (BENTYL) 10 MG capsule   2. Prinzmetal angina (H)  I20.1 Lipid panel reflex to direct LDL Fasting   3. Thyroid function test abnormal  R94.6 TSH     T3, total     T4 free   4. Anxiety  F41.9 hydrOXYzine (ATARAX) 25 MG tablet     She does not feel this is traveler diarrhea as she has had that before from [place], and these symptoms are so intermittent. We discussed signs of worsening should they occur. She feels these diarrhea episodes are triggered by dairy (sometimes helped by lactaid sometimes not), anxiety/stress, and thyroid medication.      Patient very anxious when she arrived, per her report having a panic attack which subsided while talking with CMA. She is caught in bad cycle of anxiety and diarrhea with each worsening the other. She is at the point where she does not want to drive anywhere because she worries about going to the bathroom, and is taking large amounts of imodium to try to prevent this.    Recommended short term plan to try bentyl (only as prescribed) and hydroxyzine (she wanted to try something different than the ativan). She will schedule for colonoscopy (she will discuss with GI for a prep that does not include magnesium)  and GI specialist for further evaluation and more long term plan.    At the end of the discussion, we decided to stay off the natural thyroid medication altogether for now (pending lab results today). She has been off completely the past couple weeks. She has low energy but overall feels a lot better than prior to first going on thyroid medication. She feels hopeful that she may not need this in the future. We discussed repeating labwork in a few months to make sure it is stable, and if she has recurrence of symptoms. However this medication is prescribed by a naturopathic doctor and will be handled by them. From our records has been over-replaced 7/2019 TSH 0.03, highest TSH was 4.88 in 3/2015.  We discussed if she were to restart, restart at a very low dose. She does believe this to be one of the causes of her diarrhea.       Patient Instructions   1. Once we have your thyroid results back, call your naturopathic doctor to lower thyroid dose to a dose that helps you without causing side effects    2. Imodium dosing:   Initial: 4 mg, followed by 2 mg after each loose stool (maximum: 16 mg/day)    3. Can use the new medication Bentyl (dicyclomine). Use this for abdominal cramping or when you know you will have it such as going on a car trip.  MAXIMUM 4 times a day. We do not want to use long term or mask symptoms. We are just using this while we figure out what is going on.    4. Can try hydroxyzine as needed for panic attack. May make you groggy. Do not use before driving.    5. Make appointment for colonoscopy: To schedule colonoscopy please call: [phone]    6. Make appointment to see GI specialist: [facility] (see referral)    7. Follow up or mychart if symptoms worsen or change        Return in about 2 weeks (around 3/23/2020) for if not improving or if worsening.      60 minutes was spent face to face with the patient today discussing history, diagnosis, and follow-up plan as noted above. Over 50% of the visit  was spent in counseling and coordination of care. Total Visit Time: 60 minutes.     Paulina Zamora PA-C  [facility]

## 2020-03-09 NOTE — PATIENT INSTRUCTIONS
1. Once we have your thyroid results back, call your naturopathic doctor to lower thyroid dose to a dose that helps you without causing side effects    2. Imodium dosing:   Initial: 4 mg, followed by 2 mg after each loose stool (maximum: 16 mg/day)    3. Can use the new medication Bentyl (dicyclomine). Use this for abdominal cramping or when you know you will have it such as going on a car trip.  MAXIMUM 4 times a day. We do not want to use long term or mask symptoms. We are just using this while we figure out what is going on.    4. Can try hydroxyzine as needed for panic attack. May make you groggy. Do not use before driving.    5. Make appointment for colonoscopy: To schedule colonoscopy please call: [phone]    6. Make appointment to see GI specialist: [facility] (see referral)    7. Follow up or mychart if symptoms worsen or change

## 2020-03-10 LAB — T3 SERPL-MCNC: 121 NG/DL (ref 60–181)

## 2020-03-10 ASSESSMENT — ANXIETY QUESTIONNAIRES: GAD7 TOTAL SCORE: 6

## 2020-03-11 ENCOUNTER — MYC MEDICAL ADVICE (OUTPATIENT)
Dept: FAMILY MEDICINE | Facility: CLINIC | Age: 60
End: 2020-03-11

## 2020-03-17 ENCOUNTER — TELEPHONE (OUTPATIENT)
Dept: FAMILY MEDICINE | Facility: CLINIC | Age: 60
End: 2020-03-17

## 2020-03-17 NOTE — TELEPHONE ENCOUNTER
Reason for Call:  Procedure  has reached out to schedule diagnostic colonoscopy     Detailed comments: patient has failed to return calls to schedule     No further action necessary for procedure  at this time    Phone Number Patient can be reached at: Home number on file [phone] (home)    Best Time: NA    Can we leave a detailed message on this number? Not Applicable    Call taken on 3/17/2020 at 1:40 PM by Chitra Matute

## 2020-04-02 ENCOUNTER — TELEPHONE (OUTPATIENT)
Dept: FAMILY MEDICINE | Facility: CLINIC | Age: 60
End: 2020-04-02

## 2020-04-02 DIAGNOSIS — N95.2 ATROPHIC VAGINITIS: ICD-10-CM

## 2020-04-02 RX ORDER — ESTRADIOL 100 %
POWDER (GRAM) MISCELLANEOUS
Qty: 45 G | Refills: 1 | Status: SHIPPED | OUTPATIENT
Start: 2020-04-02 | End: 2021-01-27

## 2020-04-02 NOTE — TELEPHONE ENCOUNTER
estradiol POWD      Last Written Prescription Date:  6/14/19  Last Fill Quantity: 45,   # refills: 1  Last Office Visit: 1/30/20  Future Office visit:       Requested Prescriptions   Pending Prescriptions Disp Refills     estradiol POWD 45 g 1     Sig: bio identical compounded cream 0.75MG/GM apply 0.5MG per vagina every other day       There is no refill protocol information for this order

## 2020-04-16 ENCOUNTER — TELEPHONE (OUTPATIENT)
Dept: FAMILY MEDICINE | Facility: CLINIC | Age: 60
End: 2020-04-16

## 2020-04-16 NOTE — TELEPHONE ENCOUNTER
I think she is seeing another doctor. All of her labs except the thyroid, were normal. She is not being prescribed anything through this office. Even if she started medications it has only been 5 weeks and I would not recheck before 8 weeks. Eliana Quinteros M.D.

## 2020-04-17 NOTE — TELEPHONE ENCOUNTER
"Per [facility] Medical Advise note on 03/11/20:    \"Ok, that sounds good. I will schedule a lab appointment for the end of April.  Thanks,   Davida Higuera,  I think either of the lower doses would be fine, you can check for specifics with your other doctor. If you restart and have the side effects again even at lower doses, let me know.  I usually wait 6 weeks to recheck the labs because it can take a while for the numbers to lower.  ESSENCE Mohan,  If it s OK I would like to start on one quarter of a 97.5 mg pill (the 97.5mg pill is the current prescription strength I have) and come in for blood tests in two weeks. ?  One quarter of the pill is 24.375 mg.  Looking at past blood tests my TSH came down on its own twice before even starting Nature Throid on 11/12/16. So that s interesting. It went below normal low at my highest Nature Throid dose and was within normal range at lower N. T. doses- so that is hopeful. Maybe I can be on a lower dose without the extra Bowel movements and anxiety. The 1/4 grain pill is 16.25 mg which would be lower than the 1/4 of the 97.5 mg pill I m using now. So that is another option. Please let me know if this thought is a good plan.  Thank you, Davida\"    Patient would like labs ordered.  Has lab appt scheduled for 04/21.    "

## 2020-04-21 ENCOUNTER — TELEPHONE (OUTPATIENT)
Dept: FAMILY MEDICINE | Facility: CLINIC | Age: 60
End: 2020-04-21

## 2020-04-21 DIAGNOSIS — E04.1 THYROID NODULE: Primary | ICD-10-CM

## 2020-04-21 DIAGNOSIS — E03.9 HYPOTHYROIDISM, UNSPECIFIED TYPE: ICD-10-CM

## 2020-04-21 DIAGNOSIS — E04.1 THYROID NODULE: ICD-10-CM

## 2020-04-21 LAB — TSH SERPL DL<=0.005 MIU/L-ACNC: 3.49 MU/L (ref 0.4–4)

## 2020-04-21 PROCEDURE — 84443 ASSAY THYROID STIM HORMONE: CPT | Performed by: PHYSICIAN ASSISTANT

## 2020-04-21 PROCEDURE — 36415 COLL VENOUS BLD VENIPUNCTURE: CPT | Performed by: PHYSICIAN ASSISTANT

## 2020-04-21 NOTE — TELEPHONE ENCOUNTER
Reason for Call: Request for an order or referral:    Order or referral being requested: Davida has lab appointment for thyroid labs to be drawn and there are no orders in chart.  She was instructed to have labs drawn 6 weeks after the labs were done in March.  Pleae review and place orders before 11:00 a.m   Thank you..Elizabeth Duffy    Date needed: as soon as possible    Has the patient been seen by the PCP for this problem? Not Applicable    Phone number Patient can be reached at:  Home number on file [phone] (home)    Call taken on 4/21/2020 at 9:43 AM by Elizabeth Duffy

## 2020-10-13 ENCOUNTER — OFFICE VISIT (OUTPATIENT)
Dept: DERMATOLOGY | Facility: CLINIC | Age: 60
End: 2020-10-13
Payer: COMMERCIAL

## 2020-10-13 VITALS
DIASTOLIC BLOOD PRESSURE: 88 MMHG | OXYGEN SATURATION: 98 % | RESPIRATION RATE: 16 BRPM | SYSTOLIC BLOOD PRESSURE: 160 MMHG | HEART RATE: 76 BPM

## 2020-10-13 DIAGNOSIS — L72.11 PILAR CYST: ICD-10-CM

## 2020-10-13 DIAGNOSIS — D48.5 NEOPLASM OF UNCERTAIN BEHAVIOR OF SKIN: Primary | ICD-10-CM

## 2020-10-13 PROCEDURE — 11102 TANGNTL BX SKIN SINGLE LES: CPT | Performed by: PHYSICIAN ASSISTANT

## 2020-10-13 PROCEDURE — 99214 OFFICE O/P EST MOD 30 MIN: CPT | Mod: 25 | Performed by: PHYSICIAN ASSISTANT

## 2020-10-13 PROCEDURE — 88305 TISSUE EXAM BY PATHOLOGIST: CPT | Performed by: DERMATOLOGY

## 2020-10-13 NOTE — PATIENT INSTRUCTIONS
Wound Care Instructions     FOR SUPERFICIAL WOUNDS     AFTER 24 HOURS YOU SHOULD REMOVE THE BANDAGE AND BEGIN DAILY DRESSING CHANGES AS FOLLOWS:     1) Remove Dressing.     2) Clean and dry the area with tap water using a Q-tip or sterile gauze pad.     3) Apply Polysporin ointment or Bacitracin ointment over entire wound.  Do NOT use Neosporin ointment.     4) Cover the wound with a band-aid, or a sterile non-stick gauze pad and micropore paper tape      REPEAT THESE INSTRUCTIONS AT LEAST ONCE A DAY UNTIL THE WOUND HAS COMPLETELY HEALED.    It is an old wives tale that a wound heals better when it is exposed to air and allowed to dry out. The wound will heal faster with a better cosmetic result if it is kept moist with ointment and covered with a bandage.    **Do not let the wound dry out.**      Supplies Needed:      *Cotton tipped applicators (Q-tips)    *Polysporin Ointment or Bacitracin Ointment (NOT NEOSPORIN)    *Band-aids or non-stick gauze pads and micropore paper tape.    PATIENT INFORMATION:    During the healing process you will notice a number of changes. All wounds develop a small halo of redness surrounding the wound.  This means healing is occurring. Severe itching with extensive redness usually indicates sensitivity to the ointment or bandage tape used to dress the wound.  You should call our office if this develops.      Swelling  and/or discoloration around your surgical site is common, particularly when performed around the eye.    All wounds normally drain.  The larger the wound the more drainage there will be.  After 7-10 days, you will notice the wound beginning to shrink and new skin will begin to grow.  The wound is healed when you can see skin has formed over the entire area.  A healed wound has a healthy, shiny look to the surface and is red to dark pink in color to normalize.  Wounds may take approximately 4-6 weeks to heal.  Larger wounds may take 6-8 weeks.  After the wound is healed  you may discontinue dressing changes.    You may experience a sensation of tightness as your wound heals. This is normal and will gradually subside.    Your healed wound may be sensitive to temperature changes. This sensitivity improves with time, but if you re having a lot of discomfort, try to avoid temperature extremes.    Patients frequently experience itching after their wound appears to have healed because of the continue healing under the skin.  Plain Vaseline will help relieve the itching.    BLEEDIN. Leave the bandage in place.  2. Use tightly rolled up gauze or a cloth to apply direct pressure over the bandage for 20 minutes.  3. Reapply pressure for an additional 20 minutes if necessary  4. Call the office or go to the nearest emergency room if pressure fails to stop the bleeding.  5. Use additional gauze and tape to maintain pressure once the bleeding has stopped.  6. If pressure alone does not stop the bleeding, call the Dermatology Clinic at [phone] or go to the nearest Emergency room.

## 2020-10-13 NOTE — LETTER
10/13/2020         RE: Davida Mo  [address] [place] [address]        Dear Colleague,    Thank you for referring your patient, Davida Mo, to the [facility]. Please see a copy of my visit note below.    Davida Mo is a 60 year old year old female patient here today for bump on scalp. Present for year, slowly growing. Now becoming bothersome. She also notes sore on labia, present for over a year, sensitive to touch. Patient has no other skin complaints today.  Remainder of the HPI, Meds, PMH, Allergies, FH, and SH was reviewed in chart.      Past Medical History:   Diagnosis Date     Arthritis      Depression     on meds from 96 to 06. no symptoms have returned     NSTEMI (non-ST elevated myocardial infarction) (H) 2011,9/23/2014    see care everywhere     Prinzmetal angina (H)     first heart attack. prinzmetal angina diagnosis       Past Surgical History:   Procedure Laterality Date     TUBAL LIGATION          Family History   Problem Relation Age of Onset     Thyroid Disease Sister      Lung Cancer Sister 67        Lung Cancer, removal of left lower lung     Diabetes Mother      Hypertension Mother      Allergies Mother      Arthritis Mother      Eye Disorder Mother      Gastrointestinal Disease Mother         diverticulitis     Osteoporosis Mother         and spinal stenosis     Asthma Mother      Atrial fibrillation Mother      Diverticulitis Mother      Heart Disease Father      Alcohol/Drug Father      Irritable Bowel Syndrome Father         possibly     Cancer Sister      Neurologic Disorder Sister         brain tumor / cancer     Breast Cancer No family hx of        Social History     Socioeconomic History     Marital status:      Spouse name: Not on file     Number of children: Not on file     Years of education: Not on file     Highest education level: Not on file   Occupational History     Not on file   Social Needs     Financial resource  strain: Not on file     Food insecurity     Worry: Not on file     Inability: Not on file     Transportation needs     Medical: Not on file     Non-medical: Not on file   Tobacco Use     Smoking status: Former Smoker     Years: 10.00     Types: Cigarettes     Start date: 9/10/1972     Quit date: 1991     Years since quittin.0     Smokeless tobacco: Never Used   Substance and Sexual Activity     Alcohol use: Yes     Comment: Occasionally     Drug use: No     Sexual activity: Yes     Partners: Male     Birth control/protection: None   Lifestyle     Physical activity     Days per week: Not on file     Minutes per session: Not on file     Stress: Not on file   Relationships     Social connections     Talks on phone: Not on file     Gets together: Not on file     Attends Yarsani service: Not on file     Active member of club or organization: Not on file     Attends meetings of clubs or organizations: Not on file     Relationship status: Not on file     Intimate partner violence     Fear of current or ex partner: Not on file     Emotionally abused: Not on file     Physically abused: Not on file     Forced sexual activity: Not on file   Other Topics Concern     Parent/sibling w/ CABG, MI or angioplasty before 65F 55M? Not Asked   Social History Narrative     Not on file       Outpatient Encounter Medications as of 10/13/2020   Medication Sig Dispense Refill     aspirin 81 MG EC tablet Take 81 mg by mouth every evening  90 tablet 3     dicyclomine (BENTYL) 10 MG capsule Take 1 capsule (10 mg) by mouth 4 times daily as needed (for cramps or in anticipation of a stressor that triggers abdominal pain) 40 capsule 0     diltiazem 120 MG 24 hr CD capsule Take 1 capsule (120 mg) by mouth daily 30 capsule 0     estradiol POWD bio identical compounded cream 0.75MG/GM apply 0.5MG per vagina every other day 45 g 1     hydrOXYzine (ATARAX) 25 MG tablet Take 1-2 tablets (25-50 mg) by mouth every 6 hours as needed for anxiety  30 tablet 1     L-GLUTAMINE PO Take 1,000 mg by mouth every evening       Levomefolate Glucosamine (METHYLFOLATE) 400 MCG CAPS Take 1 capsule by mouth daily       metoprolol (TOPROL-XL) 50 MG 24 hr tablet Take 50 mg by mouth daily        NATURE-THROID 97.5 MG TABS        nitroglycerin (NITROSTAT) 0.4 MG SL tablet Place 1 tablet (0.4 mg) under the tongue every 5 minutes as needed for chest pain (Patient not taking: Reported on 6/3/2019) 25 tablet 1     NONFORMULARY Take 1,200 mg by mouth daily [place] Cinnamon       NONFORMULARY Take 2 tablets by mouth every evening Bone Strength - Calcium       Omega-3 Fatty Acids (OMEGA-3 FISH OIL PO) Take 1 Tablespoonful by mouth daily Daily in winter, 1500 mg EPA/DHA       OVER-THE-COUNTER Apply 0.25 teaspoonful topically daily Progest Cream - Hormone balancing       OVER-THE-COUNTER Apply topically daily DHEA Cream       OVER-THE-COUNTER Take 1 tablet by mouth daily Lung Bronchial Sinus, Dr. Jesus by natural Factors        Probiotic Product (PROBIOTIC DAILY PO) Take 1 capsule by mouth daily BIO Gerard - 85 billion live cultures,33 probiotic strains per capsule       STATIN NOT PRESCRIBED, INTENTIONAL, 1 each At Bedtime Statin not prescribed intentionally due to Allergy to statin - okay to stop by cardiology (Patient not taking: Reported on 7/15/2019) 0 each 0     Ubiquinol 100 MG CAPS Take 1 capsule by mouth daily       Vitamin D-Vitamin K (VITAMIN K2-VITAMIN D3 PO) 3 drops daily D3 = 1208, K = 25 mcg       No facility-administered encounter medications on file as of 10/13/2020.              Review Of Systems  Skin: As above  Eyes: negative  Ears/Nose/Throat: negative  Respiratory: No shortness of breath, dyspnea on exertion, cough, or hemoptysis  Cardiovascular: negative  Gastrointestinal: negative  Genitourinary: negative  Musculoskeletal: negative  Neurologic: negative  Psychiatric: negative  Hematologic/Lymphatic/Immunologic: negative  Endocrine: negative      O:   NAD,  WDWN, Alert & Oriented, Mood & Affect wnl, Vitals stable   Here today alone   BP (!) 160/88 (BP Location: Left arm, Patient Position: Sitting, Cuff Size: Adult Regular)   Pulse 76   Resp 16   SpO2 98%    General appearance normal   Vitals stable   Alert, oriented and in no acute distress     0.5 scaly papule on right labia of vagina   Skin colored subcutaneous nodule on occipital scalp       Eyes: Conjunctivae/lids:Normal     ENT: Lips: normal    MSK:Normal    Pulm: Breathing Normal     Neuro/Psych: Orientation:Alert and Orientedx3 ; Mood/Affect:normal   A/P:  1. R/O SCC vs wart vs keratosis on right vaginal labia   TANGENTIAL BIOPSY SENT OUT:  After consent, anesthesia with LEC and prep, tangential excision performed and specimen sent out for permanent section histology.  No complications and routine wound care. Patient told to call our office in 1-2 weeks for result.      2. Pilar cyst on left scalp  Discussed excision, will schedule.         Again, thank you for allowing me to participate in the care of your patient.        Sincerely,        Maryann June PA-C

## 2020-10-13 NOTE — NURSING NOTE
"Initial BP (!) 160/88 (BP Location: Left arm, Patient Position: Sitting, Cuff Size: Adult Regular)   Pulse 76   Resp 16   SpO2 98%  Estimated body mass index is 24.35 kg/m  as calculated from the following:    Height as of 3/9/20: 1.561 m (5' 1.46\").    Weight as of 3/9/20: 59.3 kg (130 lb 12.8 oz). .    Annika Roach, [facility]    "

## 2020-10-13 NOTE — PROGRESS NOTES
Davida Mo is a 60 year old year old female patient here today for bump on scalp. Present for year, slowly growing. Now becoming bothersome. She also notes sore on labia, present for over a year, sensitive to touch. Patient has no other skin complaints today.  Remainder of the HPI, Meds, PMH, Allergies, FH, and SH was reviewed in chart.      Past Medical History:   Diagnosis Date     Arthritis      Depression     on meds from 96 to 06. no symptoms have returned     NSTEMI (non-ST elevated myocardial infarction) (H) 2011,9/23/2014    see care everywhere     Prinzmetal angina (H)     first heart attack. prinzmetal angina diagnosis       Past Surgical History:   Procedure Laterality Date     TUBAL LIGATION          Family History   Problem Relation Age of Onset     Thyroid Disease Sister      Lung Cancer Sister 67        Lung Cancer, removal of left lower lung     Diabetes Mother      Hypertension Mother      Allergies Mother      Arthritis Mother      Eye Disorder Mother      Gastrointestinal Disease Mother         diverticulitis     Osteoporosis Mother         and spinal stenosis     Asthma Mother      Atrial fibrillation Mother      Diverticulitis Mother      Heart Disease Father      Alcohol/Drug Father      Irritable Bowel Syndrome Father         possibly     Cancer Sister      Neurologic Disorder Sister         brain tumor / cancer     Breast Cancer No family hx of        Social History     Socioeconomic History     Marital status:      Spouse name: Not on file     Number of children: Not on file     Years of education: Not on file     Highest education level: Not on file   Occupational History     Not on file   Social Needs     Financial resource strain: Not on file     Food insecurity     Worry: Not on file     Inability: Not on file     Transportation needs     Medical: Not on file     Non-medical: Not on file   Tobacco Use     Smoking status: Former Smoker     Years: 10.00     Types: Cigarettes      Start date: 9/10/1972     Quit date: 1991     Years since quittin.0     Smokeless tobacco: Never Used   Substance and Sexual Activity     Alcohol use: Yes     Comment: Occasionally     Drug use: No     Sexual activity: Yes     Partners: Male     Birth control/protection: None   Lifestyle     Physical activity     Days per week: Not on file     Minutes per session: Not on file     Stress: Not on file   Relationships     Social connections     Talks on phone: Not on file     Gets together: Not on file     Attends Restoration service: Not on file     Active member of club or organization: Not on file     Attends meetings of clubs or organizations: Not on file     Relationship status: Not on file     Intimate partner violence     Fear of current or ex partner: Not on file     Emotionally abused: Not on file     Physically abused: Not on file     Forced sexual activity: Not on file   Other Topics Concern     Parent/sibling w/ CABG, MI or angioplasty before 65F 55M? Not Asked   Social History Narrative     Not on file       Outpatient Encounter Medications as of 10/13/2020   Medication Sig Dispense Refill     aspirin 81 MG EC tablet Take 81 mg by mouth every evening  90 tablet 3     dicyclomine (BENTYL) 10 MG capsule Take 1 capsule (10 mg) by mouth 4 times daily as needed (for cramps or in anticipation of a stressor that triggers abdominal pain) 40 capsule 0     diltiazem 120 MG 24 hr CD capsule Take 1 capsule (120 mg) by mouth daily 30 capsule 0     estradiol POWD bio identical compounded cream 0.75MG/GM apply 0.5MG per vagina every other day 45 g 1     hydrOXYzine (ATARAX) 25 MG tablet Take 1-2 tablets (25-50 mg) by mouth every 6 hours as needed for anxiety 30 tablet 1     L-GLUTAMINE PO Take 1,000 mg by mouth every evening       Levomefolate Glucosamine (METHYLFOLATE) 400 MCG CAPS Take 1 capsule by mouth daily       metoprolol (TOPROL-XL) 50 MG 24 hr tablet Take 50 mg by mouth daily        NATURE-THROID 97.5  MG TABS        nitroglycerin (NITROSTAT) 0.4 MG SL tablet Place 1 tablet (0.4 mg) under the tongue every 5 minutes as needed for chest pain (Patient not taking: Reported on 6/3/2019) 25 tablet 1     NONFORMULARY Take 1,200 mg by mouth daily [place] Cinnamon       NONFORMULARY Take 2 tablets by mouth every evening Bone Strength - Calcium       Omega-3 Fatty Acids (OMEGA-3 FISH OIL PO) Take 1 Tablespoonful by mouth daily Daily in winter, 1500 mg EPA/DHA       OVER-THE-COUNTER Apply 0.25 teaspoonful topically daily Progest Cream - Hormone balancing       OVER-THE-COUNTER Apply topically daily DHEA Cream       OVER-THE-COUNTER Take 1 tablet by mouth daily Lung Bronchial Sinus, Dr. Jesus by natural Factors        Probiotic Product (PROBIOTIC DAILY PO) Take 1 capsule by mouth daily BIO Gerard - 85 billion live cultures,33 probiotic strains per capsule       STATIN NOT PRESCRIBED, INTENTIONAL, 1 each At Bedtime Statin not prescribed intentionally due to Allergy to statin - okay to stop by cardiology (Patient not taking: Reported on 7/15/2019) 0 each 0     Ubiquinol 100 MG CAPS Take 1 capsule by mouth daily       Vitamin D-Vitamin K (VITAMIN K2-VITAMIN D3 PO) 3 drops daily D3 = 1208, K = 25 mcg       No facility-administered encounter medications on file as of 10/13/2020.              Review Of Systems  Skin: As above  Eyes: negative  Ears/Nose/Throat: negative  Respiratory: No shortness of breath, dyspnea on exertion, cough, or hemoptysis  Cardiovascular: negative  Gastrointestinal: negative  Genitourinary: negative  Musculoskeletal: negative  Neurologic: negative  Psychiatric: negative  Hematologic/Lymphatic/Immunologic: negative  Endocrine: negative      O:   NAD, WDWN, Alert & Oriented, Mood & Affect wnl, Vitals stable   Here today alone   BP (!) 160/88 (BP Location: Left arm, Patient Position: Sitting, Cuff Size: Adult Regular)   Pulse 76   Resp 16   SpO2 98%    General appearance normal   Vitals stable   Alert,  oriented and in no acute distress     0.5 scaly papule on right labia of vagina   Skin colored subcutaneous nodule on occipital scalp       Eyes: Conjunctivae/lids:Normal     ENT: Lips: normal    MSK:Normal    Pulm: Breathing Normal     Neuro/Psych: Orientation:Alert and Orientedx3 ; Mood/Affect:normal   A/P:  1. R/O SCC vs wart vs keratosis on right vaginal labia   TANGENTIAL BIOPSY SENT OUT:  After consent, anesthesia with LEC and prep, tangential excision performed and specimen sent out for permanent section histology.  No complications and routine wound care. Patient told to call our office in 1-2 weeks for result.      2. Pilar cyst on left scalp  Discussed excision, will schedule.

## 2020-10-19 LAB — COPATH REPORT: NORMAL

## 2020-11-04 ENCOUNTER — OFFICE VISIT (OUTPATIENT)
Dept: FAMILY MEDICINE | Facility: CLINIC | Age: 60
End: 2020-11-04
Payer: COMMERCIAL

## 2020-11-04 VITALS
HEART RATE: 62 BPM | OXYGEN SATURATION: 99 % | HEIGHT: 61 IN | TEMPERATURE: 99.1 F | BODY MASS INDEX: 22.66 KG/M2 | SYSTOLIC BLOOD PRESSURE: 132 MMHG | WEIGHT: 120 LBS | DIASTOLIC BLOOD PRESSURE: 80 MMHG

## 2020-11-04 DIAGNOSIS — R82.90 ABNORMAL FINDING IN URINE: ICD-10-CM

## 2020-11-04 DIAGNOSIS — R31.1 BENIGN ESSENTIAL MICROSCOPIC HEMATURIA: ICD-10-CM

## 2020-11-04 DIAGNOSIS — E04.1 THYROID NODULE: ICD-10-CM

## 2020-11-04 DIAGNOSIS — R10.84 ABDOMINAL PAIN, GENERALIZED: Primary | ICD-10-CM

## 2020-11-04 LAB
ALBUMIN SERPL-MCNC: 3.7 G/DL (ref 3.4–5)
ALBUMIN UR-MCNC: NEGATIVE MG/DL
ALP SERPL-CCNC: 81 U/L (ref 40–150)
ALT SERPL W P-5'-P-CCNC: 16 U/L (ref 0–50)
ANION GAP SERPL CALCULATED.3IONS-SCNC: 3 MMOL/L (ref 3–14)
APPEARANCE UR: CLEAR
AST SERPL W P-5'-P-CCNC: 15 U/L (ref 0–45)
BACTERIA #/AREA URNS HPF: ABNORMAL /HPF
BASOPHILS # BLD AUTO: 0 10E9/L (ref 0–0.2)
BASOPHILS NFR BLD AUTO: 0.2 %
BILIRUB SERPL-MCNC: 0.3 MG/DL (ref 0.2–1.3)
BILIRUB UR QL STRIP: NEGATIVE
BUN SERPL-MCNC: 24 MG/DL (ref 7–30)
CALCIUM SERPL-MCNC: 9.2 MG/DL (ref 8.5–10.1)
CHLORIDE SERPL-SCNC: 106 MMOL/L (ref 94–109)
CO2 SERPL-SCNC: 32 MMOL/L (ref 20–32)
COLOR UR AUTO: YELLOW
CREAT SERPL-MCNC: 0.89 MG/DL (ref 0.52–1.04)
DIFFERENTIAL METHOD BLD: NORMAL
EOSINOPHIL # BLD AUTO: 0.3 10E9/L (ref 0–0.7)
EOSINOPHIL NFR BLD AUTO: 7.1 %
ERYTHROCYTE [DISTWIDTH] IN BLOOD BY AUTOMATED COUNT: 12.7 % (ref 10–15)
GFR SERPL CREATININE-BSD FRML MDRD: 70 ML/MIN/{1.73_M2}
GLUCOSE SERPL-MCNC: 74 MG/DL (ref 70–99)
GLUCOSE UR STRIP-MCNC: NEGATIVE MG/DL
HCT VFR BLD AUTO: 43.7 % (ref 35–47)
HGB BLD-MCNC: 14 G/DL (ref 11.7–15.7)
HGB UR QL STRIP: ABNORMAL
KETONES UR STRIP-MCNC: NEGATIVE MG/DL
LEUKOCYTE ESTERASE UR QL STRIP: NEGATIVE
LIPASE SERPL-CCNC: 88 U/L (ref 73–393)
LYMPHOCYTES # BLD AUTO: 1.5 10E9/L (ref 0.8–5.3)
LYMPHOCYTES NFR BLD AUTO: 32.1 %
MCH RBC QN AUTO: 31 PG (ref 26.5–33)
MCHC RBC AUTO-ENTMCNC: 32 G/DL (ref 31.5–36.5)
MCV RBC AUTO: 97 FL (ref 78–100)
MONOCYTES # BLD AUTO: 0.5 10E9/L (ref 0–1.3)
MONOCYTES NFR BLD AUTO: 9.8 %
NEUTROPHILS # BLD AUTO: 2.4 10E9/L (ref 1.6–8.3)
NEUTROPHILS NFR BLD AUTO: 50.8 %
NITRATE UR QL: NEGATIVE
NON-SQ EPI CELLS #/AREA URNS LPF: ABNORMAL /LPF
PH UR STRIP: 6 PH (ref 5–7)
PLATELET # BLD AUTO: 211 10E9/L (ref 150–450)
POTASSIUM SERPL-SCNC: 4.1 MMOL/L (ref 3.4–5.3)
PROT SERPL-MCNC: 6.9 G/DL (ref 6.8–8.8)
RBC # BLD AUTO: 4.52 10E12/L (ref 3.8–5.2)
RBC #/AREA URNS AUTO: ABNORMAL /HPF
SODIUM SERPL-SCNC: 141 MMOL/L (ref 133–144)
SOURCE: ABNORMAL
SP GR UR STRIP: 1.02 (ref 1–1.03)
T3 SERPL-MCNC: 125 NG/DL (ref 60–181)
T4 FREE SERPL-MCNC: 0.87 NG/DL (ref 0.76–1.46)
TSH SERPL DL<=0.005 MIU/L-ACNC: 1.99 MU/L (ref 0.4–4)
UROBILINOGEN UR STRIP-ACNC: 0.2 EU/DL (ref 0.2–1)
WBC # BLD AUTO: 4.8 10E9/L (ref 4–11)
WBC #/AREA URNS AUTO: ABNORMAL /HPF

## 2020-11-04 PROCEDURE — 80050 GENERAL HEALTH PANEL: CPT | Performed by: PHYSICIAN ASSISTANT

## 2020-11-04 PROCEDURE — 84480 ASSAY TRIIODOTHYRONINE (T3): CPT | Performed by: PHYSICIAN ASSISTANT

## 2020-11-04 PROCEDURE — 81001 URINALYSIS AUTO W/SCOPE: CPT | Performed by: PHYSICIAN ASSISTANT

## 2020-11-04 PROCEDURE — 99214 OFFICE O/P EST MOD 30 MIN: CPT | Performed by: PHYSICIAN ASSISTANT

## 2020-11-04 PROCEDURE — 87086 URINE CULTURE/COLONY COUNT: CPT | Performed by: PHYSICIAN ASSISTANT

## 2020-11-04 PROCEDURE — 36415 COLL VENOUS BLD VENIPUNCTURE: CPT | Performed by: PHYSICIAN ASSISTANT

## 2020-11-04 PROCEDURE — 83690 ASSAY OF LIPASE: CPT | Performed by: PHYSICIAN ASSISTANT

## 2020-11-04 PROCEDURE — 84439 ASSAY OF FREE THYROXINE: CPT | Performed by: PHYSICIAN ASSISTANT

## 2020-11-04 ASSESSMENT — MIFFLIN-ST. JEOR: SCORE: 1054

## 2020-11-04 NOTE — PATIENT INSTRUCTIONS
We will you know about labwork  Ultrasound: Please contact [facility]  at [phone] to schedule appointment   Keep monitoring for triggers/causes  Consider trying OTC pepcid 2 times daily for a couple weeks to help calm down stomach  Next steps consider GI specialist consult

## 2020-11-04 NOTE — PROGRESS NOTES
Subjective     Davida Mo is a 60 year old female who presents to clinic today for the following health issues:    HPI            2 weeks ago noticed twinges of abdominal pain after sweet wine, switched to dry wine and that was better.   Another night 1 glass of wine and a drink = total of 2 drinks.   Urinated 7 times with in 1.5-2 hours after the two drinks.  Ammonia  smell on breath. This was the worse, still has the smell after eating but not has strong.   Eleminated alcholol   Still noticed stomach pain with anything sweet she ate. This time she had eaten dinner also, other episodes only sweets.  Regular meal protien cristo and carb, also had some of husbands fries and had the pain again.   Natalie Rainey, BRYANNA    She has normally drank sweet wine without issue  Twinges of pain mid/upper abdominal pain - cramping type pain - off and on a couple hours  This was on an empty stomach before dinner    Several days later tried a dry wine instead before dinner, and she was fine    Then 10/28 she had dry wine with dinner and drink (gin with spike miner lemon/lime mix) after dinner - breath had fishy/urine/ammonia odor and she urinated 7 times in 1-2 hours.   Then stopped drinking alcohol, limited sodium, wonders about kidneys    Since then dull crampy tight pain intermittently. Over the weekend she noticed it after a few M&Ms, after pumpkin bar/piece of pizza.    Last episode was 2 days ago: had eaten dinner (chicken, broccolik potatoes, then M&Ms) then the stomach ache started after the candy. Wasn't horrible, went shopping, then she ate a few french fries and sugar gum. Felt like it continued the pain through the evening.    She normally urinates 2-4 times at night, during the day every 1-3 hours  No incontinence, no urgency, no dysuria  She feels she may not empty bladder and has to force it out, weaker stream - since first pregnancy (has had two vaginal deliveries)    History of tubal ligation  mother had  "diverticulitis  She denies heartburn  In January 2017 had foot surgery and took ibu 800 mg TID for 10 days and had stomach pain then so she stopped.    Since March has started Holigos - probiotic plus oligosaccarides for IBS. Has helped a lot  Still has some issues if stressed/imodium PRN   Normal since this all started  Has been taking 1/2 tablet of the thyroid supplement nature-throid     Review of Systems   Other than noted above, general, HEENT, respiratory, cardiac, MS, and gastrointestinal systems are negative.       Objective    /80   Pulse 62   Temp 99.1  F (37.3  C) (Tympanic)   Ht 1.561 m (5' 1.46\")   Wt 54.4 kg (120 lb)   SpO2 99%   BMI 22.34 kg/m    Body mass index is 22.34 kg/m .  Physical Exam   GENERAL: healthy, alert and no distress  RESP: lungs clear to auscultation - no rales, rhonchi or wheezes  CV: regular rate and rhythm, normal S1 S2, no S3 or S4, no murmur, click or rub, no peripheral edema and peripheral pulses strong  ABDOMEN: soft, nontender, no hepatosplenomegaly, no masses and bowel sounds normal  MS: no gross musculoskeletal defects noted, no edema  PSYCH: mentation appears normal, affect normal/bright        Assessment & Plan     ASSESSMENT/PLAN:      ICD-10-CM    1. Abdominal pain, generalized  R10.84 Comprehensive metabolic panel (BMP + Alb, Alk Phos, ALT, AST, Total. Bili, TP)     Lipase     UA with Microscopic reflex to Culture     CBC with platelets and differential     US Abdomen Complete     CT Abdomen Pelvis w/o & w Contrast   2. Thyroid nodule  E04.1 T4, free     T3, total     TSH   3. Abnormal finding in urine  R82.90 Urine Culture Aerobic Bacterial   4. Benign essential microscopic hematuria  R31.1 CT Abdomen Pelvis w/o & w Contrast     Unknown cause of symptoms,   Discussed gallbladder, gastritis, dietary intolerance  Addendum will perform CT rather than ultrasound due to microscopic hematuria, see result note  Consider pelvic floor physical therapy in future for " urinary concerns (chronic) we discussed this.    Patient Instructions   We will you know about labwork  Ultrasound: Please contact [facility]  at [phone] to schedule appointment   Keep monitoring for triggers/causes  Consider trying OTC pepcid 2 times daily for a couple weeks to help calm down stomach  Next steps consider GI specialist consult    Return in about 2 weeks (around 11/18/2020) for if not improving or if worsening.    30 minutes was spent face to face with the patient today discussing history, diagnosis, and follow-up plan as noted above. Over 50% of the visit was spent in counseling and coordination of care. Total Visit Time: 30 minutes.   Paulina Zamora PA-C  [facility]

## 2020-11-06 LAB
BACTERIA SPEC CULT: NO GROWTH
Lab: NORMAL
SPECIMEN SOURCE: NORMAL

## 2020-11-10 ENCOUNTER — MYC MEDICAL ADVICE (OUTPATIENT)
Dept: FAMILY MEDICINE | Facility: CLINIC | Age: 60
End: 2020-11-10

## 2020-11-10 DIAGNOSIS — R31.29 MICROSCOPIC HEMATURIA: Primary | ICD-10-CM

## 2020-11-11 ENCOUNTER — COMMUNICATION - HEALTHEAST (OUTPATIENT)
Dept: CARDIOLOGY | Facility: CLINIC | Age: 60
End: 2020-11-11

## 2020-11-11 DIAGNOSIS — I10 ESSENTIAL HYPERTENSION: ICD-10-CM

## 2020-11-18 ENCOUNTER — MYC MEDICAL ADVICE (OUTPATIENT)
Dept: FAMILY MEDICINE | Facility: CLINIC | Age: 60
End: 2020-11-18

## 2020-11-18 DIAGNOSIS — R19.7 DIARRHEA, UNSPECIFIED TYPE: Primary | ICD-10-CM

## 2020-11-19 NOTE — TELEPHONE ENCOUNTER
Supplies for c diff specimen collection in bag at  with patient's name. Notified patient via mychart.  Jessenia Torrez RN

## 2020-11-19 NOTE — TELEPHONE ENCOUNTER
I'm not sure how to go about getting patient a kit - can lab package it up for her and leave it at the ?  Paulina Zamora PA-C

## 2020-11-22 ENCOUNTER — MYC MEDICAL ADVICE (OUTPATIENT)
Dept: FAMILY MEDICINE | Facility: CLINIC | Age: 60
End: 2020-11-22

## 2020-11-30 ENCOUNTER — MYC MEDICAL ADVICE (OUTPATIENT)
Dept: FAMILY MEDICINE | Facility: CLINIC | Age: 60
End: 2020-11-30

## 2020-12-01 ENCOUNTER — MYC MEDICAL ADVICE (OUTPATIENT)
Dept: FAMILY MEDICINE | Facility: CLINIC | Age: 60
End: 2020-12-01

## 2020-12-02 ENCOUNTER — E-VISIT (OUTPATIENT)
Dept: FAMILY MEDICINE | Facility: CLINIC | Age: 60
End: 2020-12-02
Payer: COMMERCIAL

## 2020-12-02 DIAGNOSIS — F41.9 ANXIETY: Primary | ICD-10-CM

## 2020-12-02 PROCEDURE — 99421 OL DIG E/M SVC 5-10 MIN: CPT | Performed by: PHYSICIAN ASSISTANT

## 2020-12-02 ASSESSMENT — ANXIETY QUESTIONNAIRES
3. WORRYING TOO MUCH ABOUT DIFFERENT THINGS: SEVERAL DAYS
7. FEELING AFRAID AS IF SOMETHING AWFUL MIGHT HAPPEN: SEVERAL DAYS
1. FEELING NERVOUS, ANXIOUS, OR ON EDGE: SEVERAL DAYS
GAD7 TOTAL SCORE: 7
GAD7 TOTAL SCORE: 7
6. BECOMING EASILY ANNOYED OR IRRITABLE: SEVERAL DAYS
7. FEELING AFRAID AS IF SOMETHING AWFUL MIGHT HAPPEN: SEVERAL DAYS
4. TROUBLE RELAXING: SEVERAL DAYS
2. NOT BEING ABLE TO STOP OR CONTROL WORRYING: SEVERAL DAYS
5. BEING SO RESTLESS THAT IT IS HARD TO SIT STILL: SEVERAL DAYS

## 2020-12-03 ASSESSMENT — ANXIETY QUESTIONNAIRES: GAD7 TOTAL SCORE: 7

## 2020-12-14 ENCOUNTER — HEALTH MAINTENANCE LETTER (OUTPATIENT)
Age: 60
End: 2020-12-14

## 2020-12-21 ENCOUNTER — TELEPHONE (OUTPATIENT)
Dept: FAMILY MEDICINE | Facility: CLINIC | Age: 60
End: 2020-12-21

## 2020-12-22 DIAGNOSIS — E03.9 ACQUIRED HYPOTHYROIDISM: Primary | ICD-10-CM

## 2020-12-22 LAB
T3FREE SERPL-MCNC: 2.2 PG/ML (ref 2.3–4.2)
T4 FREE SERPL-MCNC: 0.86 NG/DL (ref 0.76–1.46)
TSH SERPL DL<=0.005 MIU/L-ACNC: 4.38 MU/L (ref 0.4–4)

## 2020-12-22 PROCEDURE — 84481 FREE ASSAY (FT-3): CPT

## 2020-12-22 PROCEDURE — 84443 ASSAY THYROID STIM HORMONE: CPT

## 2020-12-22 PROCEDURE — 36415 COLL VENOUS BLD VENIPUNCTURE: CPT

## 2020-12-22 PROCEDURE — 84439 ASSAY OF FREE THYROXINE: CPT

## 2021-01-05 ENCOUNTER — OFFICE VISIT (OUTPATIENT)
Dept: DERMATOLOGY | Facility: CLINIC | Age: 61
End: 2021-01-05
Payer: COMMERCIAL

## 2021-01-05 VITALS — DIASTOLIC BLOOD PRESSURE: 86 MMHG | SYSTOLIC BLOOD PRESSURE: 137 MMHG | OXYGEN SATURATION: 97 % | HEART RATE: 105 BPM

## 2021-01-05 DIAGNOSIS — D48.5 NEOPLASM OF UNCERTAIN BEHAVIOR OF SKIN: Primary | ICD-10-CM

## 2021-01-05 PROCEDURE — 99207 PR DROP WITH A PROCEDURE: CPT | Performed by: PHYSICIAN ASSISTANT

## 2021-01-05 PROCEDURE — 88304 TISSUE EXAM BY PATHOLOGIST: CPT | Performed by: DERMATOLOGY

## 2021-01-05 PROCEDURE — 11421 EXC H-F-NK-SP B9+MARG 0.6-1: CPT | Performed by: PHYSICIAN ASSISTANT

## 2021-01-05 PROCEDURE — 13120 CMPLX RPR S/A/L 1.1-2.5 CM: CPT | Performed by: PHYSICIAN ASSISTANT

## 2021-01-05 NOTE — LETTER
1/5/2021         RE: Davida Mo  [address] [place] [address]        Dear Colleague,    Thank you for referring your patient, Davida Mo, to the [facility]. Please see a copy of my visit note below.    Davida Mo is an extremely pleasant 60 year old year old female patient here today for growth on scalp. She notes she tender to pick at spot. No pain or bleeding. Patient has no other skin complaints today.  Remainder of the HPI, Meds, PMH, Allergies, FH, and SH was reviewed in chart.    Past Medical History:   Diagnosis Date     Arthritis      Depression     on meds from 96 to 06. no symptoms have returned     NSTEMI (non-ST elevated myocardial infarction) (H) 2011,9/23/2014    see care everywhere     Prinzmetal angina (H)     first heart attack. prinzmetal angina diagnosis       Past Surgical History:   Procedure Laterality Date     TUBAL LIGATION          Family History   Problem Relation Age of Onset     Thyroid Disease Sister      Lung Cancer Sister 67        Lung Cancer, removal of left lower lung     Diabetes Mother      Hypertension Mother      Allergies Mother      Arthritis Mother      Eye Disorder Mother      Gastrointestinal Disease Mother         diverticulitis     Osteoporosis Mother         and spinal stenosis     Asthma Mother      Atrial fibrillation Mother      Diverticulitis Mother      Heart Disease Father      Alcohol/Drug Father      Irritable Bowel Syndrome Father         possibly     Cancer Sister      Neurologic Disorder Sister         brain tumor / cancer     Breast Cancer No family hx of        Social History     Socioeconomic History     Marital status:      Spouse name: Not on file     Number of children: Not on file     Years of education: Not on file     Highest education level: Not on file   Occupational History     Not on file   Social Needs     Financial resource strain: Not on file     Food insecurity     Worry: Not on file      Inability: Not on file     Transportation needs     Medical: Not on file     Non-medical: Not on file   Tobacco Use     Smoking status: Former Smoker     Years: 10.00     Types: Cigarettes     Start date: 9/10/1972     Quit date: 1991     Years since quittin.3     Smokeless tobacco: Never Used   Substance and Sexual Activity     Alcohol use: Yes     Comment: Occasionally     Drug use: No     Sexual activity: Yes     Partners: Male     Birth control/protection: None   Lifestyle     Physical activity     Days per week: Not on file     Minutes per session: Not on file     Stress: Not on file   Relationships     Social connections     Talks on phone: Not on file     Gets together: Not on file     Attends Mu-ism service: Not on file     Active member of club or organization: Not on file     Attends meetings of clubs or organizations: Not on file     Relationship status: Not on file     Intimate partner violence     Fear of current or ex partner: Not on file     Emotionally abused: Not on file     Physically abused: Not on file     Forced sexual activity: Not on file   Other Topics Concern     Parent/sibling w/ CABG, MI or angioplasty before 65F 55M? Not Asked   Social History Narrative     Not on file       Outpatient Encounter Medications as of 2021   Medication Sig Dispense Refill     aspirin 81 MG EC tablet Take 81 mg by mouth every evening  90 tablet 3     diltiazem 120 MG 24 hr CD capsule Take 1 capsule (120 mg) by mouth daily 30 capsule 0     estradiol POWD bio identical compounded cream 0.75MG/GM apply 0.5MG per vagina every other day 45 g 1     metoprolol (TOPROL-XL) 50 MG 24 hr tablet Take 50 mg by mouth daily        MISC NATURAL PRODUCTS PO holigos for IBS       NATURE-THROID 97.5 MG TABS        OVER-THE-COUNTER Apply 0.25 teaspoonful topically daily Progest Cream - Hormone balancing       OVER-THE-COUNTER Apply topically daily DHEA Cream       STATIN NOT PRESCRIBED, INTENTIONAL, 1 each At  Bedtime Statin not prescribed intentionally due to Allergy to statin - okay to stop by cardiology 0 each 0     Ubiquinol 100 MG CAPS Take 1 capsule by mouth daily       Vitamin D-Vitamin K (VITAMIN K2-VITAMIN D3 PO) 3 drops daily D3 = 1208, K = 25 mcg       dicyclomine (BENTYL) 10 MG capsule Take 1 capsule (10 mg) by mouth 4 times daily as needed (for cramps or in anticipation of a stressor that triggers abdominal pain) (Patient not taking: Reported on 1/5/2021) 40 capsule 0     No facility-administered encounter medications on file as of 1/5/2021.              Review Of Systems  Skin: As above  Eyes: negative  Ears/Nose/Throat: negative  Respiratory: No shortness of breath, dyspnea on exertion, cough      O:   NAD, WDWN, Alert & Oriented, Mood & Affect wnl, Vitals stable   Here today alone   /86   Pulse 105   SpO2 97%    General appearance normal   Vitals stable   Alert, oriented and in no acute distress     1.0 cm skin colored subcutaneous nodule       Eyes: Conjunctivae/lids:Normal     ENT: Lips: normal    MSK:Normal    Pulm: Breathing Normal    Neuro/Psych: Orientation:Alert and Orientedx3 ; Mood/Affect:normal   A/P:  1.  Pilar cyst on left occipital scalp  EXCISION OF CYST AND COMPLEX: After thorough discussion of PGACAC, consent obtained, anesthesia and prep, the margins of the cyst were identified and an elliptical incision was made encompassing the cyst. The incisions were made through the skin and down to and including the subcutaneous tissue. The cyst was removed en bloc and submitted for permanent pathologic review. The wound edges were widely undermined until adequate tissue mobility was obtained. hemostasis was achieved. The wound edges were then closed in a layered fashion, being careful not to leave any dead space. Postoperative length was 2.0 cm.   EBL minimal; complications none; wound care routine. The patient was discharged in good condition and will return in one week for wound  evaluation.        Again, thank you for allowing me to participate in the care of your patient.        Sincerely,        Maryann June PA-C

## 2021-01-05 NOTE — PROGRESS NOTES
Davida Mo is an extremely pleasant 60 year old year old female patient here today for growth on scalp. She notes she tender to pick at spot. No pain or bleeding. Patient has no other skin complaints today.  Remainder of the HPI, Meds, PMH, Allergies, FH, and SH was reviewed in chart.    Past Medical History:   Diagnosis Date     Arthritis      Depression     on meds from 96 to 06. no symptoms have returned     NSTEMI (non-ST elevated myocardial infarction) (H) 2011,9/23/2014    see care everywhere     Prinzmetal angina (H)     first heart attack. prinzmetal angina diagnosis       Past Surgical History:   Procedure Laterality Date     TUBAL LIGATION          Family History   Problem Relation Age of Onset     Thyroid Disease Sister      Lung Cancer Sister 67        Lung Cancer, removal of left lower lung     Diabetes Mother      Hypertension Mother      Allergies Mother      Arthritis Mother      Eye Disorder Mother      Gastrointestinal Disease Mother         diverticulitis     Osteoporosis Mother         and spinal stenosis     Asthma Mother      Atrial fibrillation Mother      Diverticulitis Mother      Heart Disease Father      Alcohol/Drug Father      Irritable Bowel Syndrome Father         possibly     Cancer Sister      Neurologic Disorder Sister         brain tumor / cancer     Breast Cancer No family hx of        Social History     Socioeconomic History     Marital status:      Spouse name: Not on file     Number of children: Not on file     Years of education: Not on file     Highest education level: Not on file   Occupational History     Not on file   Social Needs     Financial resource strain: Not on file     Food insecurity     Worry: Not on file     Inability: Not on file     Transportation needs     Medical: Not on file     Non-medical: Not on file   Tobacco Use     Smoking status: Former Smoker     Years: 10.00     Types: Cigarettes     Start date: 9/10/1972     Quit date: 9/23/1991      Years since quittin.3     Smokeless tobacco: Never Used   Substance and Sexual Activity     Alcohol use: Yes     Comment: Occasionally     Drug use: No     Sexual activity: Yes     Partners: Male     Birth control/protection: None   Lifestyle     Physical activity     Days per week: Not on file     Minutes per session: Not on file     Stress: Not on file   Relationships     Social connections     Talks on phone: Not on file     Gets together: Not on file     Attends Evangelical service: Not on file     Active member of club or organization: Not on file     Attends meetings of clubs or organizations: Not on file     Relationship status: Not on file     Intimate partner violence     Fear of current or ex partner: Not on file     Emotionally abused: Not on file     Physically abused: Not on file     Forced sexual activity: Not on file   Other Topics Concern     Parent/sibling w/ CABG, MI or angioplasty before 65F 55M? Not Asked   Social History Narrative     Not on file       Outpatient Encounter Medications as of 2021   Medication Sig Dispense Refill     aspirin 81 MG EC tablet Take 81 mg by mouth every evening  90 tablet 3     diltiazem 120 MG 24 hr CD capsule Take 1 capsule (120 mg) by mouth daily 30 capsule 0     estradiol POWD bio identical compounded cream 0.75MG/GM apply 0.5MG per vagina every other day 45 g 1     metoprolol (TOPROL-XL) 50 MG 24 hr tablet Take 50 mg by mouth daily        MISC NATURAL PRODUCTS PO holigos for IBS       NATURE-THROID 97.5 MG TABS        OVER-THE-COUNTER Apply 0.25 teaspoonful topically daily Progest Cream - Hormone balancing       OVER-THE-COUNTER Apply topically daily DHEA Cream       STATIN NOT PRESCRIBED, INTENTIONAL, 1 each At Bedtime Statin not prescribed intentionally due to Allergy to statin - okay to stop by cardiology 0 each 0     Ubiquinol 100 MG CAPS Take 1 capsule by mouth daily       Vitamin D-Vitamin K (VITAMIN K2-VITAMIN D3 PO) 3 drops daily D3 = 1208, K = 25  mcg       dicyclomine (BENTYL) 10 MG capsule Take 1 capsule (10 mg) by mouth 4 times daily as needed (for cramps or in anticipation of a stressor that triggers abdominal pain) (Patient not taking: Reported on 1/5/2021) 40 capsule 0     No facility-administered encounter medications on file as of 1/5/2021.              Review Of Systems  Skin: As above  Eyes: negative  Ears/Nose/Throat: negative  Respiratory: No shortness of breath, dyspnea on exertion, cough      O:   NAD, WDWN, Alert & Oriented, Mood & Affect wnl, Vitals stable   Here today alone   /86   Pulse 105   SpO2 97%    General appearance normal   Vitals stable   Alert, oriented and in no acute distress     1.0 cm skin colored subcutaneous nodule       Eyes: Conjunctivae/lids:Normal     ENT: Lips: normal    MSK:Normal    Pulm: Breathing Normal    Neuro/Psych: Orientation:Alert and Orientedx3 ; Mood/Affect:normal   A/P:  1.  Pilar cyst on left occipital scalp  EXCISION OF CYST AND COMPLEX: After thorough discussion of PGACAC, consent obtained, anesthesia and prep, the margins of the cyst were identified and an elliptical incision was made encompassing the cyst. The incisions were made through the skin and down to and including the subcutaneous tissue. The cyst was removed en bloc and submitted for permanent pathologic review. The wound edges were widely undermined until adequate tissue mobility was obtained. hemostasis was achieved. The wound edges were then closed in a layered fashion, being careful not to leave any dead space. Postoperative length was 2.0 cm.   EBL minimal; complications none; wound care routine. The patient was discharged in good condition and will return in one week for wound evaluation.

## 2021-01-05 NOTE — PATIENT INSTRUCTIONS
[facility]   [phone]    [facility] [phone]      Stapled Wound Care    scalp      ? No strenuous activity for 48 hours. Resume moderate activity in 48 hours. No heavy exercising until you are seen for follow up in one week.    ? Take Tylenol as needed for discomfort.                                        ? Do not drink alcoholic beverages for 48 hours.    ? Keep the pressure bandage in place for 24 hours. If the bandage becomes blood tinged or loose, reinforce it with gauze and tape.   ? Remove bandage in 24 hours and begin wound care as follows:     1. Rinse the stapled area with tap water. (shower / bathe / shampoo normally)  2. Dry wound with Q tip or gauze pad  3. Apply Polysporin or Bacitracin Ointment to the staples.    Do NOT use Neosporin Ointment *  4. Cover the wound with a bandaid or nonstick gauze pad and paper tape.  5. Repeat wound care once a day until staples are removed.    In case of emergency call: [phone]

## 2021-01-08 ENCOUNTER — TELEPHONE (OUTPATIENT)
Dept: FAMILY MEDICINE | Facility: CLINIC | Age: 61
End: 2021-01-08

## 2021-01-11 LAB — COPATH REPORT: NORMAL

## 2021-01-12 ENCOUNTER — E-VISIT (OUTPATIENT)
Dept: FAMILY MEDICINE | Facility: CLINIC | Age: 61
End: 2021-01-12

## 2021-01-12 DIAGNOSIS — F43.10 PTSD (POST-TRAUMATIC STRESS DISORDER): Primary | ICD-10-CM

## 2021-01-12 PROCEDURE — 99207 PR NO BILLABLE SERVICE THIS VISIT: CPT | Performed by: FAMILY MEDICINE

## 2021-01-12 ASSESSMENT — ANXIETY QUESTIONNAIRES
7. FEELING AFRAID AS IF SOMETHING AWFUL MIGHT HAPPEN: SEVERAL DAYS
5. BEING SO RESTLESS THAT IT IS HARD TO SIT STILL: SEVERAL DAYS
7. FEELING AFRAID AS IF SOMETHING AWFUL MIGHT HAPPEN: SEVERAL DAYS
4. TROUBLE RELAXING: SEVERAL DAYS
1. FEELING NERVOUS, ANXIOUS, OR ON EDGE: SEVERAL DAYS
3. WORRYING TOO MUCH ABOUT DIFFERENT THINGS: SEVERAL DAYS
GAD7 TOTAL SCORE: 7
2. NOT BEING ABLE TO STOP OR CONTROL WORRYING: SEVERAL DAYS
GAD7 TOTAL SCORE: 7
6. BECOMING EASILY ANNOYED OR IRRITABLE: SEVERAL DAYS

## 2021-01-13 ENCOUNTER — ALLIED HEALTH/NURSE VISIT (OUTPATIENT)
Dept: DERMATOLOGY | Facility: CLINIC | Age: 61
End: 2021-01-13
Payer: COMMERCIAL

## 2021-01-13 DIAGNOSIS — Z48.01 ENCOUNTER FOR CHANGE OR REMOVAL OF SURGICAL WOUND DRESSING: Primary | ICD-10-CM

## 2021-01-13 PROCEDURE — 99207 PR NO CHARGE NURSE ONLY: CPT

## 2021-01-13 ASSESSMENT — ANXIETY QUESTIONNAIRES: GAD7 TOTAL SCORE: 7

## 2021-01-13 NOTE — PROGRESS NOTES
Pt returned to clinic for post surgery 1 week follow up staple removal. Pt has no complaints, denies pain. #4 staples removed from left scalp, area cleansed with normal saline. Site is healing and wound edges approximating well.     Advised to watch for signs/sx of infection; spreading redness, drainage, odor, fever. Call or report promptly to clinic. Pt given written instructions and informed to rtc as needed. Patient verbalized understanding.

## 2021-01-18 ENCOUNTER — VIRTUAL VISIT (OUTPATIENT)
Dept: FAMILY MEDICINE | Facility: CLINIC | Age: 61
End: 2021-01-18
Payer: COMMERCIAL

## 2021-01-18 DIAGNOSIS — F43.10 PTSD (POST-TRAUMATIC STRESS DISORDER): Primary | ICD-10-CM

## 2021-01-18 DIAGNOSIS — E04.1 THYROID NODULE: ICD-10-CM

## 2021-01-18 DIAGNOSIS — F41.9 ANXIETY: ICD-10-CM

## 2021-01-18 DIAGNOSIS — K58.0 IRRITABLE BOWEL SYNDROME WITH DIARRHEA: ICD-10-CM

## 2021-01-18 PROCEDURE — 99214 OFFICE O/P EST MOD 30 MIN: CPT | Mod: 95 | Performed by: FAMILY MEDICINE

## 2021-01-18 RX ORDER — TRYPTOPHAN 500 MG
500 TABLET ORAL 2 TIMES DAILY
COMMUNITY

## 2021-01-18 RX ORDER — LORAZEPAM 0.5 MG/1
TABLET ORAL
Qty: 20 TABLET | Refills: 2 | Status: SHIPPED | OUTPATIENT
Start: 2021-01-18 | End: 2021-10-21

## 2021-01-18 NOTE — PROGRESS NOTES
Davida is a 60 year old who is being evaluated via a billable telephone visit.      What phone number would you like to be contacted at? [phone]  How would you like to obtain your AVS? MyChart  Assessment & Plan     Anxiety  Restart   - LORazepam (ATIVAN) 0.5 MG tablet; Take 1 at onset of panic attack. May repeat in 8 hours if needed. Max 20 tabs per month    PTSD (post-traumatic stress disorder)  Strongly recommended talk therapy and emdr   - LORazepam (ATIVAN) 0.5 MG tablet; Take 1 at onset of panic attack. May repeat in 8 hours if needed. Max 20 tabs per month    Irritable bowel syndrome with diarrhea  persists  Thyroid nodule            Review of the result(s) of each unique test - thyroid,stool,along with prevous treatments and results      Diagnosis or treatment significantly limited by social determinants of health - she had a n incident 11/14/19when she had siarrhea when she was out and she could not get to a bathroom.she has had diarrhea for a long time.   She had not had an incident in public before this . She now is afraid to leave the house  She has not been in therapy before. We discussed EMDR for her ptsd. It is very likely to help her   . She has used lorazepam in the past but not for several years   This did help her she does not want to bedome depedant on this   Has been working with a natural doctor for her thyroid.   Had adjustment recently  She had previously been on several medications dfor her heart. Had dissection of coronary vessel in 2012. She took medications for a while then started changing things in her life and got off medications   In the past she took a medication from chiro called travacol and while this helped her anxiety , it gave her diarrhea.   She is now taking some supplements that seem to help her.  She would like to have something for anxiety as none of these things are working right now she is afraid to leave her house and she has not been leaving the house        45  minutes spent on the date of the encounter doing chart review, review of test results, patient visit and documentation                CONSULTATION/REFERRAL to mental health     Return in about 4 weeks (around 2/15/2021) for med check.   Eliana Quinteros M.D.      Eliana Quinteros MD  [facility]    Subjective   She has changed her diet last 2 weeks   Davida is a 60 year old who presents to clinic today for the following health issues     HPI         Anxiety Follow-Up    How are you doing with your anxiety since your last visit? Worsened IBS attacks and anxiety attacks and turned into PTSD    Are you having other symptoms that might be associated with anxiety? Yes:  see above    Have you had a significant life event? Health Concerns     Are you feeling depressed? No    Do you have any concerns with your use of alcohol or other drugs? No    Social History     Tobacco Use     Smoking status: Former Smoker     Years: 10.00     Types: Cigarettes     Start date: 9/10/1972     Quit date: 1991     Years since quittin.3     Smokeless tobacco: Never Used   Substance Use Topics     Alcohol use: Yes     Comment: Occasionally     Drug use: No     PADMA-7 SCORE 3/9/2020 2020 2021   Total Score - - -   Total Score - 7 (mild anxiety) 7 (mild anxiety)   Total Score 6 7 7     PHQ 11/3/2015 3/9/2020   PHQ-9 Total Score 2 4   Q9: Thoughts of better off dead/self-harm past 2 weeks Not at all Not at all           How many servings of fruits and vegetables do you eat daily?  2-3 she only eats 2 meals per day.    On average, how many sweetened beverages do you drink each day (Examples: soda, juice, sweet tea, etc.  Do NOT count diet or artificially sweetened beverages)?   0    How many days per week do you exercise enough to make your heart beat faster? 3 or less    How many minutes a day do you exercise enough to make your heart beat faster? 9 or less    How many days per week do you miss taking your  medication? 0    As above I have spent some time today. 1/18/21 about 15 minutes looking through her chart , reviewing previous treatments . She has been encouraged to go the therapy . She has had lorazepam in the past . I checked  last week and there were no controlled medications prescribed she had taken a supplement called travacor and when she was seenin follow up her anxiety had resolved. Then 5htp then had taken too much and it caused more diarrhea restarted her 5htp and she had diarrhea she has been working on an elimination diet.  Review of Systems   Constitutional, HEENT, cardiovascular, pulmonary, GI, , musculoskeletal, neuro, skin, endocrine and psych systems are negative, except as otherwise noted.      Objective           Vitals:  No vitals were obtained today due to virtual visit.    Physical Exam   alert and no distress  PSYCH: Alert and oriented times 3; coherent speech, normal   rate and volume, able to articulate logical thoughts, able   to abstract reason, no tangential thoughts, no hallucinations   or delusions  Her affect is normal  RESP: No cough, no audible wheezing, able to talk in full sentences  Remainder of exam unable to be completed due to telephone visits    none          Eliana Quinteros M.D.    Phone call duration: 35 minutes

## 2021-01-26 DIAGNOSIS — N95.2 ATROPHIC VAGINITIS: ICD-10-CM

## 2021-01-26 NOTE — CONFIDENTIAL NOTE
Routing refill request to provider for review/approval because:  Would like Provider to decide.  Thank you.  Pardeep Flores RN

## 2021-01-27 RX ORDER — ESTRADIOL 100 %
POWDER (GRAM) MISCELLANEOUS
Qty: 45 G | Refills: 1 | Status: SHIPPED | OUTPATIENT
Start: 2021-01-27 | End: 2021-10-11

## 2021-02-04 ENCOUNTER — COMMUNICATION - HEALTHEAST (OUTPATIENT)
Dept: CARDIOLOGY | Facility: CLINIC | Age: 61
End: 2021-02-04

## 2021-02-04 DIAGNOSIS — I25.10 CAD (CORONARY ARTERY DISEASE): ICD-10-CM

## 2021-02-12 DIAGNOSIS — E03.9 MYXEDEMA HEART DISEASE: Primary | ICD-10-CM

## 2021-02-12 DIAGNOSIS — I51.9 MYXEDEMA HEART DISEASE: Primary | ICD-10-CM

## 2021-02-12 DIAGNOSIS — F41.9 ANXIETY: ICD-10-CM

## 2021-02-12 DIAGNOSIS — F41.0 PANIC DISORDER WITHOUT AGORAPHOBIA: ICD-10-CM

## 2021-02-12 LAB
T3FREE SERPL-MCNC: 2.8 PG/ML (ref 2.3–4.2)
T4 FREE SERPL-MCNC: 0.71 NG/DL (ref 0.76–1.46)
TSH SERPL DL<=0.005 MIU/L-ACNC: 2.77 MU/L (ref 0.4–4)

## 2021-02-12 PROCEDURE — 84481 FREE ASSAY (FT-3): CPT | Performed by: FAMILY MEDICINE

## 2021-02-12 PROCEDURE — 36415 COLL VENOUS BLD VENIPUNCTURE: CPT | Performed by: FAMILY MEDICINE

## 2021-02-12 PROCEDURE — 84439 ASSAY OF FREE THYROXINE: CPT | Performed by: FAMILY MEDICINE

## 2021-02-12 PROCEDURE — 84443 ASSAY THYROID STIM HORMONE: CPT | Performed by: FAMILY MEDICINE

## 2021-04-02 DIAGNOSIS — F41.0 PANIC DISORDER WITHOUT AGORAPHOBIA: Primary | ICD-10-CM

## 2021-04-02 DIAGNOSIS — F41.9 ANXIETY DISORDER: ICD-10-CM

## 2021-04-02 DIAGNOSIS — E03.9 HYPOTHYROID: ICD-10-CM

## 2021-04-02 LAB
T3FREE SERPL-MCNC: 2.8 PG/ML (ref 2.3–4.2)
T4 FREE SERPL-MCNC: 0.83 NG/DL (ref 0.76–1.46)
TSH SERPL DL<=0.005 MIU/L-ACNC: 1.59 MU/L (ref 0.4–4)

## 2021-04-02 PROCEDURE — 84439 ASSAY OF FREE THYROXINE: CPT | Performed by: FAMILY MEDICINE

## 2021-04-02 PROCEDURE — 84481 FREE ASSAY (FT-3): CPT | Performed by: FAMILY MEDICINE

## 2021-04-02 PROCEDURE — 36415 COLL VENOUS BLD VENIPUNCTURE: CPT | Performed by: FAMILY MEDICINE

## 2021-04-02 PROCEDURE — 84443 ASSAY THYROID STIM HORMONE: CPT | Performed by: FAMILY MEDICINE

## 2021-05-25 ENCOUNTER — RECORDS - HEALTHEAST (OUTPATIENT)
Dept: ADMINISTRATIVE | Facility: CLINIC | Age: 61
End: 2021-05-25

## 2021-05-28 ENCOUNTER — RECORDS - HEALTHEAST (OUTPATIENT)
Dept: ADMINISTRATIVE | Facility: CLINIC | Age: 61
End: 2021-05-28

## 2021-05-30 VITALS — BODY MASS INDEX: 21.97 KG/M2 | WEIGHT: 124 LBS | HEIGHT: 63 IN

## 2021-06-01 VITALS — WEIGHT: 125 LBS | HEIGHT: 63 IN | BODY MASS INDEX: 22.15 KG/M2

## 2021-06-02 ENCOUNTER — RECORDS - HEALTHEAST (OUTPATIENT)
Dept: ADMINISTRATIVE | Facility: CLINIC | Age: 61
End: 2021-06-02

## 2021-06-03 ENCOUNTER — RECORDS - HEALTHEAST (OUTPATIENT)
Dept: ADMINISTRATIVE | Facility: CLINIC | Age: 61
End: 2021-06-03

## 2021-06-04 VITALS
RESPIRATION RATE: 16 BRPM | WEIGHT: 127 LBS | SYSTOLIC BLOOD PRESSURE: 136 MMHG | HEART RATE: 68 BPM | HEIGHT: 63 IN | BODY MASS INDEX: 22.5 KG/M2 | DIASTOLIC BLOOD PRESSURE: 84 MMHG

## 2021-06-25 NOTE — PROGRESS NOTES
Progress Notes by Eugene Campbell MD at 3/10/2017  2:30 PM     Author: Eugene Campbell MD Service: -- Author Type: Physician    Filed: 3/10/2017  3:05 PM Encounter Date: 3/10/2017 Status: Signed    : Eugene Campbell MD (Physician)           Click to link to [facility] HEART CARE NOTE    Thank you, Dr. Zamora, for asking us to see Davida BARRAGAN at the [facility].      Assessment/Recommendations   Patient with known history of wall motion normality is consistent with myocardial injury and a suspicion of coronary dissection in the obtuse system.  She has been stable of late but a little bit fatigued.  Her blood pressure is borderline today but I have asked her to cut her metoprolol in half and take 2 blood pressures each week for the next couple weeks and let us know where she is.  If her blood pressure creeps up she will go back to her previous dose of metoprolol and I have asked her to give us a call.  We could also substitute a different medication.         History of Present Illness    Ms. Davida BARRAGAN is a 56 y.o. female with known unusual coronary disease.  She had documentation of a wall motion normality with no evidence of coronary artery disease and on repeat angiography had a suspicion of a dissection.  She has been feeling well this past year and has not had any chest discomfort.  She has had some foot surgery and some difficulties with panic attacks but that seems to be better with some natural medications that she is taking.  Her cholesterols also come down with the addition of some natural thyroid replacement that she is taking.  She has not had any syncope, near syncope, chest discomfort, orthopnea, paroxysmal nocturnal dyspnea, peripheral edema. .  A little bit fatigued and wonders if she can come off of her metoprolol.         Physical Examination Review of Systems   Vitals:    03/10/17 1440   BP: 140/86   Pulse: 72   Resp: 16     Body  mass index is 21.97 kg/(m^2).  Wt Readings from Last 3 Encounters:   03/10/17 124 lb (56.2 kg)   12/18/15 120 lb (54.4 kg)   09/24/14 114 lb 1.6 oz (51.8 kg)     General Appearance:   Alert, cooperative and in no acute distress.   ENT/Mouth: Oral mucuos membranes pink and moist .      EYES:  No scleral icterus. No Xanthelasma.    Neck: JVP normal. No Hepatojugular reflux. Thyroid not visualized   Chest/Lungs:   Lungs are clear to auscultation, equal chest wall expansion    Cardiovascular:   S1, S2 without murmur ,clicks or rubs. Brachial, radial and posterior tibial pulses are intact and symetric. No carotid bruits noted   Abdomen:  Nontender. BS+. No bruits.      Extremities: No cyanosis, clubbing or edema   Skin: no xanthelasma, warm.    Psych: Appropriate affect.   Neurologic: normal gait, normal  bilateral, no tremors        General: WNL  Eyes: WNL  Ears/Nose/Throat: WNL  Lungs: WNL  Heart: WNL  Stomach: WNL  Bladder: WNL  Muscle/Joints: WNL  Skin: WNL  Nervous System: WNL  Mental Health: WNL           Medical History  Surgical History Family History Social History   Past Medical History:   Diagnosis Date   ? Anxiety    ? Atrophic vaginitis    ? Heart attack 4/19/2011   ? HTN (hypertension)    ? Prinzmetal angina     Past Surgical History:   Procedure Laterality Date   ? TUBAL LIGATION      Family History   Problem Relation Age of Onset   ? Heart failure Father 71   ? Diabetes type II Mother 70   ? Atrial fibrillation Mother    ? Cancer Sister    ? Parkinsonism Brother     Social History     Social History   ? Marital status:      Spouse name: N/A   ? Number of children: N/A   ? Years of education: N/A     Occupational History   ? Not on file.     Social History Main Topics   ? Smoking status: Former Smoker     Types: Cigarettes     Quit date: 9/23/1991   ? Smokeless tobacco: Never Used   ? Alcohol use 1.0 oz/week     2 drink(s) per week      Comment: occasional etoh use    ? Drug use: No   ? Sexual  activity: Not on file     Other Topics Concern   ? Not on file     Social History Narrative    Lives in a house, just got insurance          Medications  Allergies   Current Outpatient Prescriptions   Medication Sig Dispense Refill   ? aspirin 81 MG EC tablet Take 81 mg by mouth daily.     ? C/SOURCHERRY/CELERY/GRAPE SEED (TART CHERRY ORAL) Take 1 capsule by mouth daily.     ? coQ10, ubiquinol, 200 mg cap Take 200 mg by mouth 2 (two) times a day.     ? diltiazem (CARDIZEM CD) 120 MG 24 hr capsule Take 1 capsule (120 mg total) by mouth daily. 90 capsule 1   ? ESTRADIOL VAGL Insert 1 application into the vagina every other day. bioidentical cream 0.75mg/GM apply 0.5mg per vagina lizbet other day     ? LACTOBACILLUS ACIDOPHILUS (PROBIOTIC ORAL) Take by mouth daily.     ? LORazepam (ATIVAN) 0.5 MG tablet Take 1 tablet by mouth as needed.     ? metoprolol succinate (TOPROL-XL) 50 MG 24 hr tablet Take 1 tablet (50 mg total) by mouth daily. 90 tablet 3   ? nitroglycerin (NITROSTAT) 0.4 MG SL tablet Place 0.4 mg under the tongue every 5 (five) minutes as needed for chest pain.     ? NON FORMULARY Take 3 tablets by mouth daily. Supplement name Bone Strength     ? NON FORMULARY Take 1 tablet by mouth daily. Supplement name Trace Minerals with B12     ? NON FORMULARY Take 1 tablet by mouth daily. Supplement - Lung, Bronchial, & Sinus Health     ? thyroid, pork, (NATURE-THROID) 16.25 mg Tab Take 16.25 mg by mouth 2 (two) times a day.       No current facility-administered medications for this visit.       Allergies   Allergen Reactions   ? Remeron [Mirtazapine] Rash         Lab Results    Chemistry/lipid CBC Cardiac Enzymes/BNP/TSH/INR   Lab Results   Component Value Date    CHOL 185 09/24/2014    HDL 57 09/24/2014    LDLCALC 111 09/24/2014    TRIG 87 09/24/2014    CREATININE 0.81 09/24/2014    BUN 17 09/24/2014    K 3.8 09/24/2014     09/24/2014     09/24/2014    CO2 27 09/24/2014    Lab Results   Component Value  Date    WBC 4.9 09/24/2014    HGB 14.2 09/24/2014    HCT 43.0 09/24/2014    MCV 93 09/24/2014     09/24/2014    Lab Results   Component Value Date    TROPONINI 0.11 09/24/2014    INR 0.94 09/05/2011

## 2021-06-26 NOTE — PROGRESS NOTES
Progress Notes by Eugene Campbell MD at 5/10/2018  1:30 PM     Author: Eugene Campbell MD Service: -- Author Type: Physician    Filed: 5/10/2018  2:17 PM Encounter Date: 5/10/2018 Status: Signed    : Eugene Campbell MD (Physician)           Click to link to [facility] HEART CARE NOTE    Thank you, Dr. Zamora, for asking us to see Davida Mo at the [facility].      Assessment/Recommendations   Patient with unusual circumstances and suspicion of coronary artery dissection, nearly 4 years ago.  She has been stable this past year without symptoms.  She does not exercise on a regular basis and I have encouraged her to walk for at least 30 minutes 5 times each week.  She will take that under advisement.    I am not changing of her medications today.  I have asked her to see us back in 1-1/2 years, but of course would be happy to see her sooner if questions or problems arise.    Thank you for allowing us to participate in her care.         History of Present Illness    Ms. Davida Mo is a 57 y.o. female with a history of a regional wall motion normalities and a suspicion of coronary artery dissection in obtuse marginal vessel.  This occurred nearly 4 years ago and over the last year she has had no symptoms.  She walks sporadically and does not have chest discomfort or unusual shortness of breath with activity.  She denies orthopnea, paroxysmal nocturnal dyspnea, peripheral edema, syncope or near syncopal episodes.  After taking thyroid medications her LDL cholesterol dropped to below 100.             Physical Examination Review of Systems   Vitals:    05/10/18 1332   BP: 110/62   Pulse: 63   Resp: 12     Body mass index is 22.5 kg/(m^2).  Wt Readings from Last 3 Encounters:   05/10/18 125 lb (56.7 kg)   03/10/17 124 lb (56.2 kg)   12/18/15 120 lb (54.4 kg)     General Appearance:   Alert, cooperative and in no acute distress.   ENT/Mouth: Oral mucuos  membranes pink and moist .      EYES:  No scleral icterus. No Xanthelasma.    Neck: JVP normal. No Hepatojugular reflux. Thyroid not visualizedly   Chest/Lungs:   Lungs are clear to auscultation, equal chest wall expansion    Cardiovascular:   S1, S2 without murmur ,clicks or rubs. Brachial, radial and posterior tibial pulses are intact and symetric. No carotid bruits noted   Abdomen:  Nontender. BS+.      Extremities: No cyanosis, clubbing or edema   Skin: no xanthelasma, warm.    Psych: Appropriate affect.   Neurologic: normal gait, normal  bilateral, no tremors        General: WNL  Eyes: WNL  Ears/Nose/Throat: WNL  Lungs: WNL  Heart: WNL  Stomach: WNL  Bladder: WNL  Muscle/Joints: WNL  Skin: WNL  Nervous System: WNL  Mental Health: WNL     Blood: WNL     Medical History  Surgical History Family History Social History   Past Medical History:   Diagnosis Date   ? Anxiety    ? Atrophic vaginitis    ? Dissection of coronary artery 5/10/2018   ? Heart attack 4/19/2011   ? HTN (hypertension)    ? Prinzmetal angina     Past Surgical History:   Procedure Laterality Date   ? TUBAL LIGATION      Family History   Problem Relation Age of Onset   ? Heart failure Father 71   ? Diabetes type II Mother 70   ? Atrial fibrillation Mother    ? Cancer Sister    ? Parkinsonism Brother     Social History     Social History   ? Marital status:      Spouse name: N/A   ? Number of children: N/A   ? Years of education: N/A     Occupational History   ? Not on file.     Social History Main Topics   ? Smoking status: Former Smoker     Types: Cigarettes     Quit date: 9/23/1991   ? Smokeless tobacco: Never Used   ? Alcohol use 1.0 oz/week     2 drink(s) per week      Comment: occasional etoh use    ? Drug use: No   ? Sexual activity: Not on file     Other Topics Concern   ? Not on file     Social History Narrative    Lives in a house, just got insurance          Medications  Allergies   Current Outpatient Prescriptions    Medication Sig Dispense Refill   ? aspirin 81 MG EC tablet Take 81 mg by mouth daily.     ? C/SOURCHERRY/CELERY/GRAPE SEED (TART CHERRY ORAL) Take 1 capsule by mouth daily.     ? cholecalciferol, vitamin D3, 1,000 unit tablet Take 1,000 Units by mouth daily.     ? coQ10, ubiquinol, 200 mg cap Take 200 mg by mouth 2 (two) times a day.     ? diltiazem (CARDIZEM CD) 120 MG 24 hr capsule Take 1 capsule (120 mg total) by mouth daily. 90 capsule 1   ? ESTRADIOL VAGL Insert 1 application into the vagina every other day. bioidentical cream 0.75mg/GM apply 0.5mg per vagina lizbet other day     ? LACTOBACILLUS ACIDOPHILUS (PROBIOTIC ORAL) Take by mouth daily.     ? metoprolol succinate (TOPROL-XL) 50 MG 24 hr tablet TAKE 1 TABLET DAILY 90 tablet 1   ? NON FORMULARY Take 3 tablets by mouth daily. Supplement name Bone Strength     ? NON FORMULARY Take 1 tablet by mouth daily. Supplement - Lung, Bronchial, & Sinus Health     ? omega-3/dha/epa/fish oil (FISH OIL-OMEGA-3 FATTY ACIDS) 300-1,000 mg capsule Take 2 g by mouth daily.     ? thyroid, pork, (NATURE-THROID) 16.25 mg Tab Take 16.25 mg by mouth 2 (two) times a day.     ? vitamin A 46810 UNIT capsule Take 10,000 Units by mouth daily.     ? LORazepam (ATIVAN) 0.5 MG tablet Take 1 tablet by mouth as needed.     ? nitroglycerin (NITROSTAT) 0.4 MG SL tablet Place 0.4 mg under the tongue every 5 (five) minutes as needed for chest pain.     ? NON FORMULARY Take 1 tablet by mouth daily. Supplement name Trace Minerals with B12       No current facility-administered medications for this visit.       Allergies   Allergen Reactions   ? Remeron [Mirtazapine] Rash         Lab Results    Chemistry/lipid CBC Cardiac Enzymes/BNP/TSH/INR   Lab Results   Component Value Date    CHOL 185 09/24/2014    HDL 57 09/24/2014    LDLCALC 111 09/24/2014    TRIG 87 09/24/2014    CREATININE 0.81 09/24/2014    BUN 17 09/24/2014    K 3.8 09/24/2014     09/24/2014     09/24/2014    CO2 27  09/24/2014    Lab Results   Component Value Date    WBC 4.9 09/24/2014    HGB 14.2 09/24/2014    HCT 43.0 09/24/2014    MCV 93 09/24/2014     09/24/2014    Lab Results   Component Value Date    TROPONINI 0.11 09/24/2014    INR 0.94 09/05/2011

## 2021-06-28 NOTE — PROGRESS NOTES
Progress Notes by Eugene Campbell MD at 12/10/2019 11:30 AM     Author: Eugene Campbell MD Service: -- Author Type: Physician    Filed: 12/10/2019 12:14 PM Encounter Date: 12/10/2019 Status: Signed    : Eugene Campbell MD (Physician)               Assessment/Recommendations   Patient with suspected coronary artery dissection now almost 5 years ago who is stable at this time.  Her blood pressure is reasonable, she takes an antiplatelet agent and her LDL cholesterol is reasonable.  I have not made any changes in her medication.  I have encouraged her to maintain an active lifestyle and even exercise 30 minutes, 5 times a week on a regular basis.  I have asked her to call if she develops any new symptomatology but we will plan on seeing her back in 2 years.    Thank you for allowing us to participate in her care.       History of Present Illness/Subjective    Ms. Davida Mo is a 57 y.o. female with a history of a regional wall motion normalities and a suspicion of coronary artery dissection in obtuse marginal vessel.  This occurred nearly 5 years ago and over the last year she has had no symptoms.  She walks sporadically and does not have chest discomfort or unusual shortness of breath with activity.  She has not had orthopnea, paroxysmal nocturnal dyspnea, peripheral edema, syncope or near syncopal episodes.  She does not workout at the gym but is very active around the house and shovel snow and is done that twice as her 's in [place].  She has no symptoms with this.  LDL cholesterol earlier this year was good.           Physical Examination Review of Systems   Vitals:    12/10/19 1138   BP: 136/84   Pulse: 68   Resp: 16     Body mass index is 22.86 kg/m .  Wt Readings from Last 3 Encounters:   12/10/19 127 lb (57.6 kg)   05/10/18 125 lb (56.7 kg)   03/10/17 124 lb (56.2 kg)     General Appearance:   Alert, cooperative and in no acute distress.   ENT/Mouth: Oral mucuos membranes pink and  moist .      EYES:  no scleral icterus, normal conjunctivae   Neck: JVP normal. No Hepatojugular reflux. Thyroid not visualized.   Chest/Lungs:   Lungs are clear to auscultation, equal chest wall expansion.   Cardiovascular:   S1, S2 without murmur ,clicks or rubs. Brachial, radial and posterior tibial pulses are intact and symetric. No carotid bruits noted   Abdomen:  Nontender. BS+.    Extremities: No cyanosis, clubbing or edema   Skin: no xanthelasma, warm.    Neurologic: normal  bilateral, no tremors.  Normal gait     Psychiatric: Appropriate affect.      General: WNL  Eyes: WNL  Ears/Nose/Throat: WNL  Lungs: WNL  Heart: WNL  Stomach: WNL  Bladder: WNL  Muscle/Joints: WNL  Skin: WNL  Nervous System: WNL  Mental Health: WNL     Blood: Easy Bruising       Medical History  Surgical History Family History Social History   Past Medical History:   Diagnosis Date   ? Anxiety    ? Atrophic vaginitis    ? Dissection of coronary artery 5/10/2018   ? Heart attack (H) 2011   ? HTN (hypertension)    ? Prinzmetal angina (H)     Past Surgical History:   Procedure Laterality Date   ? TUBAL LIGATION      Family History   Problem Relation Age of Onset   ? Heart failure Father 71   ? Diabetes type II Mother 70   ? Atrial fibrillation Mother    ? Cancer Sister    ? Parkinsonism Brother     Social History     Socioeconomic History   ? Marital status:      Spouse name: Not on file   ? Number of children: Not on file   ? Years of education: Not on file   ? Highest education level: Not on file   Occupational History   ? Not on file   Social Needs   ? Financial resource strain: Not on file   ? Food insecurity:     Worry: Not on file     Inability: Not on file   ? Transportation needs:     Medical: Not on file     Non-medical: Not on file   Tobacco Use   ? Smoking status: Former Smoker     Types: Cigarettes     Last attempt to quit: 1991     Years since quittin.2   ? Smokeless tobacco: Never Used   Substance  and Sexual Activity   ? Alcohol use: Yes     Alcohol/week: 1.7 standard drinks     Types: 2 drink(s) per week     Comment: occasional etoh use    ? Drug use: No   ? Sexual activity: Not on file   Lifestyle   ? Physical activity:     Days per week: Not on file     Minutes per session: Not on file   ? Stress: Not on file   Relationships   ? Social connections:     Talks on phone: Not on file     Gets together: Not on file     Attends [employer] service: Not on file     Active member of club or organization: Not on file     Attends meetings of clubs or organizations: Not on file     Relationship status: Not on file   ? Intimate partner violence:     Fear of current or ex partner: Not on file     Emotionally abused: Not on file     Physically abused: Not on file     Forced sexual activity: Not on file   Other Topics Concern   ? Not on file   Social History Narrative    Lives in a house, just got insurance          Medications  Allergies   Current Outpatient Medications   Medication Sig Dispense Refill   ? aspirin 81 MG EC tablet Take 81 mg by mouth daily.     ? cholecalciferol, vitamin D3, 1,000 unit tablet Take 1,000 Units by mouth daily.     ? coQ10, ubiquinol, 200 mg cap Take 200 mg by mouth 2 (two) times a day.     ? diltiazem (CARDIZEM CD) 120 MG 24 hr capsule TAKE 1 CAPSULE DAILY 90 capsule 0   ? ESTRADIOL VAGL Insert 1 application into the vagina every other day. bioidentical cream 0.75mg/GM apply 0.5mg per vagina lizbet other day     ? LACTOBACILLUS ACIDOPHILUS (PROBIOTIC ORAL) Take by mouth daily.     ? metoprolol succinate (TOPROL-XL) 50 MG 24 hr tablet TAKE 1 TABLET DAILY 90 tablet 1   ? NATURE-THROID 97.5 mg Tab Take 0.5 tablets by mouth daily.  3   ? NON FORMULARY Take 3 tablets by mouth daily. Supplement name Bone Strength     ? NON FORMULARY Take 1 tablet by mouth daily. Supplement - Lung, Bronchial, & Sinus Health     ? UNABLE TO FIND Med Name: progest cream  1/4 tsp twice daily     ? UNABLE TO FIND Med  Name: DHEA cream  1/4 tsp twice daily     ? UNABLE TO FIND Take 400 mcg by mouth daily. Med Name: Active folic acid methyl folate capsules     ? C/SOURCHERRY/CELERY/GRAPE SEED (TART CHERRY ORAL) Take 1 capsule by mouth daily.     ? LORazepam (ATIVAN) 0.5 MG tablet Take 1 tablet by mouth as needed.     ? metoprolol succinate (TOPROL-XL) 50 MG 24 hr tablet Take 1 tablet (50 mg total) by mouth daily. 90 tablet 3   ? nitroglycerin (NITROSTAT) 0.4 MG SL tablet Place 0.4 mg under the tongue every 5 (five) minutes as needed for chest pain.     ? NON FORMULARY Take 1 tablet by mouth daily. Supplement name Trace Minerals with B12     ? omega-3/dha/epa/fish oil (FISH OIL-OMEGA-3 FATTY ACIDS) 300-1,000 mg capsule Take 2 g by mouth daily.     ? thyroid, pork, (NATURE-THROID) 16.25 mg Tab Take 16.25 mg by mouth 2 (two) times a day.     ? vitamin A 31712 UNIT capsule Take 10,000 Units by mouth daily.       No current facility-administered medications for this visit.     Allergies   Allergen Reactions   ? Remeron [Mirtazapine] Rash         Lab Results    Chemistry/lipid CBC Cardiac Enzymes/BNP/TSH/INR   Lab Results   Component Value Date    CHOL 185 09/24/2014    HDL 57 09/24/2014    LDLCALC 111 09/24/2014    TRIG 87 09/24/2014    CREATININE 0.81 09/24/2014    BUN 17 09/24/2014    K 3.8 09/24/2014     09/24/2014     09/24/2014    CO2 27 09/24/2014    Lab Results   Component Value Date    WBC 4.9 09/24/2014    HGB 14.2 09/24/2014    HCT 43.0 09/24/2014    MCV 93 09/24/2014     09/24/2014    Lab Results   Component Value Date    TROPONINI 0.11 09/24/2014    INR 0.94 09/05/2011

## 2021-07-08 DIAGNOSIS — E03.9 ACQUIRED HYPOTHYROIDISM: Primary | ICD-10-CM

## 2021-07-08 LAB
T3FREE SERPL-MCNC: 2.8 PG/ML (ref 2.3–4.2)
T4 FREE SERPL-MCNC: 0.76 NG/DL (ref 0.76–1.46)
TSH SERPL DL<=0.005 MIU/L-ACNC: 2.17 MU/L (ref 0.4–4)

## 2021-07-08 PROCEDURE — 84443 ASSAY THYROID STIM HORMONE: CPT | Performed by: FAMILY MEDICINE

## 2021-07-08 PROCEDURE — 84439 ASSAY OF FREE THYROXINE: CPT | Performed by: FAMILY MEDICINE

## 2021-07-08 PROCEDURE — 84481 FREE ASSAY (FT-3): CPT | Performed by: FAMILY MEDICINE

## 2021-07-08 PROCEDURE — 36415 COLL VENOUS BLD VENIPUNCTURE: CPT | Performed by: FAMILY MEDICINE

## 2021-09-02 ENCOUNTER — MYC MEDICAL ADVICE (OUTPATIENT)
Dept: FAMILY MEDICINE | Facility: CLINIC | Age: 61
End: 2021-09-02

## 2021-09-02 DIAGNOSIS — E04.2 NONTOXIC MULTINODULAR GOITER: Primary | ICD-10-CM

## 2021-09-02 NOTE — CONFIDENTIAL NOTE
"Ultrasound 2/2019 - stable multinodular goiter.   2018 2017 2016    Up to date: stable multinodular goiters    Asymptomatic, stable goiter -- Asymptomatic, euthyroid patients with benign multinodular goiters (<80 mL) do not require any specific treatment but should be monitored for the development of thyroid dysfunction or for continued growth of the thyroid gland and/or the development of obstructive symptoms.  We perform annual neck exams and obtain yearly thyroid function tests (TSH). We also monitor with thyroid ultrasound (initially annually, then at increasing intervals over time assuming stability [eg, two to five years] to evaluate for growth). Nodules with indeterminate or suspicious ultrasound features should be considered for biopsy because the prevalence of cancer in an individual nodule in a goiter is independent of the number of sonographically identified nodules. The evaluation and management of thyroid nodules are reviewed separately. (See \"Diagnostic approach to and treatment of thyroid nodules\".)      "

## 2021-09-08 ENCOUNTER — TRANSFERRED RECORDS (OUTPATIENT)
Dept: HEALTH INFORMATION MANAGEMENT | Facility: CLINIC | Age: 61
End: 2021-09-08
Payer: COMMERCIAL

## 2021-09-10 ENCOUNTER — HOSPITAL ENCOUNTER (OUTPATIENT)
Dept: ULTRASOUND IMAGING | Facility: CLINIC | Age: 61
Discharge: HOME | End: 2021-09-10
Attending: FAMILY MEDICINE | Admitting: FAMILY MEDICINE
Payer: COMMERCIAL

## 2021-09-10 DIAGNOSIS — E04.2 NONTOXIC MULTINODULAR GOITER: ICD-10-CM

## 2021-09-10 PROCEDURE — 76536 US EXAM OF HEAD AND NECK: CPT

## 2021-09-19 DIAGNOSIS — F41.9 ANXIETY: ICD-10-CM

## 2021-09-19 DIAGNOSIS — F43.10 PTSD (POST-TRAUMATIC STRESS DISORDER): ICD-10-CM

## 2021-09-20 NOTE — TELEPHONE ENCOUNTER
Routing refill request to provider for review/approval because:  Drug not on the [facility] refill protocol   Jessenia Torrez RN

## 2021-09-22 RX ORDER — LORAZEPAM 0.5 MG/1
TABLET ORAL
Qty: 20 TABLET | OUTPATIENT
Start: 2021-09-22

## 2021-09-22 NOTE — TELEPHONE ENCOUNTER
Patient needs an in person office visit for this controlled substance refill. Eliana Quinteros M.D.

## 2021-10-02 ENCOUNTER — HEALTH MAINTENANCE LETTER (OUTPATIENT)
Age: 61
End: 2021-10-02

## 2021-10-04 ENCOUNTER — MYC MEDICAL ADVICE (OUTPATIENT)
Dept: FAMILY MEDICINE | Facility: CLINIC | Age: 61
End: 2021-10-04

## 2021-10-04 DIAGNOSIS — Z20.822 ENCOUNTER FOR LABORATORY TESTING FOR COVID-19 VIRUS: Primary | ICD-10-CM

## 2021-10-05 ENCOUNTER — TRANSFERRED RECORDS (OUTPATIENT)
Dept: HEALTH INFORMATION MANAGEMENT | Facility: CLINIC | Age: 61
End: 2021-10-05
Payer: COMMERCIAL

## 2021-10-07 NOTE — TELEPHONE ENCOUNTER
Lab received orders from [facility] Dr. Sebastian Mcguire but needs reordered from me.  Ordered.  Kirstin Zamora PA-C

## 2021-10-11 ENCOUNTER — TELEPHONE (OUTPATIENT)
Dept: FAMILY MEDICINE | Facility: CLINIC | Age: 61
End: 2021-10-11

## 2021-10-11 ENCOUNTER — OFFICE VISIT (OUTPATIENT)
Dept: FAMILY MEDICINE | Facility: CLINIC | Age: 61
End: 2021-10-11
Payer: COMMERCIAL

## 2021-10-11 VITALS
SYSTOLIC BLOOD PRESSURE: 138 MMHG | WEIGHT: 121.4 LBS | OXYGEN SATURATION: 97 % | TEMPERATURE: 99 F | HEIGHT: 61 IN | HEART RATE: 62 BPM | BODY MASS INDEX: 22.92 KG/M2 | RESPIRATION RATE: 16 BRPM | DIASTOLIC BLOOD PRESSURE: 88 MMHG

## 2021-10-11 DIAGNOSIS — K58.0 IRRITABLE BOWEL SYNDROME WITH DIARRHEA: ICD-10-CM

## 2021-10-11 DIAGNOSIS — Z01.818 PREOP GENERAL PHYSICAL EXAM: Primary | ICD-10-CM

## 2021-10-11 DIAGNOSIS — E04.1 THYROID NODULE: ICD-10-CM

## 2021-10-11 DIAGNOSIS — I20.1 PRINZMETAL ANGINA (H): ICD-10-CM

## 2021-10-11 DIAGNOSIS — F41.9 ANXIETY: ICD-10-CM

## 2021-10-11 DIAGNOSIS — R07.2 PRECORDIAL PAIN: ICD-10-CM

## 2021-10-11 DIAGNOSIS — N84.0 UTERINE POLYP: ICD-10-CM

## 2021-10-11 DIAGNOSIS — N95.0 POSTMENOPAUSAL BLEEDING: ICD-10-CM

## 2021-10-11 DIAGNOSIS — I10 ESSENTIAL HYPERTENSION: ICD-10-CM

## 2021-10-11 LAB
ANION GAP SERPL CALCULATED.3IONS-SCNC: 2 MMOL/L (ref 3–14)
BASOPHILS # BLD AUTO: 0 10E3/UL (ref 0–0.2)
BASOPHILS NFR BLD AUTO: 0 %
BUN SERPL-MCNC: 23 MG/DL (ref 7–30)
CALCIUM SERPL-MCNC: 8.7 MG/DL (ref 8.5–10.1)
CHLORIDE BLD-SCNC: 108 MMOL/L (ref 94–109)
CO2 SERPL-SCNC: 30 MMOL/L (ref 20–32)
CREAT SERPL-MCNC: 1.31 MG/DL (ref 0.52–1.04)
EOSINOPHIL # BLD AUTO: 0.3 10E3/UL (ref 0–0.7)
EOSINOPHIL NFR BLD AUTO: 6 %
ERYTHROCYTE [DISTWIDTH] IN BLOOD BY AUTOMATED COUNT: 13.2 % (ref 10–15)
GFR SERPL CREATININE-BSD FRML MDRD: 44 ML/MIN/1.73M2
GLUCOSE BLD-MCNC: 87 MG/DL (ref 70–99)
HCT VFR BLD AUTO: 42.9 % (ref 35–47)
HGB BLD-MCNC: 14.1 G/DL (ref 11.7–15.7)
LYMPHOCYTES # BLD AUTO: 2 10E3/UL (ref 0.8–5.3)
LYMPHOCYTES NFR BLD AUTO: 37 %
MCH RBC QN AUTO: 31.8 PG (ref 26.5–33)
MCHC RBC AUTO-ENTMCNC: 32.9 G/DL (ref 31.5–36.5)
MCV RBC AUTO: 97 FL (ref 78–100)
MONOCYTES # BLD AUTO: 0.7 10E3/UL (ref 0–1.3)
MONOCYTES NFR BLD AUTO: 12 %
NEUTROPHILS # BLD AUTO: 2.5 10E3/UL (ref 1.6–8.3)
NEUTROPHILS NFR BLD AUTO: 45 %
PLATELET # BLD AUTO: 213 10E3/UL (ref 150–450)
POTASSIUM BLD-SCNC: 4 MMOL/L (ref 3.4–5.3)
RBC # BLD AUTO: 4.44 10E6/UL (ref 3.8–5.2)
SODIUM SERPL-SCNC: 140 MMOL/L (ref 133–144)
TSH SERPL DL<=0.005 MIU/L-ACNC: 2.96 MU/L (ref 0.4–4)
WBC # BLD AUTO: 5.5 10E3/UL (ref 4–11)

## 2021-10-11 PROCEDURE — 36415 COLL VENOUS BLD VENIPUNCTURE: CPT | Performed by: PHYSICIAN ASSISTANT

## 2021-10-11 PROCEDURE — 84443 ASSAY THYROID STIM HORMONE: CPT | Performed by: PHYSICIAN ASSISTANT

## 2021-10-11 PROCEDURE — 99215 OFFICE O/P EST HI 40 MIN: CPT | Performed by: PHYSICIAN ASSISTANT

## 2021-10-11 PROCEDURE — 85025 COMPLETE CBC W/AUTO DIFF WBC: CPT | Performed by: PHYSICIAN ASSISTANT

## 2021-10-11 PROCEDURE — 93000 ELECTROCARDIOGRAM COMPLETE: CPT | Performed by: PHYSICIAN ASSISTANT

## 2021-10-11 PROCEDURE — 80048 BASIC METABOLIC PNL TOTAL CA: CPT | Performed by: PHYSICIAN ASSISTANT

## 2021-10-11 RX ORDER — ESTRADIOL 0.05 MG/D
PATCH TRANSDERMAL
COMMUNITY
Start: 2021-08-03 | End: 2021-10-13

## 2021-10-11 RX ORDER — PROGESTERONE 100 MG/1
CAPSULE ORAL
COMMUNITY
Start: 2021-08-03 | End: 2021-10-12

## 2021-10-11 RX ORDER — THYROID 30 MG/1
30 TABLET ORAL DAILY
COMMUNITY
End: 2023-04-19

## 2021-10-11 RX ORDER — TESTOSTERONE MICRONIZED 100 %
POWDER (GRAM) MISCELLANEOUS
COMMUNITY
End: 2021-10-12

## 2021-10-11 ASSESSMENT — MIFFLIN-ST. JEOR: SCORE: 1055.35

## 2021-10-11 NOTE — TELEPHONE ENCOUNTER
Please call cardiology on Tuesday -   [phone]  Dr. Eugene Campbell's office - [place].  I tried calling tonight but they have no message service for after hours, it says to call back between 8-4.    Please call his care team - patient saw him in 2019 and is due for 2 year follow up. She has coronary artery dissection history and she had recent chest pain. She was in today for a preop exam and due to this I recommended cardiac clearance prior to surgery.   Are they able to get her in more quickly? Any evaluation they want me to order prior to this? She had normal EKG this evening.  I am hoping the care team is able to squeeze her in sooner than later so we do not need to postpone surgery very long.  Kirstin Zamora PA-C

## 2021-10-11 NOTE — PROGRESS NOTES
[facility]  [address] [facility] [address]  Phone: [phone]  Primary Provider: Haydee Wan  Pre-op Performing Provider: HAYDEE WAN    PREOPERATIVE EVALUATION:  Today's date: 10/11/2021    Davida Mo is a 61 year old female who presents for a preoperative evaluation.    Surgical Information:  Surgery/Procedure: D and C  Surgery Location: [facility] in [place]  Surgeon: Dr. Sebastian Mcguire    Surgery Date: 10/19/2021  Time of Surgery: TBD  Where patient plans to recover: At home with family  Fax number for surgical facility: [phone]    Type of Anesthesia Anticipated: General        Assessment & Plan     The proposed surgical procedure is considered INTERMEDIATE risk.    ASSESSMENT/PLAN:      ICD-10-CM    1. Preop general physical exam  Z01.818 TSH with free T4 reflex     Basic metabolic panel  (Ca, Cl, CO2, Creat, Gluc, K, Na, BUN)     CBC with platelets and differential     EKG 12-lead complete w/read - Clinics     TSH with free T4 reflex     Basic metabolic panel  (Ca, Cl, CO2, Creat, Gluc, K, Na, BUN)     CBC with platelets and differential   2. Uterine polyp  N84.0    3. Postmenopausal bleeding  N95.0    4. Prinzmetal angina (H)  I20.1    5. Essential hypertension  I10         Risks and Recommendations:  The patient has the following additional risks and recommendations for perioperative complications:  due to recent chest pain and patient's cardiac history, recommended cardiac clearance prior to surgical clearance. Patient agrees.  Cardiovascular:   - Cardiology consulted 10/12/21. Normal stress test, cardiology cleared for procedure.    Medication Instructions:  Patient is to take all scheduled medications on the day of surgery EXCEPT for modifications listed below:  do not take otc/supplements day of surgery   - aspirin: Discontinue aspirin 7-10 days prior to procedure to reduce bleeding risk. It should be resumed postoperatively.    - Beta  Blockers: Continue taking on the day of surgery.   - Calcium Channel Blockers: May be continued on the day of surgery.    RECOMMENDATION:  APPROVAL GIVEN to proceed with proposed procedure, without further diagnostic evaluation.    54 minutes spent on the date of the encounter doing chart review, history and exam, documentation and further activities per the note     Subjective     HPI related to upcoming procedure: D&C recommended for intermittent postmenopausal bleeding >1 year. Ultrasound showed uterine polyps. Had biopsy done but told not enough cells so recommended D&C for treatment and biopsy.      Preop Questions 10/11/2021   1. Have you ever had a heart attack or stroke? YES - coronary artery dissection 2011, 2014   2. Have you ever had surgery on your heart or blood vessels, such as a stent placement, a coronary artery bypass, or surgery on an artery in your head, neck, heart, or legs? No   3. Do you have chest pain with activity? No   4. Do you have a history of  heart failure? No   5. Do you currently have a cold, bronchitis or symptoms of other infection? No   6. Do you have a cough, shortness of breath, or wheezing? No   7. Do you or anyone in your family have previous history of blood clots? No   8. Do you or does anyone in your family have a serious bleeding problem such as prolonged bleeding following surgeries or cuts? No   9. Have you ever had problems with anemia or been told to take iron pills? No   10. Have you had any abnormal blood loss such as black, tarry or bloody stools, or abnormal vaginal bleeding? YES - postmenopausal bleeding   11. Have you ever had a blood transfusion? No   12. Are you willing to have a blood transfusion if it is medically needed before, during, or after your surgery? Yes   13. Have you or any of your relatives ever had problems with anesthesia? No   14. Do you have sleep apnea, excessive snoring or daytime drowsiness? No   15. Do you have any artifical heart valves  or other implanted medical devices like a pacemaker, defibrillator, or continuous glucose monitor? No   16. Do you have artificial joints? No   17. Are you allergic to latex? No     Health Care Directive:  Patient does not have a Health Care Directive or Living Will: Discussed advance care planning with patient; information given to patient to review.    Preoperative Review of :   reviewed - no record of controlled substances prescribed.    Status of Chronic Conditions:  See problem list for active medical problems.  Problems all longstanding and stable, except as noted/documented.  See ROS for pertinent symptoms related to these conditions.    HYPERTENSION - Patient has longstanding history of HTN , currently denies any symptoms referable to elevated blood pressure. Specifically denies chest pain, palpitations, dyspnea, orthopnea, PND or peripheral edema. Blood pressure readings have been in normal range. Current medication regimen is as listed below. Patient denies any side effects of medication.     HYPOTHYROIDISM - Patient has a longstanding history of chronic Hypothyroidism. Patient has been doing well, noting no tremor, insomnia, hair loss or changes in skin texture. Continues to take medications as directed, without adverse reactions or side effects. Last TSH   Lab Results   Component Value Date    TSH 2.17 07/08/2021       History of Prinzmetal Angina/coronary artery dissection  Visit with cardiology 12/2019  Patient with suspected coronary artery dissection now almost 5 years ago who is stable at this time.  Her blood pressure is reasonable, she takes an antiplatelet agent and her LDL cholesterol is reasonable.  I have not made any changes in her medication.  I have encouraged her to maintain an active lifestyle and even exercise 30 minutes, 5 times a week on a regular basis.  I have asked her to call if she develops any new symptomatology but we will plan on seeing her back in 2 years.    Review of  Systems  CONSTITUTIONAL: NEGATIVE for fever, chills, change in weight  INTEGUMENTARY/SKIN: NEGATIVE for worrisome rashes, moles or lesions  EYES: NEGATIVE for vision changes or irritation  ENT/MOUTH: NEGATIVE for ear, mouth and throat problems  RESP: NEGATIVE for significant cough or SOB  CV: POSITIVE for chest pain/chest pressure and NEGATIVE for cyanosis, dyspnea on exertion, irregular heart beat, lower extremity edema, palpitations and syncope or near-syncope  GI: NEGATIVE for nausea, abdominal pain, heartburn, or change in bowel habits  : NEGATIVE for frequency, dysuria, or hematuria  MUSCULOSKELETAL: NEGATIVE for significant arthralgias or myalgia  NEURO: NEGATIVE for weakness, dizziness or paresthesias  ENDOCRINE: NEGATIVE for temperature intolerance, skin/hair changes  HEME/ALLERGY/IMMUNE: POSITIVE  for postmenopausal bleeding  PSYCHIATRIC: NEGATIVE for changes in mood or affect     Started new hormones (pills, patches), had chest pain shortly after starting these.  About 1 month ago she was walking up driveway repeatedly and had burning/dull pain mid chest, and was constant for days until she stopped the hormones/started the progesterone cream. She denies palpitations, SOB, lightheadedness, diaphoresis.     Patient Active Problem List    Diagnosis Date Noted     Essential hypertension 09/24/2014     Priority: High     Irritable bowel syndrome with diarrhea 10/11/2021     Priority: Medium     Dissection of coronary artery 05/10/2018     Priority: Medium     Prinzmetal angina (H) 06/30/2016     Priority: Medium     Thyroid nodule 08/24/2015     Priority: Medium     Chest pain 09/23/2014     Priority: Medium     Anxiety 12/11/2012     Priority: Medium     Lentigo 12/11/2012     Priority: Medium     Seborrheic keratosis 12/11/2012     Priority: Medium     Atrophic vaginitis 03/18/2014     Priority: Low      Past Medical History:   Diagnosis Date     Arthritis      Depression     on meds from 96 to 06. no  symptoms have returned     NSTEMI (non-ST elevated myocardial infarction) (H) 2011,9/23/2014    see care everywhere     Prinzmetal angina (H)     first heart attack. prinzmetal angina diagnosis     Past Surgical History:   Procedure Laterality Date     TUBAL LIGATION       TUBAL LIGATION       Current Outpatient Medications   Medication Sig Dispense Refill     aspirin 81 MG EC tablet Take 81 mg by mouth every other day  90 tablet 3     diltiazem 120 MG 24 hr CD capsule Take 1 capsule (120 mg) by mouth daily 30 capsule 0     estradiol (CLIMARA) 0.05 MG/24HR weekly patch APPLY 1 PATCH TO SKIN TRANSDERMALLY WEEKLY       L-Tryptophan 500 MG TABS Take 500 mg by mouth 2 times daily       metoprolol (TOPROL-XL) 50 MG 24 hr tablet Take 50 mg by mouth daily        OVER-THE-COUNTER Take 200 mg by mouth 3 times daily L-theanine       OVER-THE-COUNTER Apply 0.25 teaspoonful topically daily Progest Cream - Hormone balancing       progesterone (PROMETRIUM) 100 MG capsule TAKE 1 CAPSULE ORALLY ONCE A DAY       testosterone POWD Cream once daily       thyroid (NP THYROID) 30 MG tablet Take 30 mg by mouth daily       Ubiquinol 100 MG CAPS Take 1 capsule by mouth daily       Vitamin D-Vitamin K (VITAMIN K2-VITAMIN D3 PO) 3 drops daily D3 = 1208, K = 25 mcg       LORazepam (ATIVAN) 0.5 MG tablet Take 1 at onset of panic attack. May repeat in 8 hours if needed. Max 20 tabs per month (Patient not taking: Reported on 10/11/2021) 20 tablet 2     STATIN NOT PRESCRIBED, INTENTIONAL, 1 each At Bedtime Statin not prescribed intentionally due to Allergy to statin - okay to stop by cardiology (Patient not taking: Reported on 10/11/2021) 0 each 0       Allergies   Allergen Reactions     Kiwi Anaphylaxis     Egg White (Diagnostic) Other (See Comments)     tested     Lactose Other (See Comments)     Contraindicated due to Thyroid, Hashimoto     Magnesium GI Disturbance     Milk Protein Extract Other (See Comments)     Contraindicated due to  "Thyroid, Hashimoto     Other (Do Not Use) Anxiety, Cramps, Diarrhea, GI Disturbance and Other (See Comments)     Remeron [Mirtazapine] Rash     Patient states that medication made her extremly tired, felt like she could not function until late afternoon.  Also noted having rash all over body        Social History     Tobacco Use     Smoking status: Former Smoker     Years: 10.00     Types: Cigarettes     Start date: 9/10/1972     Quit date: 1991     Years since quittin.0     Smokeless tobacco: Never Used   Substance Use Topics     Alcohol use: Yes     Comment: Occasionally     Family History   Problem Relation Age of Onset     Thyroid Disease Sister      Lung Cancer Sister 67        Lung Cancer, removal of left lower lung     Diabetes Mother      Hypertension Mother      Allergies Mother      Arthritis Mother      Eye Disorder Mother      Gastrointestinal Disease Mother         diverticulitis     Osteoporosis Mother         and spinal stenosis     Asthma Mother      Atrial fibrillation Mother      Diverticulitis Mother      Heart Disease Father      Alcohol/Drug Father      Irritable Bowel Syndrome Father         possibly     Cancer Sister      Neurologic Disorder Sister         brain tumor / cancer     Bone Cancer Sister      Breast Cancer No family hx of      Heart Failure Father 71.00     Diabetes Type 2  Mother 70.00     Cancer Sister      Parkinsonism Brother      History   Drug Use No         Objective     BP (!) 157/93   Pulse 62   Temp 99  F (37.2  C) (Tympanic)   Resp 16   Ht 1.561 m (5' 1.46\")   Wt 55.1 kg (121 lb 6.4 oz)   SpO2 97%   BMI 22.60 kg/m      Physical Exam    GENERAL APPEARANCE: healthy, alert and no distress     EYES: EOMI, PERRL     HENT: ear canals and TM's normal and nose and mouth without ulcers or lesions     NECK: no adenopathy, no asymmetry, masses, or scars and thyroid normal to palpation     RESP: lungs clear to auscultation - no rales, rhonchi or wheezes     CV: " regular rates and rhythm, normal S1 S2, no S3 or S4 and no murmur, click or rub     ABDOMEN:  soft, nontender, no HSM or masses and bowel sounds normal     MS: extremities normal- no gross deformities noted, no evidence of inflammation in joints, FROM in all extremities.     SKIN: no suspicious lesions or rashes     NEURO: Normal strength and tone, sensory exam grossly normal, mentation intact and speech normal     PSYCH: mentation appears normal. and affect normal/bright     LYMPHATICS: No cervical adenopathy    Recent Labs   Lab Test 11/04/20  1416 03/09/20  1207   HGB 14.0  --      --     143   POTASSIUM 4.1 3.6   CR 0.89 0.87        Diagnostics:  Recent Results (from the past 168 hour(s))   TSH with free T4 reflex    Collection Time: 10/11/21  5:42 PM   Result Value Ref Range    TSH 2.96 0.40 - 4.00 mU/L   Basic metabolic panel  (Ca, Cl, CO2, Creat, Gluc, K, Na, BUN)    Collection Time: 10/11/21  5:42 PM   Result Value Ref Range    Sodium 140 133 - 144 mmol/L    Potassium 4.0 3.4 - 5.3 mmol/L    Chloride 108 94 - 109 mmol/L    Carbon Dioxide (CO2) 30 20 - 32 mmol/L    Anion Gap 2 (L) 3 - 14 mmol/L    Urea Nitrogen 23 7 - 30 mg/dL    Creatinine 1.31 (H) 0.52 - 1.04 mg/dL    Calcium 8.7 8.5 - 10.1 mg/dL    Glucose 87 70 - 99 mg/dL    GFR Estimate 44 (L) >60 mL/min/1.73m2   CBC with platelets and differential    Collection Time: 10/11/21  5:42 PM   Result Value Ref Range    WBC Count 5.5 4.0 - 11.0 10e3/uL    RBC Count 4.44 3.80 - 5.20 10e6/uL    Hemoglobin 14.1 11.7 - 15.7 g/dL    Hematocrit 42.9 35.0 - 47.0 %    MCV 97 78 - 100 fL    MCH 31.8 26.5 - 33.0 pg    MCHC 32.9 31.5 - 36.5 g/dL    RDW 13.2 10.0 - 15.0 %    Platelet Count 213 150 - 450 10e3/uL    % Neutrophils 45 %    % Lymphocytes 37 %    % Monocytes 12 %    % Eosinophils 6 %    % Basophils 0 %    Absolute Neutrophils 2.5 1.6 - 8.3 10e3/uL    Absolute Lymphocytes 2.0 0.8 - 5.3 10e3/uL    Absolute Monocytes 0.7 0.0 - 1.3 10e3/uL    Absolute  Eosinophils 0.3 0.0 - 0.7 10e3/uL    Absolute Basophils 0.0 0.0 - 0.2 10e3/uL      EKG: appears normal, NSR, normal axis, normal intervals, no acute ST/T changes c/w ischemia, no LVH by voltage criteria, unchanged from previous tracings    Revised Cardiac Risk Index (RCRI):  The patient has the following serious cardiovascular risks for perioperative complications:   - Coronary Artery Disease (MI, positive stress test, angina, Qs on EKG) = 1 point     RCRI Interpretation: 1 point: Class II (low risk - 0.9% complication rate)           Signed Electronically by: Paulina Zamora PA-C  Copy of this evaluation report is provided to requesting physician.

## 2021-10-12 ENCOUNTER — OFFICE VISIT (OUTPATIENT)
Dept: CARDIOLOGY | Facility: CLINIC | Age: 61
End: 2021-10-12
Payer: COMMERCIAL

## 2021-10-12 ENCOUNTER — MYC MEDICAL ADVICE (OUTPATIENT)
Dept: FAMILY MEDICINE | Facility: CLINIC | Age: 61
End: 2021-10-12

## 2021-10-12 VITALS
SYSTOLIC BLOOD PRESSURE: 144 MMHG | BODY MASS INDEX: 22.19 KG/M2 | WEIGHT: 120.6 LBS | HEART RATE: 92 BPM | DIASTOLIC BLOOD PRESSURE: 90 MMHG | HEIGHT: 62 IN | RESPIRATION RATE: 16 BRPM

## 2021-10-12 DIAGNOSIS — R07.2 PRECORDIAL PAIN: ICD-10-CM

## 2021-10-12 DIAGNOSIS — I10 ESSENTIAL HYPERTENSION: ICD-10-CM

## 2021-10-12 DIAGNOSIS — I25.42 DISSECTION OF CORONARY ARTERY: Primary | ICD-10-CM

## 2021-10-12 PROCEDURE — 99214 OFFICE O/P EST MOD 30 MIN: CPT | Performed by: INTERNAL MEDICINE

## 2021-10-12 ASSESSMENT — MIFFLIN-ST. JEOR: SCORE: 1060.29

## 2021-10-12 NOTE — PATIENT INSTRUCTIONS
[facility]  [phone]    Davida Mo,    It was a pleasure to see you today at the [facility].     My recommendations after this visit include:    1.  We will get a stress echocardiogram this week, prior to your surgical procedure next week.  We will call you with the results and any further recommendations.    2.  Please check your blood pressure 3 or 4 times a week for the next 2 to 3 weeks.  Please call us with some blood pressure readings.  Please call Aydee Palomino RN at [phone].          Eugene Campbell

## 2021-10-12 NOTE — PROGRESS NOTES
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  She has been stable but has developed recurrence of precordial pain although not exactly like the discomfort she had with her myocardial infarction.  She has some mild pressure sensation which seems to be worse when she takes a progesterone type of hormone she is off of that now it is better.  She is scheduled for D&C with possible general anesthesia 1 week from today.    I would like to get a stress echocardiogram to evaluate for regional wall motion normalities to make sure that there is no myocardial ischemia with physical activity.  We will also be able to see what her blood pressure is.    Blood pressure is not at goal today and she will take her blood pressure at home 3 or 4 times a week for the next 2 to 3 weeks and call us.  My goal would be to keep her blood pressure below 130 and potentially even low 120 given her history of probable spontaneous coronary artery dissection.  My next step would be to increase her diltiazem as she is on a very low dose.  She is also tolerated diltiazem.    She does not take a statin and her lipids were quite reasonable on last check.  She is not interested in taking statins because of potential side effects.    Thank you for allowing us to participate in her care.       History of Present Illness/Subjective    Ms. 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  This was not definitive but the findings were suggestive of this on angiography.  She is overall been doing well for several years but lately has been using some hormones for some gynecological reasons.  When she was on progesterone she feels like she gets some chest pressure  "and she noticed this the first time walking up a hill at her home and it gradually abated after she laid down.  She has had it several times and is experimented by not taking the progesterone and this seems to help and really eliminates it.  She does not exercise on a regular basis but does a lot of yard work and activity around the house.  When she is not taking the progesterone she is not noticed exertionally related discomfort.  The discomfort that she did have an when walking up the hill was not associated with shortness of breath, diaphoresis nausea or vomiting and it did not radiate.        ECG: Personally reviewed.  ECG from her clinic yesterday is normal and reviewed.       Physical Examination Review of Systems   BP (!) 144/90   Pulse 92   Resp 16   Ht 1.575 m (5' 2\")   Wt 54.7 kg (120 lb 9.6 oz)   BMI 22.06 kg/m    Body mass index is 22.06 kg/m .  Wt Readings from Last 3 Encounters:   10/12/21 54.7 kg (120 lb 9.6 oz)   10/11/21 55.1 kg (121 lb 6.4 oz)   11/04/20 54.4 kg (120 lb)     General Appearance:   Alert, cooperative and in no acute distress.   ENT/Mouth: Patient wearing a mask.      EYES:  no scleral icterus, normal conjunctivae   Neck: JVP normal. No Hepatojugular reflux. Thyroid not visualized.   Chest/Lungs:   Lungs are clear to auscultation, equal chest wall expansion.   Cardiovascular:   S1, S2 without murmur ,clicks or rubs. Brachial, radial and posterior tibial pulses are intact and symetric. No carotid bruits noted   Abdomen:  Nontender. BS+.    Extremities: No cyanosis, clubbing or edema   Skin: no xanthelasma, warm.    Neurologic: normal arm movement bilateral, no tremors     Psychiatric: Appropriate affect.      Enc Vitals  BP: (!) 144/90  Pulse: 92  Resp: 16  Weight: 54.7 kg (120 lb 9.6 oz)  Height: 157.5 cm (5' 2\")                                           Medical History  Surgical History Family History Social History   Past Medical History:   Diagnosis Date     Arthritis      " Depression     on meds from 96 to 06. no symptoms have returned     NSTEMI (non-ST elevated myocardial infarction) (H) ,2014    see care everywhere     Prinzmetal angina (H)     first heart attack. prinzmetal angina diagnosis    Past Surgical History:   Procedure Laterality Date     TUBAL LIGATION       TUBAL LIGATION      Family History   Problem Relation Age of Onset     Thyroid Disease Sister      Lung Cancer Sister 67        Lung Cancer, removal of left lower lung     Diabetes Mother      Hypertension Mother      Allergies Mother      Arthritis Mother      Eye Disorder Mother      Gastrointestinal Disease Mother         diverticulitis     Osteoporosis Mother         and spinal stenosis     Asthma Mother      Atrial fibrillation Mother      Diverticulitis Mother      Heart Disease Father      Alcohol/Drug Father      Irritable Bowel Syndrome Father         possibly     Cancer Sister      Neurologic Disorder Sister         brain tumor / cancer     Bone Cancer Sister      Breast Cancer No family hx of      Heart Failure Father 71.00     Diabetes Type 2  Mother 70.00     Cancer Sister      Parkinsonism Brother     Social History     Socioeconomic History     Marital status:      Spouse name: Not on file     Number of children: Not on file     Years of education: Not on file     Highest education level: Not on file   Occupational History     Not on file   Tobacco Use     Smoking status: Former Smoker     Years: 10.00     Types: Cigarettes     Start date: 9/10/1972     Quit date: 1991     Years since quittin.0     Smokeless tobacco: Never Used   Substance and Sexual Activity     Alcohol use: Yes     Comment: Occasionally     Drug use: No     Sexual activity: Yes     Partners: Male     Birth control/protection: None   Other Topics Concern     Parent/sibling w/ CABG, MI or angioplasty before 65F 55M? Not Asked   Social History Narrative     Not on file     Social Determinants of Health      Financial Resource Strain:      Difficulty of Paying Living Expenses:    Food Insecurity:      Worried About Running Out of Food in the Last Year:      Ran Out of Food in the Last Year:    Transportation Needs:      Lack of Transportation (Medical):      Lack of Transportation (Non-Medical):    Physical Activity:      Days of Exercise per Week:      Minutes of Exercise per Session:    Stress:      Feeling of Stress :    Social Connections:      Frequency of Communication with Friends and Family:      Frequency of Social Gatherings with Friends and Family:      Attends [employer] Services:      Active Member of Clubs or Organizations:      Attends Club or Organization Meetings:      Marital Status:    Intimate Partner Violence:      Fear of Current or Ex-Partner:      Emotionally Abused:      Physically Abused:      Sexually Abused:           Medications  Allergies   Current Outpatient Medications   Medication Sig Dispense Refill     aspirin 81 MG EC tablet Take 81 mg by mouth every other day  90 tablet 3     CALCIUM PO Take 2 tablets by mouth daily       diltiazem 120 MG 24 hr CD capsule Take 1 capsule (120 mg) by mouth daily 30 capsule 0     L-Tryptophan 500 MG TABS Take 500 mg by mouth 2 times daily       LORazepam (ATIVAN) 0.5 MG tablet Take 1 at onset of panic attack. May repeat in 8 hours if needed. Max 20 tabs per month 20 tablet 2     metoprolol (TOPROL-XL) 50 MG 24 hr tablet Take 50 mg by mouth daily        OVER-THE-COUNTER Take 200 mg by mouth 3 times daily L-theanine       OVER-THE-COUNTER Apply 0.25 teaspoonful topically daily Progest Cream - Hormone balancing       thyroid (NP THYROID) 30 MG tablet Take 30 mg by mouth daily       Ubiquinol 100 MG CAPS Take 1 capsule by mouth daily       Vitamin D-Vitamin K (VITAMIN K2-VITAMIN D3 PO) 3 drops daily D3 = 1208, K = 25 mcg       estradiol (CLIMARA) 0.05 MG/24HR weekly patch APPLY 1 PATCH TO SKIN TRANSDERMALLY WEEKLY (Patient not taking: Reported on  10/12/2021)      Allergies   Allergen Reactions     Kiwi Anaphylaxis     Egg White (Diagnostic) Other (See Comments)     tested     Lactose Other (See Comments)     Contraindicated due to Thyroid, Hashimoto     Magnesium GI Disturbance     Milk Protein Extract Other (See Comments)     Contraindicated due to Thyroid, Hashimoto     Other (Do Not Use) Anxiety, Cramps, Diarrhea, GI Disturbance and Other (See Comments)     Remeron [Mirtazapine] Rash     Patient states that medication made her extremly tired, felt like she could not function until late afternoon.  Also noted having rash all over body         Lab Results    Chemistry/lipid CBC Cardiac Enzymes/BNP/TSH/INR   Lab Results   Component Value Date    CHOL 178 03/09/2020    HDL 63 03/09/2020    TRIG 118 03/09/2020    BUN 23 10/11/2021     10/11/2021    CO2 30 10/11/2021    Lab Results   Component Value Date    WBC 5.5 10/11/2021    HGB 14.1 10/11/2021    HCT 42.9 10/11/2021    MCV 97 10/11/2021     10/11/2021    Lab Results   Component Value Date    TSH 2.96 10/11/2021

## 2021-10-12 NOTE — LETTER
10/12/2021    Paulina Zamora PA-C  [address] [facility] [address]    RE: Davida Mo       Dear Colleague,    I had the pleasure of seeing Davida Mo in the [facility].        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  She has been stable but has developed recurrence of precordial pain although not exactly like the discomfort she had with her myocardial infarction.  She has some mild pressure sensation which seems to be worse when she takes a progesterone type of hormone she is off of that now it is better.  She is scheduled for D&C with possible general anesthesia 1 week from today.    I would like to get a stress echocardiogram to evaluate for regional wall motion normalities to make sure that there is no myocardial ischemia with physical activity.  We will also be able to see what her blood pressure is.    Blood pressure is not at goal today and she will take her blood pressure at home 3 or 4 times a week for the next 2 to 3 weeks and call us.  My goal would be to keep her blood pressure below 130 and potentially even low 120 given her history of probable spontaneous coronary artery dissection.  My next step would be to increase her diltiazem as she is on a very low dose.  She is also tolerated diltiazem.    She does not take a statin and her lipids were quite reasonable on last check.  She is not interested in taking statins because of potential side effects.    Thank you for allowing us to participate in her care.       History of Present Illness/Subjective    Ms. 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  This was not definitive but  "the findings were suggestive of this on angiography.  She is overall been doing well for several years but lately has been using some hormones for some gynecological reasons.  When she was on progesterone she feels like she gets some chest pressure and she noticed this the first time walking up a hill at her home and it gradually abated after she laid down.  She has had it several times and is experimented by not taking the progesterone and this seems to help and really eliminates it.  She does not exercise on a regular basis but does a lot of yard work and activity around the house.  When she is not taking the progesterone she is not noticed exertionally related discomfort.  The discomfort that she did have an when walking up the hill was not associated with shortness of breath, diaphoresis nausea or vomiting and it did not radiate.        ECG: Personally reviewed.  ECG from her clinic yesterday is normal and reviewed.       Physical Examination Review of Systems   BP (!) 144/90   Pulse 92   Resp 16   Ht 1.575 m (5' 2\")   Wt 54.7 kg (120 lb 9.6 oz)   BMI 22.06 kg/m    Body mass index is 22.06 kg/m .  Wt Readings from Last 3 Encounters:   10/12/21 54.7 kg (120 lb 9.6 oz)   10/11/21 55.1 kg (121 lb 6.4 oz)   11/04/20 54.4 kg (120 lb)     General Appearance:   Alert, cooperative and in no acute distress.   ENT/Mouth: Patient wearing a mask.      EYES:  no scleral icterus, normal conjunctivae   Neck: JVP normal. No Hepatojugular reflux. Thyroid not visualized.   Chest/Lungs:   Lungs are clear to auscultation, equal chest wall expansion.   Cardiovascular:   S1, S2 without murmur ,clicks or rubs. Brachial, radial and posterior tibial pulses are intact and symetric. No carotid bruits noted   Abdomen:  Nontender. BS+.    Extremities: No cyanosis, clubbing or edema   Skin: no xanthelasma, warm.    Neurologic: normal arm movement bilateral, no tremors     Psychiatric: Appropriate affect.      Enc Vitals  BP: (!) " "144/90  Pulse: 92  Resp: 16  Weight: 54.7 kg (120 lb 9.6 oz)  Height: 157.5 cm (5' 2\")                                           Medical History  Surgical History Family History Social History   Past Medical History:   Diagnosis Date     Arthritis      Depression     on meds from 96 to 06. no symptoms have returned     NSTEMI (non-ST elevated myocardial infarction) (H) ,2014    see care everywhere     Prinzmetal angina (H)     first heart attack. prinzmetal angina diagnosis    Past Surgical History:   Procedure Laterality Date     TUBAL LIGATION       TUBAL LIGATION      Family History   Problem Relation Age of Onset     Thyroid Disease Sister      Lung Cancer Sister 67        Lung Cancer, removal of left lower lung     Diabetes Mother      Hypertension Mother      Allergies Mother      Arthritis Mother      Eye Disorder Mother      Gastrointestinal Disease Mother         diverticulitis     Osteoporosis Mother         and spinal stenosis     Asthma Mother      Atrial fibrillation Mother      Diverticulitis Mother      Heart Disease Father      Alcohol/Drug Father      Irritable Bowel Syndrome Father         possibly     Cancer Sister      Neurologic Disorder Sister         brain tumor / cancer     Bone Cancer Sister      Breast Cancer No family hx of      Heart Failure Father 71.00     Diabetes Type 2  Mother 70.00     Cancer Sister      Parkinsonism Brother     Social History     Socioeconomic History     Marital status:      Spouse name: Not on file     Number of children: Not on file     Years of education: Not on file     Highest education level: Not on file   Occupational History     Not on file   Tobacco Use     Smoking status: Former Smoker     Years: 10.00     Types: Cigarettes     Start date: 9/10/1972     Quit date: 1991     Years since quittin.0     Smokeless tobacco: Never Used   Substance and Sexual Activity     Alcohol use: Yes     Comment: Occasionally     Drug use: No     " Sexual activity: Yes     Partners: Male     Birth control/protection: None   Other Topics Concern     Parent/sibling w/ CABG, MI or angioplasty before 65F 55M? Not Asked   Social History Narrative     Not on file     Social Determinants of Health     Financial Resource Strain:      Difficulty of Paying Living Expenses:    Food Insecurity:      Worried About Running Out of Food in the Last Year:      Ran Out of Food in the Last Year:    Transportation Needs:      Lack of Transportation (Medical):      Lack of Transportation (Non-Medical):    Physical Activity:      Days of Exercise per Week:      Minutes of Exercise per Session:    Stress:      Feeling of Stress :    Social Connections:      Frequency of Communication with Friends and Family:      Frequency of Social Gatherings with Friends and Family:      Attends Alevism Services:      Active Member of Clubs or Organizations:      Attends Club or Organization Meetings:      Marital Status:    Intimate Partner Violence:      Fear of Current or Ex-Partner:      Emotionally Abused:      Physically Abused:      Sexually Abused:           Medications  Allergies   Current Outpatient Medications   Medication Sig Dispense Refill     aspirin 81 MG EC tablet Take 81 mg by mouth every other day  90 tablet 3     CALCIUM PO Take 2 tablets by mouth daily       diltiazem 120 MG 24 hr CD capsule Take 1 capsule (120 mg) by mouth daily 30 capsule 0     L-Tryptophan 500 MG TABS Take 500 mg by mouth 2 times daily       LORazepam (ATIVAN) 0.5 MG tablet Take 1 at onset of panic attack. May repeat in 8 hours if needed. Max 20 tabs per month 20 tablet 2     metoprolol (TOPROL-XL) 50 MG 24 hr tablet Take 50 mg by mouth daily        OVER-THE-COUNTER Take 200 mg by mouth 3 times daily L-theanine       OVER-THE-COUNTER Apply 0.25 teaspoonful topically daily Progest Cream - Hormone balancing       thyroid (NP THYROID) 30 MG tablet Take 30 mg by mouth daily       Ubiquinol 100 MG CAPS Take 1  capsule by mouth daily       Vitamin D-Vitamin K (VITAMIN K2-VITAMIN D3 PO) 3 drops daily D3 = 1208, K = 25 mcg       estradiol (CLIMARA) 0.05 MG/24HR weekly patch APPLY 1 PATCH TO SKIN TRANSDERMALLY WEEKLY (Patient not taking: Reported on 10/12/2021)      Allergies   Allergen Reactions     Kiwi Anaphylaxis     Egg White (Diagnostic) Other (See Comments)     tested     Lactose Other (See Comments)     Contraindicated due to Thyroid, Hashimoto     Magnesium GI Disturbance     Milk Protein Extract Other (See Comments)     Contraindicated due to Thyroid, Hashimoto     Other (Do Not Use) Anxiety, Cramps, Diarrhea, GI Disturbance and Other (See Comments)     Remeron [Mirtazapine] Rash     Patient states that medication made her extremly tired, felt like she could not function until late afternoon.  Also noted having rash all over body         Lab Results    Chemistry/lipid CBC Cardiac Enzymes/BNP/TSH/INR   Lab Results   Component Value Date    CHOL 178 03/09/2020    HDL 63 03/09/2020    TRIG 118 03/09/2020    BUN 23 10/11/2021     10/11/2021    CO2 30 10/11/2021    Lab Results   Component Value Date    WBC 5.5 10/11/2021    HGB 14.1 10/11/2021    HCT 42.9 10/11/2021    MCV 97 10/11/2021     10/11/2021    Lab Results   Component Value Date    TSH 2.96 10/11/2021                                               Thank you for allowing me to participate in the care of your patient.      Sincerely,     Eugene Campbell MD     [facility]  cc:   No referring provider defined for this encounter.

## 2021-10-12 NOTE — TELEPHONE ENCOUNTER
Chart indicates that patient has appointment with Dr. Eugene Campbell this afternoon on 10/12/21.  Pardeep Flores RN

## 2021-10-13 ENCOUNTER — HOSPITAL ENCOUNTER (OUTPATIENT)
Dept: CARDIOLOGY | Facility: CLINIC | Age: 61
Discharge: HOME | End: 2021-10-13
Attending: INTERNAL MEDICINE | Admitting: INTERNAL MEDICINE
Payer: COMMERCIAL

## 2021-10-13 DIAGNOSIS — N95.1 SYMPTOMATIC MENOPAUSAL OR FEMALE CLIMACTERIC STATES: Primary | ICD-10-CM

## 2021-10-13 DIAGNOSIS — I10 ESSENTIAL HYPERTENSION: ICD-10-CM

## 2021-10-13 DIAGNOSIS — R07.2 PRECORDIAL PAIN: ICD-10-CM

## 2021-10-13 DIAGNOSIS — I25.42 DISSECTION OF CORONARY ARTERY: ICD-10-CM

## 2021-10-13 PROCEDURE — 93018 CV STRESS TEST I&R ONLY: CPT | Performed by: INTERNAL MEDICINE

## 2021-10-13 PROCEDURE — 93350 STRESS TTE ONLY: CPT | Mod: 26 | Performed by: INTERNAL MEDICINE

## 2021-10-13 PROCEDURE — 93016 CV STRESS TEST SUPVJ ONLY: CPT | Performed by: INTERNAL MEDICINE

## 2021-10-13 PROCEDURE — 93321 DOPPLER ECHO F-UP/LMTD STD: CPT | Mod: 26 | Performed by: INTERNAL MEDICINE

## 2021-10-13 PROCEDURE — 93325 DOPPLER ECHO COLOR FLOW MAPG: CPT | Mod: TC

## 2021-10-13 PROCEDURE — 93325 DOPPLER ECHO COLOR FLOW MAPG: CPT | Mod: 26 | Performed by: INTERNAL MEDICINE

## 2021-10-13 PROCEDURE — 93350 STRESS TTE ONLY: CPT | Mod: TC

## 2021-10-14 ENCOUNTER — LAB (OUTPATIENT)
Dept: LAB | Facility: CLINIC | Age: 61
End: 2021-10-14
Payer: COMMERCIAL

## 2021-10-14 DIAGNOSIS — N95.1 SYMPTOMATIC MENOPAUSAL OR FEMALE CLIMACTERIC STATES: ICD-10-CM

## 2021-10-14 DIAGNOSIS — Z20.822 ENCOUNTER FOR LABORATORY TESTING FOR COVID-19 VIRUS: ICD-10-CM

## 2021-10-14 LAB — PROGEST SERPL-MCNC: 0.4 NG/ML

## 2021-10-14 PROCEDURE — 84144 ASSAY OF PROGESTERONE: CPT

## 2021-10-14 PROCEDURE — U0003 INFECTIOUS AGENT DETECTION BY NUCLEIC ACID (DNA OR RNA); SEVERE ACUTE RESPIRATORY SYNDROME CORONAVIRUS 2 (SARS-COV-2) (CORONAVIRUS DISEASE [COVID-19]), AMPLIFIED PROBE TECHNIQUE, MAKING USE OF HIGH THROUGHPUT TECHNOLOGIES AS DESCRIBED BY CMS-2020-01-R: HCPCS

## 2021-10-14 PROCEDURE — 36415 COLL VENOUS BLD VENIPUNCTURE: CPT

## 2021-10-14 PROCEDURE — U0005 INFEC AGEN DETEC AMPLI PROBE: HCPCS

## 2021-10-15 LAB — SARS-COV-2 RNA RESP QL NAA+PROBE: NEGATIVE

## 2021-10-19 ENCOUNTER — TRANSFERRED RECORDS (OUTPATIENT)
Dept: HEALTH INFORMATION MANAGEMENT | Facility: CLINIC | Age: 61
End: 2021-10-19
Payer: COMMERCIAL

## 2021-10-21 ENCOUNTER — OFFICE VISIT (OUTPATIENT)
Dept: FAMILY MEDICINE | Facility: CLINIC | Age: 61
End: 2021-10-21
Payer: COMMERCIAL

## 2021-10-21 VITALS
TEMPERATURE: 97.8 F | HEIGHT: 62 IN | BODY MASS INDEX: 22.63 KG/M2 | SYSTOLIC BLOOD PRESSURE: 138 MMHG | OXYGEN SATURATION: 98 % | WEIGHT: 123 LBS | DIASTOLIC BLOOD PRESSURE: 80 MMHG | HEART RATE: 68 BPM

## 2021-10-21 DIAGNOSIS — F41.9 ANXIETY: ICD-10-CM

## 2021-10-21 DIAGNOSIS — I25.42 DISSECTION OF CORONARY ARTERY: ICD-10-CM

## 2021-10-21 DIAGNOSIS — F43.10 PTSD (POST-TRAUMATIC STRESS DISORDER): ICD-10-CM

## 2021-10-21 DIAGNOSIS — Z12.31 ENCOUNTER FOR SCREENING MAMMOGRAM FOR MALIGNANT NEOPLASM OF BREAST: Primary | ICD-10-CM

## 2021-10-21 PROCEDURE — 99213 OFFICE O/P EST LOW 20 MIN: CPT | Mod: 25 | Performed by: FAMILY MEDICINE

## 2021-10-21 RX ORDER — LORAZEPAM 0.5 MG/1
TABLET ORAL
Qty: 20 TABLET | Refills: 2 | Status: SHIPPED | OUTPATIENT
Start: 2021-10-21 | End: 2024-03-04

## 2021-10-21 ASSESSMENT — MIFFLIN-ST. JEOR: SCORE: 1071.17

## 2021-10-21 NOTE — PROGRESS NOTES
Assessment & Plan     Anxiety  Uses sparingly   - LORazepam (ATIVAN) 0.5 MG tablet; Take 1 at onset of panic attack. May repeat in 8 hours if needed. Max 20 tabs per month    PTSD (post-traumatic stress disorder)    - LORazepam (ATIVAN) 0.5 MG tablet; Take 1 at onset of panic attack. May repeat in 8 hours if needed. Max 20 tabs per month    Encounter for screening mammogram for malignant neoplasm of breast    - *MA Screening Digital Bilateral; Future    Dissection of coronary artery  No ivd   - STATIN NOT PRESCRIBED (INTENTIONAL); Please choose reason not prescribed from choices below.       FUTURE APPOINTMENTS:       - Follow-up for annual visit or as needed    Return in about 1 year (around 10/21/2022) for Routine Visit, med check.    Eliana Quinteros MD  [facility] IOANA Higuera is a 61 year old who presents for the following health issues     HPI     On a low food map diet.   SIBO - last November.   She was treated for sibo and she took medication and was immensly better   She has been on the low fod map diet   This has really helped her     Anxiety Follow-Up    How are you doing with your anxiety since your last visit? Improved     Are you having other symptoms that might be associated with anxiety? No    Have you had a significant life event? No     Are you feeling depressed? No    Do you have any concerns with your use of alcohol or other drugs? No  She is doing better with the anxiety   She had been feeling better from the incident last year  Social History     Tobacco Use     Smoking status: Former Smoker     Years: 10.00     Types: Cigarettes     Start date: 9/10/1972     Quit date: 1991     Years since quittin.0     Smokeless tobacco: Never Used   Substance Use Topics     Alcohol use: Yes     Comment: Occasionally     Drug use: No     PADMA-7 SCORE 3/9/2020 2020 2021   Total Score - - -   Total Score - 7 (mild anxiety) 7 (mild anxiety)   Total Score 6 7 7  "    PHQ 11/3/2015 3/9/2020   PHQ-9 Total Score 2 4   Q9: Thoughts of better off dead/self-harm past 2 weeks Not at all Not at all                 Review of Systems   Constitutional, HEENT, cardiovascular, pulmonary, GI, , musculoskeletal, neuro, skin, endocrine and psych systems are negative, except as otherwise noted.      Objective    /80   Pulse 68   Temp 97.8  F (36.6  C) (Tympanic)   Ht 1.575 m (5' 2\")   Wt 55.8 kg (123 lb)   SpO2 98%   BMI 22.50 kg/m    Body mass index is 22.5 kg/m .  Physical Exam   GENERAL: healthy, alert and no distress  EYES: Eyes grossly normal to inspection, PERRL and conjunctivae and sclerae normal  HENT: ear canals and TM's normal, nose and mouth without ulcers or lesions  NECK: no adenopathy, no asymmetry, masses, or scars and thyroid normal to palpation  RESP: lungs clear to auscultation - no rales, rhonchi or wheezes  BREAST: normal without masses, tenderness or nipple discharge and no palpable axillary masses or adenopathy  CV: regular rate and rhythm, normal S1 S2, no S3 or S4, no murmur, click or rub, no peripheral edema and peripheral pulses strong  ABDOMEN: soft, nontender, no hepatosplenomegaly, no masses and bowel sounds normal  MS: no gross musculoskeletal defects noted, no edema  SKIN: no suspicious lesions or rashes  NEURO: Normal strength and tone, mentation intact and speech normal  PSYCH: mentation appears normal, affect normal/bright    No results found for any visits on 10/21/21.    Eliana Quinteros M.D.          "

## 2021-10-25 ASSESSMENT — ANXIETY QUESTIONNAIRES
6. BECOMING EASILY ANNOYED OR IRRITABLE: NOT AT ALL
2. NOT BEING ABLE TO STOP OR CONTROL WORRYING: SEVERAL DAYS
GAD7 TOTAL SCORE: 3
IF YOU CHECKED OFF ANY PROBLEMS ON THIS QUESTIONNAIRE, HOW DIFFICULT HAVE THESE PROBLEMS MADE IT FOR YOU TO DO YOUR WORK, TAKE CARE OF THINGS AT HOME, OR GET ALONG WITH OTHER PEOPLE: NOT DIFFICULT AT ALL
1. FEELING NERVOUS, ANXIOUS, OR ON EDGE: SEVERAL DAYS
5. BEING SO RESTLESS THAT IT IS HARD TO SIT STILL: NOT AT ALL
7. FEELING AFRAID AS IF SOMETHING AWFUL MIGHT HAPPEN: NOT AT ALL
3. WORRYING TOO MUCH ABOUT DIFFERENT THINGS: SEVERAL DAYS

## 2021-10-25 ASSESSMENT — PATIENT HEALTH QUESTIONNAIRE - PHQ9
5. POOR APPETITE OR OVEREATING: NOT AT ALL
SUM OF ALL RESPONSES TO PHQ QUESTIONS 1-9: 1

## 2021-10-26 ASSESSMENT — ANXIETY QUESTIONNAIRES: GAD7 TOTAL SCORE: 3

## 2021-11-03 ENCOUNTER — LAB (OUTPATIENT)
Dept: LAB | Facility: CLINIC | Age: 61
End: 2021-11-03
Payer: COMMERCIAL

## 2021-11-03 DIAGNOSIS — I10 ESSENTIAL HYPERTENSION: ICD-10-CM

## 2021-11-03 LAB
ANION GAP SERPL CALCULATED.3IONS-SCNC: 2 MMOL/L (ref 3–14)
BUN SERPL-MCNC: 21 MG/DL (ref 7–30)
CALCIUM SERPL-MCNC: 9 MG/DL (ref 8.5–10.1)
CHLORIDE BLD-SCNC: 107 MMOL/L (ref 94–109)
CO2 SERPL-SCNC: 34 MMOL/L (ref 20–32)
CREAT SERPL-MCNC: 0.89 MG/DL (ref 0.52–1.04)
GFR SERPL CREATININE-BSD FRML MDRD: 70 ML/MIN/1.73M2
GLUCOSE BLD-MCNC: 88 MG/DL (ref 70–99)
POTASSIUM BLD-SCNC: 3.5 MMOL/L (ref 3.4–5.3)
SODIUM SERPL-SCNC: 143 MMOL/L (ref 133–144)

## 2021-11-03 PROCEDURE — 80048 BASIC METABOLIC PNL TOTAL CA: CPT

## 2021-11-03 PROCEDURE — 36415 COLL VENOUS BLD VENIPUNCTURE: CPT

## 2021-11-04 ENCOUNTER — HOSPITAL ENCOUNTER (OUTPATIENT)
Dept: MAMMOGRAPHY | Facility: CLINIC | Age: 61
Discharge: HOME | End: 2021-11-04
Attending: FAMILY MEDICINE | Admitting: FAMILY MEDICINE
Payer: COMMERCIAL

## 2021-11-04 DIAGNOSIS — Z12.31 ENCOUNTER FOR SCREENING MAMMOGRAM FOR MALIGNANT NEOPLASM OF BREAST: ICD-10-CM

## 2021-11-04 PROCEDURE — 77067 SCR MAMMO BI INCL CAD: CPT

## 2021-11-26 DIAGNOSIS — Z11.59 ENCOUNTER FOR SCREENING FOR OTHER VIRAL DISEASES: ICD-10-CM

## 2021-12-01 ENCOUNTER — TRANSFERRED RECORDS (OUTPATIENT)
Dept: MULTI SPECIALTY CLINIC | Facility: CLINIC | Age: 61
End: 2021-12-01

## 2021-12-01 LAB — PAP SMEAR - HIM PATIENT REPORTED: NEGATIVE

## 2021-12-15 ASSESSMENT — MIFFLIN-ST. JEOR: SCORE: 1057.57

## 2021-12-16 ENCOUNTER — LAB (OUTPATIENT)
Dept: LAB | Facility: CLINIC | Age: 61
End: 2021-12-16
Attending: STUDENT IN AN ORGANIZED HEALTH CARE EDUCATION/TRAINING PROGRAM
Payer: COMMERCIAL

## 2021-12-16 DIAGNOSIS — Z11.59 ENCOUNTER FOR SCREENING FOR OTHER VIRAL DISEASES: ICD-10-CM

## 2021-12-16 PROCEDURE — U0005 INFEC AGEN DETEC AMPLI PROBE: HCPCS

## 2021-12-16 PROCEDURE — U0003 INFECTIOUS AGENT DETECTION BY NUCLEIC ACID (DNA OR RNA); SEVERE ACUTE RESPIRATORY SYNDROME CORONAVIRUS 2 (SARS-COV-2) (CORONAVIRUS DISEASE [COVID-19]), AMPLIFIED PROBE TECHNIQUE, MAKING USE OF HIGH THROUGHPUT TECHNOLOGIES AS DESCRIBED BY CMS-2020-01-R: HCPCS

## 2021-12-17 ENCOUNTER — ANESTHESIA EVENT (OUTPATIENT)
Dept: SURGERY | Facility: AMBULATORY SURGERY CENTER | Age: 61
End: 2021-12-17
Payer: COMMERCIAL

## 2021-12-17 LAB — SARS-COV-2 RNA RESP QL NAA+PROBE: NEGATIVE

## 2021-12-20 ENCOUNTER — HOSPITAL ENCOUNTER (OUTPATIENT)
Facility: AMBULATORY SURGERY CENTER | Age: 61
End: 2021-12-20
Attending: STUDENT IN AN ORGANIZED HEALTH CARE EDUCATION/TRAINING PROGRAM
Payer: COMMERCIAL

## 2021-12-20 ENCOUNTER — ANESTHESIA (OUTPATIENT)
Dept: SURGERY | Facility: AMBULATORY SURGERY CENTER | Age: 61
End: 2021-12-20
Payer: COMMERCIAL

## 2021-12-20 VITALS
DIASTOLIC BLOOD PRESSURE: 57 MMHG | OXYGEN SATURATION: 95 % | BODY MASS INDEX: 22.08 KG/M2 | HEIGHT: 62 IN | TEMPERATURE: 97.4 F | SYSTOLIC BLOOD PRESSURE: 119 MMHG | RESPIRATION RATE: 16 BRPM | HEART RATE: 60 BPM | WEIGHT: 120 LBS

## 2021-12-20 DIAGNOSIS — N95.0 PMB (POSTMENOPAUSAL BLEEDING): ICD-10-CM

## 2021-12-20 RX ORDER — DEXAMETHASONE SODIUM PHOSPHATE 4 MG/ML
INJECTION, SOLUTION INTRA-ARTICULAR; INTRALESIONAL; INTRAMUSCULAR; INTRAVENOUS; SOFT TISSUE PRN
Status: DISCONTINUED | OUTPATIENT
Start: 2021-12-20 | End: 2021-12-20

## 2021-12-20 RX ORDER — ACETAMINOPHEN 325 MG/1
975 TABLET ORAL EVERY 6 HOURS PRN
Qty: 50 TABLET | Refills: 0 | Status: SHIPPED | OUTPATIENT
Start: 2021-12-20 | End: 2023-01-25

## 2021-12-20 RX ORDER — SODIUM CHLORIDE, SODIUM LACTATE, POTASSIUM CHLORIDE, CALCIUM CHLORIDE 600; 310; 30; 20 MG/100ML; MG/100ML; MG/100ML; MG/100ML
INJECTION, SOLUTION INTRAVENOUS CONTINUOUS
Status: DISCONTINUED | OUTPATIENT
Start: 2021-12-20 | End: 2021-12-21 | Stop reason: HOSPADM

## 2021-12-20 RX ORDER — LABETALOL 20 MG/4 ML (5 MG/ML) INTRAVENOUS SYRINGE
5 EVERY 10 MIN PRN
Status: DISCONTINUED | OUTPATIENT
Start: 2021-12-20 | End: 2021-12-21 | Stop reason: HOSPADM

## 2021-12-20 RX ORDER — MISOPROSTOL 200 UG/1
TABLET ORAL
COMMUNITY
Start: 2021-11-22 | End: 2022-05-16

## 2021-12-20 RX ORDER — HYDROMORPHONE HCL IN WATER/PF 6 MG/30 ML
0.2 PATIENT CONTROLLED ANALGESIA SYRINGE INTRAVENOUS EVERY 5 MIN PRN
Status: DISCONTINUED | OUTPATIENT
Start: 2021-12-20 | End: 2021-12-21 | Stop reason: HOSPADM

## 2021-12-20 RX ORDER — FENTANYL CITRATE 50 UG/ML
INJECTION, SOLUTION INTRAMUSCULAR; INTRAVENOUS PRN
Status: DISCONTINUED | OUTPATIENT
Start: 2021-12-20 | End: 2021-12-20

## 2021-12-20 RX ORDER — IBUPROFEN 800 MG/1
800 TABLET, FILM COATED ORAL EVERY 6 HOURS PRN
Qty: 30 TABLET | Refills: 0 | Status: SHIPPED | OUTPATIENT
Start: 2021-12-20 | End: 2022-05-16

## 2021-12-20 RX ORDER — OXYCODONE HYDROCHLORIDE 5 MG/1
5 TABLET ORAL EVERY 4 HOURS PRN
Status: DISCONTINUED | OUTPATIENT
Start: 2021-12-20 | End: 2021-12-21 | Stop reason: HOSPADM

## 2021-12-20 RX ORDER — ACETAMINOPHEN 325 MG/1
975 TABLET ORAL ONCE
Status: DISCONTINUED | OUTPATIENT
Start: 2021-12-20 | End: 2021-12-21 | Stop reason: HOSPADM

## 2021-12-20 RX ORDER — ACETAMINOPHEN 325 MG/1
975 TABLET ORAL ONCE
Status: COMPLETED | OUTPATIENT
Start: 2021-12-20 | End: 2021-12-20

## 2021-12-20 RX ORDER — PROPOFOL 10 MG/ML
INJECTION, EMULSION INTRAVENOUS CONTINUOUS PRN
Status: DISCONTINUED | OUTPATIENT
Start: 2021-12-20 | End: 2021-12-20

## 2021-12-20 RX ORDER — IBUPROFEN 800 MG/1
800 TABLET, FILM COATED ORAL ONCE
Status: DISCONTINUED | OUTPATIENT
Start: 2021-12-20 | End: 2021-12-21 | Stop reason: HOSPADM

## 2021-12-20 RX ORDER — ONDANSETRON 2 MG/ML
INJECTION INTRAMUSCULAR; INTRAVENOUS PRN
Status: DISCONTINUED | OUTPATIENT
Start: 2021-12-20 | End: 2021-12-20

## 2021-12-20 RX ORDER — ONDANSETRON 4 MG/1
4 TABLET, ORALLY DISINTEGRATING ORAL EVERY 30 MIN PRN
Status: DISCONTINUED | OUTPATIENT
Start: 2021-12-20 | End: 2021-12-21 | Stop reason: HOSPADM

## 2021-12-20 RX ORDER — KETAMINE HYDROCHLORIDE 50 MG/ML
INJECTION, SOLUTION INTRAMUSCULAR; INTRAVENOUS PRN
Status: DISCONTINUED | OUTPATIENT
Start: 2021-12-20 | End: 2021-12-20

## 2021-12-20 RX ORDER — KETOROLAC TROMETHAMINE 30 MG/ML
INJECTION, SOLUTION INTRAMUSCULAR; INTRAVENOUS PRN
Status: DISCONTINUED | OUTPATIENT
Start: 2021-12-20 | End: 2021-12-20

## 2021-12-20 RX ORDER — FENTANYL CITRATE 50 UG/ML
25 INJECTION, SOLUTION INTRAMUSCULAR; INTRAVENOUS
Status: DISCONTINUED | OUTPATIENT
Start: 2021-12-20 | End: 2021-12-21 | Stop reason: HOSPADM

## 2021-12-20 RX ORDER — LIDOCAINE 40 MG/G
CREAM TOPICAL
Status: DISCONTINUED | OUTPATIENT
Start: 2021-12-20 | End: 2021-12-21 | Stop reason: HOSPADM

## 2021-12-20 RX ORDER — ONDANSETRON 2 MG/ML
4 INJECTION INTRAMUSCULAR; INTRAVENOUS EVERY 30 MIN PRN
Status: DISCONTINUED | OUTPATIENT
Start: 2021-12-20 | End: 2021-12-21 | Stop reason: HOSPADM

## 2021-12-20 RX ORDER — PROPOFOL 10 MG/ML
INJECTION, EMULSION INTRAVENOUS PRN
Status: DISCONTINUED | OUTPATIENT
Start: 2021-12-20 | End: 2021-12-20

## 2021-12-20 RX ORDER — FENTANYL CITRATE 50 UG/ML
25-50 INJECTION, SOLUTION INTRAMUSCULAR; INTRAVENOUS EVERY 5 MIN PRN
Status: DISCONTINUED | OUTPATIENT
Start: 2021-12-20 | End: 2021-12-21 | Stop reason: HOSPADM

## 2021-12-20 RX ADMIN — PROPOFOL 100 MCG/KG/MIN: 10 INJECTION, EMULSION INTRAVENOUS at 15:13

## 2021-12-20 RX ADMIN — SODIUM CHLORIDE, SODIUM LACTATE, POTASSIUM CHLORIDE, CALCIUM CHLORIDE: 600; 310; 30; 20 INJECTION, SOLUTION INTRAVENOUS at 15:11

## 2021-12-20 RX ADMIN — FENTANYL CITRATE 50 MCG: 50 INJECTION, SOLUTION INTRAMUSCULAR; INTRAVENOUS at 15:13

## 2021-12-20 RX ADMIN — KETOROLAC TROMETHAMINE 15 MG: 30 INJECTION, SOLUTION INTRAMUSCULAR; INTRAVENOUS at 15:54

## 2021-12-20 RX ADMIN — KETAMINE HYDROCHLORIDE 20 MG: 50 INJECTION, SOLUTION INTRAMUSCULAR; INTRAVENOUS at 15:20

## 2021-12-20 RX ADMIN — ACETAMINOPHEN 975 MG: 325 TABLET ORAL at 13:20

## 2021-12-20 RX ADMIN — ONDANSETRON 4 MG: 2 INJECTION INTRAMUSCULAR; INTRAVENOUS at 15:24

## 2021-12-20 RX ADMIN — PROPOFOL 40 MG: 10 INJECTION, EMULSION INTRAVENOUS at 15:13

## 2021-12-20 RX ADMIN — DEXAMETHASONE SODIUM PHOSPHATE 4 MG: 4 INJECTION, SOLUTION INTRA-ARTICULAR; INTRALESIONAL; INTRAMUSCULAR; INTRAVENOUS; SOFT TISSUE at 15:24

## 2021-12-20 NOTE — ANESTHESIA POSTPROCEDURE EVALUATION
Patient: Davida Mo    Procedure: Procedure(s):  HYSTEROSCOPY, POLYPECTOMY       Diagnosis:PMB (postmenopausal bleeding) [N95.0]  Diagnosis Additional Information: No value filed.    Anesthesia Type:  MAC    Note:  Disposition: Outpatient   Postop Pain Control: Uneventful            Sign Out: Well controlled pain   PONV: No   Neuro/Psych: Uneventful            Sign Out: Acceptable/Baseline neuro status   Airway/Respiratory: Uneventful            Sign Out: Acceptable/Baseline resp. status   CV/Hemodynamics: Uneventful            Sign Out: Acceptable CV status; No obvious hypovolemia; No obvious fluid overload   Other NRE: NONE   DID A NON-ROUTINE EVENT OCCUR? No           Last vitals:  Vitals Value Taken Time   /60 12/20/21 1615   Temp 97.4  F (36.3  C) 12/20/21 1602   Pulse 64 12/20/21 1628   Resp 16 12/20/21 1602   SpO2 94 % 12/20/21 1628   Vitals shown include unvalidated device data.    Electronically Signed By: En Swain MD  December 20, 2021  4:31 PM

## 2021-12-20 NOTE — ANESTHESIA PREPROCEDURE EVALUATION
Anesthesia Pre-Procedure Evaluation    Patient: Davida Mo   MRN: [id number] : 1960        Preoperative Diagnosis: PMB (postmenopausal bleeding) [N95.0]    Procedure : Procedure(s):  HYSTEROSCOPY, POLYPECTOMY          Past Medical History:   Diagnosis Date     Arthritis      Depression     on meds from 96 to 06. no symptoms have returned     Hypertension      IBS (irritable bowel syndrome)      Motion sickness      Need for prophylactic hormone replacement therapy (postmenopausal)      NSTEMI (non-ST elevated myocardial infarction) (H) ,2014    see care everywhere     Prinzmetal angina (H)     first heart attack. prinzmetal angina diagnosis     Thyroid disease       Past Surgical History:   Procedure Laterality Date     BUNIONECTOMY RT/LT       DILATION AND CURETTAGE       TUBAL LIGATION       TUBAL LIGATION        Allergies   Allergen Reactions     Kiwi Anaphylaxis     Milk Protein Extract Other (See Comments)     Contraindicated due to Thyroid, Hashimoto     Egg White (Diagnostic) Other (See Comments)     tested     Lactose Other (See Comments)     Contraindicated due to Thyroid, Hashimoto     Magnesium GI Disturbance     Other (Do Not Use) Anxiety, Other (See Comments), Diarrhea, Cramps and GI Disturbance     Hydroxizine name of medication     Remeron [Mirtazapine] Rash     Patient states that medication made her extremly tired, felt like she could not function until late afternoon.  Also noted having rash all over body      Social History     Tobacco Use     Smoking status: Former Smoker     Years: 10.00     Types: Cigarettes     Start date: 9/10/1972     Quit date: 1991     Years since quittin.2     Smokeless tobacco: Never Used   Substance Use Topics     Alcohol use: Yes     Comment: Occasionally      Wt Readings from Last 1 Encounters:   12/15/21 54.4 kg (120 lb)        Anesthesia Evaluation            ROS/MED HX  ENT/Pulmonary:  - neg pulmonary ROS     Neurologic:  - neg  neurologic ROS     Cardiovascular:     (+) hypertension--CAD --- (-) angina and angina   METS/Exercise Tolerance:     Hematologic:  - neg hematologic  ROS     Musculoskeletal:  - neg musculoskeletal ROS     GI/Hepatic:  - neg GI/hepatic ROS     Renal/Genitourinary:  - neg Renal ROS     Endo:     (+) thyroid problem,  Thyroid disease - Other,     Psychiatric/Substance Use:  - neg psychiatric ROS     Infectious Disease:  - neg infectious disease ROS     Malignancy:       Other:            Physical Exam    Airway        Mallampati: III   TM distance: > 3 FB   Neck ROM: full     Respiratory Devices and Support         Dental  no notable dental history         Cardiovascular   cardiovascular exam normal          Pulmonary   pulmonary exam normal            Other findings:     Stress Echo 10/2021:    Procedure  Stress Echo Complete.  ______________________________________________________________________________  Interpretation Summary  1. Normal stress echocardiogram with no evidence of stress-induced ischemia.  2. This was a normal stress EKG with no evidence of stress-induced ischemia.  3. Normal left ventricular function and wall motion at rest and post-stress.  The visual resting ejection fraction is estimated at 55-60%.  4. No hemodynamically significant valvular abnormalities on 2D or color flow  Imaging.      OUTSIDE LABS:  CBC:   Lab Results   Component Value Date    WBC 5.5 10/11/2021    WBC 4.8 11/04/2020    HGB 14.1 10/11/2021    HGB 14.0 11/04/2020    HCT 42.9 10/11/2021    HCT 43.7 11/04/2020     10/11/2021     11/04/2020     BMP:   Lab Results   Component Value Date     11/03/2021     10/11/2021    POTASSIUM 3.5 11/03/2021    POTASSIUM 4.0 10/11/2021    CHLORIDE 107 11/03/2021    CHLORIDE 108 10/11/2021    CO2 34 (H) 11/03/2021    CO2 30 10/11/2021    BUN 21 11/03/2021    BUN 23 10/11/2021    CR 0.89 11/03/2021    CR 1.31 (H) 10/11/2021    GLC 88 11/03/2021    GLC 87 10/11/2021      COAGS: No results found for: PTT, INR, FIBR  POC: No results found for: BGM, HCG, HCGS  HEPATIC:   Lab Results   Component Value Date    ALBUMIN 3.7 11/04/2020    PROTTOTAL 6.9 11/04/2020    ALT 16 11/04/2020    AST 15 11/04/2020    ALKPHOS 81 11/04/2020    BILITOTAL 0.3 11/04/2020     OTHER:   Lab Results   Component Value Date    LACT 0.7 01/21/2019    MICHAEL 9.0 11/03/2021    MAG 2.2 09/20/2011    MAG 2.2 09/20/2011    LIPASE 88 11/04/2020    TSH 2.96 10/11/2021    T4 0.76 07/08/2021    T3 125 11/04/2020       Anesthesia Plan    ASA Status:  3   NPO Status:  NPO Appropriate    Anesthesia Type: MAC.      Maintenance: TIVA.        Consents    Anesthesia Plan(s) and associated risks, benefits, and realistic alternatives discussed. Questions answered and patient/representative(s) expressed understanding.    - Discussed:     - Discussed with:  Patient         Postoperative Care    Pain management: Multi-modal analgesia.   PONV prophylaxis: Ondansetron (or other 5HT-3), Dexamethasone or Solumedrol     Comments:    Other Comments:     Ketamine  Toradol if ok with surgeon            Lyndsey Marie MD

## 2021-12-20 NOTE — DISCHARGE INSTRUCTIONS
You received 975 mg of acetaminophen (Tylenol) at 1:20 PM. Do not exceed 4,000 mg of acetaminophen during a 24 hour period and keep in mind that acetaminophen can also be found in many over-the-counter cold medications as well as narcotics that may be given for pain.     If you have any questions or concerns regarding your procedure, please contact Dr. Mcguire, her office number is [phone].    Discharge Instructions for Patients Undergoing D & C or Laparoscopy, Hysteroscopy, LEEP or Cold Knife Cone Biopsy     ACTIVITY:  You may resume normal activities as your abdominal discomfort disappears.  You may expect some discomfort under the ribs and shoulder area for the first 24 hours.  In nearly all cases, this will disappear shortly after the first day.  It is certainly permissible to climb stairs, and do ordinary, quiet activities.  More vigorous activities such as sports, and work may be resumed in 48 - 72 hours as seems to befit your situation.  You should not bathe for one week, but you may shower any time.     OFFICE VISIT:  Please call a day or two after your surgery to make an appointment in 1 - 2 weeks to discuss the results of your surgery and have your check-up.       When to call your healthcare provider:     VAGINAL DISCHARGE:  You may have some bloody vaginal discharge for as long as one week.  Pads may be used any time.  Avoid placing anything in your vagina (tampons, douches, intercourse) for two weeks after Cone biopsy or LEEP (one week after hysteroscopy or D & C).     TEMPERATURE:  If you develop a temperature elevation of 101 degrees or higher, please call immediately or go to the Emergency Room if after office hours.     RESTRICTIONS:  Due to the effects of general anesthesia, please do not drive a car, drink alcoholic beverages, or operate complex machinery in the first 24 hours following surgery.     If you develop any vomiting, or abdominal pain that is worsening over time, or any  vaginal bleeding heavier than usual menstrual period (more than one pad change each hour), please contact Dr. Mcguire, her office number is [phone].    Discharge Instructions: After Your Surgery    You ve just had surgery. During surgery, you were given medicine called anesthesia to keep you relaxed and free of pain. After surgery, you may have some pain or nausea. This is common. Here are some tips for feeling better and getting well after surgery.    Going home    Your healthcare provider will show you how to take care of yourself when you go home. He or she will also answer your questions. Have an adult family member or friend drive you home. For the first 24 hours after your surgery:      Don't drive or use heavy equipment.    Don't make important decisions or sign legal papers.    Don't drink alcohol.    Have someone stay with you for the next 24 hours. He or she can watch for problems and help keep you safe.  Be sure to go to all follow-up visits with your healthcare provider. And rest after your surgery for as long as your healthcare provider tells you to.    Coping with pain    *Pain after surgery in normal and expected*    If you have pain after surgery, pain medicine will help you feel better. Take it as told, before pain becomes severe. Also, ask your healthcare provider or pharmacist about other ways to control pain. This might be with heat, ice, or relaxation. And follow any other instructions your surgeon or nurse gives you.    Tips for taking pain medicine    To get the best relief possible, remember these points:      Pain medicines can upset your stomach. Taking them with a little food may help.    Most pain relievers taken by mouth need at least 20 to 30 minutes to start to work.    Don't wait till your pain becomes severe before you take your medicine. Try to time your medicine so that you can take it before starting an activity. This might be before you get dressed, go for a walk, or  sit down for dinner.    Constipation is a common side effect of pain medicines. You can take medicines such as laxatives (Miralax) or stool softeners to help ease constipation. Drinking lots of fluids and eating foods such as fruits and vegetables that are high in fiber can also help.    Drinking alcohol and taking pain medicine can cause dizziness and slow your breathing. It can even be deadly. Don't drink alcohol while taking pain medicine.    Pain medicine can make you react more slowly to things. Don't drive or run machinery while taking pain medicine.  Your healthcare provider may tell you to take acetaminophen to help ease your pain. Ask him or her how much you are supposed to take each day. Acetaminophen or other pain relievers may interact with your prescription medicines or other over-the-counter (OTC) medicines. Some prescription medicines have acetaminophen and other ingredients. Using both prescription and OTC acetaminophen for pain can cause you to overdose. Read the labels on your OTC medicines with care. This will help you to clearly know the list of ingredients, how much to take, and any warnings. It may also help you not take too much acetaminophen. If you have questions or don't understand the information, ask your pharmacist or healthcare provider to explain it to you before you take the OTC medicine.    Managing nausea    Some people have an upset stomach after surgery. This is often because of anesthesia, pain, or pain medicine, or the stress of surgery. These tips will help you handle nausea and eat healthy foods as you get better. If you were on a special food plan before surgery, ask your healthcare provider if you should follow it while you get better. These tips may help:      Don't push yourself to eat. Your body will tell you when to eat and how much.    Start off with clear liquids and soup. They are easier to digest.    Next try semi-solid foods, such as mashed potatoes, applesauce, and  gelatin, as you feel ready.    Slowly move to solid foods. Don t eat fatty, rich, or spicy foods at first.    Don't force yourself to have 3 large meals a day. Instead eat smaller amounts more often.    Take pain medicines with a small amount of solid food, such as crackers or toast, to prevent nausea.    If you have obstructive sleep apnea    You were given anesthesia medicine during surgery to keep you comfortable and free of pain. After surgery, you may have more apnea spells because of this medicine and other medicines you were given. The spells may last longer than usual.   At home:      Keep using the continuous positive airway pressure (CPAP) device when you sleep. Unless your healthcare provider tells you not to, use it when you sleep, day or night. CPAP is a common device used to treat obstructive sleep apnea.    Talk with your provider before taking any pain medicine, muscle relaxants, or sedatives. Your provider will tell you about the possible dangers of taking these medicines.

## 2021-12-20 NOTE — ANESTHESIA CARE TRANSFER NOTE
Patient: Davida Mo    Procedure: Procedure(s):  HYSTEROSCOPY, POLYPECTOMY       Diagnosis: PMB (postmenopausal bleeding) [N95.0]  Diagnosis Additional Information: No value filed.    Anesthesia Type:   MAC     Note:    Oropharynx: oropharynx clear of all foreign objects  Level of Consciousness: drowsy  Oxygen Supplementation: face mask  Level of Supplemental Oxygen (L/min / FiO2): 10  Independent Airway: airway patency satisfactory and stable  Dentition: dentition unchanged  Vital Signs Stable: post-procedure vital signs reviewed and stable  Report to RN Given: handoff report given  Patient transferred to: Phase II    Handoff Report: Identifed the Patient, Identified the Reponsible Provider, Reviewed the pertinent medical history, Discussed the surgical course, Reviewed Intra-OP anesthesia mangement and issues during anesthesia, Set expectations for post-procedure period and Allowed opportunity for questions and acknowledgement of understanding      Vitals:  Vitals Value Taken Time   /67 12/20/21 1602   Temp 97.4  F (36.3  C) 12/20/21 1602   Pulse 66 12/20/21 1601   Resp 16 12/20/21 1602   SpO2 100 % 12/20/21 1602   Vitals shown include unvalidated device data.    Electronically Signed By: JAYY Farrell CRNA  December 20, 2021  4:04 PM

## 2021-12-20 NOTE — PROGRESS NOTES
"Preprocedure attestation    Patient is a 60yo with PMB and suspected uterine polyp presenting for hysteroscopy, polypectomy, D&C. No significant changes to PMH/PSH.     PE  BP (!) 141/71   Pulse 64   Temp 97.9  F (36.6  C) (Temporal)   Resp 16   Ht 1.575 m (5' 2\")   Wt 54.4 kg (120 lb)   SpO2 98%   BMI 21.95 kg/m    Gen: well appearing  Abd: soft, NT, ND  Resp: CTA b/l  CV: RRR    Risks/benefits reviewed and informed consent obtained. NPO x8h, covid19 neg.     Sebastian Mcguire MD    "

## 2021-12-20 NOTE — OP NOTE
Operative Report    Patient: Davida Mo    MRN: [id number]    CSN: [id number]    12/20/2021    Procedure date: 12/20/2021    Preoperative Diagnosis:    1. 62yo with postmenopausal bleeding    Postoperative Diagnosis: Same    Procedure(s): Procedure(s):  HYSTEROSCOPY, POLYPECTOMY    Attending Surgeon: Sebastian Mcguire MD    Assistant(s): Circulator: Krystle Villanueva RN  Relief Circulator: Supriya Gold RN  Relief Scrub: KEENAN CHOPRA  Scrub Person: Krystle Mirza    Anesthesia: Monitor Anesthesia Care    EBL: 20mL    Fluids: see anesthesia record     UOP: none    Fluid deficit: 40ml    Description of findings:  EUA revealed normal size anteverted uterus, no adnexal masses.  Hysteroscopy demonstrated normal ostia bilaterally, fundal 5mm polyp and right lateral 3mm polyp, otherwise normal appearing endometrium.     Specimens submitted:  ID Type Source Tests Collected by Time Destination   1 : endometrial polyp and curettings Tissue Endometrium SURGICAL PATHOLOGY EXAM Sebastian Mcguire MD 12/20/2021  3:48 PM        Disposition: stable to pacu      Complications: none    Description of Operation:    The patient was explained the procedure. All risks, benefits, alternatives were discussed. The consent form was signed with a witness present.    The patient was taken to the OR where SCDs were placed and turned on. MAC was induced without difficulty. The patient was then placed in dorsal lithotomy position using En stirrups. She was examined for the above noted findings. Then, the patient was prepped and draped in usual sterile fashion.    The bladder was drained with a straight catheter. Espinoza retractors were used to identify the cervix. Singled toothed tenaculum was placed on the anterior lip of the cervix. The cervix was dilated to accomodate an operative hysteroscope sequentially using Nunu dilators under ultrasound guidance given h/o suspected perforation. An operative hysteroscope was carefully  introduced through the cervix to the uterus under direct visualization. Sterile saline was used a medium for visualization of the uterine cavity. Both ostia were visualized and appeared normal. A 5mm fundal polyp was noted, as well as a right lateral 3mm polyp. Both were resected entirely using a hysteroscopic morcellator. No bleeding was noted from the site. The hysteroscope was then removed from the uterus and gentle curettage was performed. Next the tenaculum was removed from the cervix and the sites were found to be hemostatic. All instruments were then removed from the vagina.    Ins and outs were noted. All needle, instrument, and lap sponge count correct x 2. The procedure was overall without complication. The patient was repositioned to dorsal supine position, extubated without event and taken to the recovery room in stable condition.    Sebastian Mcguire MD

## 2022-02-02 ENCOUNTER — LAB (OUTPATIENT)
Dept: LAB | Facility: CLINIC | Age: 62
End: 2022-02-02
Payer: COMMERCIAL

## 2022-02-02 DIAGNOSIS — E03.9 MYXEDEMA HEART DISEASE: Primary | ICD-10-CM

## 2022-02-02 DIAGNOSIS — Z13.220 SCREENING FOR HYPERLIPIDEMIA: ICD-10-CM

## 2022-02-02 DIAGNOSIS — I25.42 DISSECTION OF CORONARY ARTERY: ICD-10-CM

## 2022-02-02 DIAGNOSIS — I51.9 MYXEDEMA HEART DISEASE: Primary | ICD-10-CM

## 2022-02-02 LAB
T3FREE SERPL-MCNC: 3.2 PG/ML (ref 2.3–4.2)
T4 FREE SERPL-MCNC: 0.84 NG/DL (ref 0.76–1.46)
TSH SERPL DL<=0.005 MIU/L-ACNC: 2.87 MU/L (ref 0.4–4)

## 2022-02-02 PROCEDURE — 84443 ASSAY THYROID STIM HORMONE: CPT

## 2022-02-02 PROCEDURE — 84481 FREE ASSAY (FT-3): CPT

## 2022-02-02 PROCEDURE — 36415 COLL VENOUS BLD VENIPUNCTURE: CPT

## 2022-02-02 PROCEDURE — 84439 ASSAY OF FREE THYROXINE: CPT

## 2022-05-16 ENCOUNTER — OFFICE VISIT (OUTPATIENT)
Dept: FAMILY MEDICINE | Facility: CLINIC | Age: 62
End: 2022-05-16
Payer: COMMERCIAL

## 2022-05-16 VITALS
TEMPERATURE: 99.1 F | SYSTOLIC BLOOD PRESSURE: 110 MMHG | HEART RATE: 52 BPM | BODY MASS INDEX: 22.63 KG/M2 | HEIGHT: 62 IN | WEIGHT: 123 LBS | DIASTOLIC BLOOD PRESSURE: 72 MMHG | RESPIRATION RATE: 16 BRPM | OXYGEN SATURATION: 97 %

## 2022-05-16 DIAGNOSIS — I20.1 PRINZMETAL ANGINA (H): ICD-10-CM

## 2022-05-16 DIAGNOSIS — Z20.822 SUSPECTED 2019 NOVEL CORONAVIRUS INFECTION: ICD-10-CM

## 2022-05-16 DIAGNOSIS — J06.9 VIRAL URI WITH COUGH: Primary | ICD-10-CM

## 2022-05-16 DIAGNOSIS — R05.9 COUGH: ICD-10-CM

## 2022-05-16 LAB
FLUAV AG SPEC QL IA: NEGATIVE
FLUBV AG SPEC QL IA: NEGATIVE

## 2022-05-16 PROCEDURE — 87804 INFLUENZA ASSAY W/OPTIC: CPT | Performed by: FAMILY MEDICINE

## 2022-05-16 PROCEDURE — U0005 INFEC AGEN DETEC AMPLI PROBE: HCPCS | Performed by: FAMILY MEDICINE

## 2022-05-16 PROCEDURE — 99213 OFFICE O/P EST LOW 20 MIN: CPT | Performed by: FAMILY MEDICINE

## 2022-05-16 PROCEDURE — U0003 INFECTIOUS AGENT DETECTION BY NUCLEIC ACID (DNA OR RNA); SEVERE ACUTE RESPIRATORY SYNDROME CORONAVIRUS 2 (SARS-COV-2) (CORONAVIRUS DISEASE [COVID-19]), AMPLIFIED PROBE TECHNIQUE, MAKING USE OF HIGH THROUGHPUT TECHNOLOGIES AS DESCRIBED BY CMS-2020-01-R: HCPCS | Performed by: FAMILY MEDICINE

## 2022-05-16 RX ORDER — ALBUTEROL SULFATE 90 UG/1
2 AEROSOL, METERED RESPIRATORY (INHALATION) 4 TIMES DAILY
Qty: 18 G | Refills: 0 | Status: SHIPPED | OUTPATIENT
Start: 2022-05-16 | End: 2022-06-07

## 2022-05-16 RX ORDER — ESTRADIOL 0.05 MG/D
PATCH TRANSDERMAL
COMMUNITY
Start: 2022-04-10

## 2022-05-16 RX ORDER — PROGESTERONE 100 MG/1
CAPSULE ORAL
COMMUNITY
Start: 2022-04-25

## 2022-05-16 RX ORDER — BENZONATATE 200 MG/1
200 CAPSULE ORAL 3 TIMES DAILY PRN
Qty: 30 CAPSULE | Refills: 0 | Status: SHIPPED | OUTPATIENT
Start: 2022-05-16 | End: 2022-06-10

## 2022-05-16 ASSESSMENT — PAIN SCALES - GENERAL: PAINLEVEL: MILD PAIN (2)

## 2022-05-16 NOTE — PROGRESS NOTES
Assessment & Plan     Viral URI with cough   influenza negative  COVID test pending    Symptomatic treatment recommended  If symptoms persist 10-14 days without improvement, consider coverage for atypicals    - albuterol (PROAIR HFA/PROVENTIL HFA/VENTOLIN HFA) 108 (90 Base) MCG/ACT inhaler; Inhale 2 puffs into the lungs 4 times daily  - benzonatate (TESSALON) 200 MG capsule; Take 1 capsule (200 mg) by mouth 3 times daily as needed for cough    Cough     - Influenza A & B Antigen - Clinic Collect    Prinzmetal angina (H)   past history of this, no current symptoms.  Known issue that I take into account for their medical decisions, no current exacerbations or new concerns            No follow-ups on file.    Yareli Casiano MD  [facility]    Gary Higuera is a 61 year old who presents for the following health issues     History of Present Illness       Reason for visit:  Upper respiratory issue  Symptom onset:  3-7 days ago  Symptoms include:  Cough head chest congestion very low energy mucus in eyes fever but not today shortness of breath  Symptom intensity:  Moderate  Symptom progression:  Staying the same  Had these symptoms before:  No  What makes it worse:  Activity  What makes it better:  Sleep    She eats 2-3 servings of fruits and vegetables daily.She consumes 0 sweetened beverage(s) daily.She exercises with enough effort to increase her heart rate 30 to 60 minutes per day.  She exercises with enough effort to increase her heart rate 6 days per week.   She is taking medications regularly.       Acute Illness  Acute illness concerns: Cough, congestion, eye discharge, fevers. X 2 weeks noticed a swollen lymph node that went a way and then woke up with sore throat and now all the new symptoms appeared  Onset/Duration: 7 days  Symptoms:  Fever: YES- Tm 100 on Saturday no current fevers  Chills/Sweats: YES  Headache (location?): YES  Sinus Pressure: YES  Conjunctivitis:  YES  Ear  "Pain: no  Rhinorrhea: YES  Congestion: YES  Sore Throat: YES- tenderness  Cough: YES-productive of yellow sputum, with shortness of breath  Wheeze: no  Decreased Appetite: no  Nausea: no  Vomiting: no  Diarrhea: no  Dysuria/Freq.: no  Dysuria or Hematuria: no  Fatigue/Achiness: YES  Sick/Strep Exposure: no  Therapies tried and outcome: vicks and hot compress    COVID antigen test negative at home yesterday    Unvaccinated for flu and COVID    Had COVID in January, this feels different    No history of asthma/copd or smoking    Review of Systems   Constitutional, HEENT, cardiovascular, pulmonary, gi and gu systems are negative, except as otherwise noted.      Objective    /72   Pulse 52   Temp 99.1  F (37.3  C) (Tympanic)   Resp 16   Ht 1.575 m (5' 2\")   Wt 55.8 kg (123 lb)   SpO2 97%   BMI 22.50 kg/m    Body mass index is 22.5 kg/m .  Physical Exam   GENERAL: healthy, alert and no distress  NECK: no adenopathy, no asymmetry, masses, or scars and thyroid normal to palpation  RESP: lungs clear to auscultation - no rales, rhonchi or wheezes  CV: regular rate and rhythm, normal S1 S2, no S3 or S4, no murmur, click or rub, no peripheral edema and peripheral pulses strong                "

## 2022-05-17 LAB — SARS-COV-2 RNA RESP QL NAA+PROBE: NEGATIVE

## 2022-05-18 ENCOUNTER — MYC MEDICAL ADVICE (OUTPATIENT)
Dept: FAMILY MEDICINE | Facility: CLINIC | Age: 62
End: 2022-05-18
Payer: COMMERCIAL

## 2022-05-18 DIAGNOSIS — H10.33 ACUTE CONJUNCTIVITIS OF BOTH EYES, UNSPECIFIED ACUTE CONJUNCTIVITIS TYPE: Primary | ICD-10-CM

## 2022-05-18 RX ORDER — OFLOXACIN 3 MG/ML
1-2 SOLUTION/ DROPS OPHTHALMIC
Qty: 5 ML | Refills: 0 | Status: SHIPPED | OUTPATIENT
Start: 2022-05-18 | End: 2022-06-10

## 2022-05-18 NOTE — TELEPHONE ENCOUNTER
Please see Senior[employer] message follow up to 5/16 Office Visit.    Cory BURNS [facility]

## 2022-06-07 DIAGNOSIS — J06.9 VIRAL URI WITH COUGH: ICD-10-CM

## 2022-06-07 RX ORDER — ALBUTEROL SULFATE 90 UG/1
2 AEROSOL, METERED RESPIRATORY (INHALATION) 4 TIMES DAILY
Qty: 18 G | Refills: 0 | Status: SHIPPED | OUTPATIENT
Start: 2022-06-07 | End: 2022-07-05

## 2022-06-10 ENCOUNTER — OFFICE VISIT (OUTPATIENT)
Dept: FAMILY MEDICINE | Facility: CLINIC | Age: 62
End: 2022-06-10
Payer: COMMERCIAL

## 2022-06-10 ENCOUNTER — MYC MEDICAL ADVICE (OUTPATIENT)
Dept: FAMILY MEDICINE | Facility: CLINIC | Age: 62
End: 2022-06-10

## 2022-06-10 VITALS
WEIGHT: 121 LBS | HEIGHT: 62 IN | HEART RATE: 76 BPM | OXYGEN SATURATION: 98 % | DIASTOLIC BLOOD PRESSURE: 68 MMHG | RESPIRATION RATE: 16 BRPM | SYSTOLIC BLOOD PRESSURE: 102 MMHG | BODY MASS INDEX: 22.26 KG/M2 | TEMPERATURE: 98.4 F

## 2022-06-10 DIAGNOSIS — L04.3 ACUTE LYMPHADENITIS OF LOWER EXTREMITY: Primary | ICD-10-CM

## 2022-06-10 DIAGNOSIS — R50.9 FEVER, UNSPECIFIED FEVER CAUSE: ICD-10-CM

## 2022-06-10 LAB
BASOPHILS # BLD AUTO: 0 10E3/UL (ref 0–0.2)
BASOPHILS NFR BLD AUTO: 0 %
EOSINOPHIL # BLD AUTO: 0.1 10E3/UL (ref 0–0.7)
EOSINOPHIL NFR BLD AUTO: 2 %
ERYTHROCYTE [DISTWIDTH] IN BLOOD BY AUTOMATED COUNT: 13.1 % (ref 10–15)
HCT VFR BLD AUTO: 42.4 % (ref 35–47)
HGB BLD-MCNC: 13.7 G/DL (ref 11.7–15.7)
IMM GRANULOCYTES # BLD: 0 10E3/UL
IMM GRANULOCYTES NFR BLD: 0 %
LYMPHOCYTES # BLD AUTO: 0.7 10E3/UL (ref 0.8–5.3)
LYMPHOCYTES NFR BLD AUTO: 21 %
MCH RBC QN AUTO: 30.7 PG (ref 26.5–33)
MCHC RBC AUTO-ENTMCNC: 32.3 G/DL (ref 31.5–36.5)
MCV RBC AUTO: 95 FL (ref 78–100)
MONOCYTES # BLD AUTO: 0.4 10E3/UL (ref 0–1.3)
MONOCYTES NFR BLD AUTO: 11 %
NEUTROPHILS # BLD AUTO: 2.4 10E3/UL (ref 1.6–8.3)
NEUTROPHILS NFR BLD AUTO: 66 %
PLATELET # BLD AUTO: 180 10E3/UL (ref 150–450)
RBC # BLD AUTO: 4.46 10E6/UL (ref 3.8–5.2)
WBC # BLD AUTO: 3.6 10E3/UL (ref 4–11)

## 2022-06-10 PROCEDURE — 87040 BLOOD CULTURE FOR BACTERIA: CPT | Performed by: FAMILY MEDICINE

## 2022-06-10 PROCEDURE — 86618 LYME DISEASE ANTIBODY: CPT | Performed by: FAMILY MEDICINE

## 2022-06-10 PROCEDURE — 85025 COMPLETE CBC W/AUTO DIFF WBC: CPT | Performed by: FAMILY MEDICINE

## 2022-06-10 PROCEDURE — 36415 COLL VENOUS BLD VENIPUNCTURE: CPT | Performed by: FAMILY MEDICINE

## 2022-06-10 PROCEDURE — 99214 OFFICE O/P EST MOD 30 MIN: CPT | Performed by: FAMILY MEDICINE

## 2022-06-10 ASSESSMENT — PAIN SCALES - GENERAL: PAINLEVEL: MILD PAIN (2)

## 2022-06-10 NOTE — PROGRESS NOTES
Assessment & Plan     Acute lymphadenitis of lower extremity   given the duration of the lymph node swelling, need further evaluation with CT and possible open biopsy    - Lyme Disease Total Abs Bld with Reflex to Confirm CLIA; Future  - CBC with platelets and differential; Future  - Blood Culture Peripheral Blood; Future  - Blood Culture Peripheral Blood; Future  - Lyme Disease Total Abs Bld with Reflex to Confirm CLIA  - CBC with platelets and differential  - Blood Culture Peripheral Blood  - CT Abdomen Pelvis w Contrast; Future  - Blood Culture Peripheral Blood    Fever, unspecified fever cause  Wbc is normal/low  Likely viral  - Lyme Disease Total Abs Bld with Reflex to Confirm CLIA; Future  - CBC with platelets and differential; Future  - Blood Culture Peripheral Blood; Future  - Blood Culture Peripheral Blood; Future  - Lyme Disease Total Abs Bld with Reflex to Confirm CLIA  - CBC with platelets and differential  - Blood Culture Peripheral Blood  - Blood Culture Peripheral Blood                 No follow-ups on file.    Yareli Casiano MD  [facility]    Gary Higuera is a 61 year old who presents for the following health issues     HPI     Chief Complaint   Patient presents with     Mass     Patient has swollen glands in groin area with redness and fevers of 101.3 X 2 days.      Has had right groin lymph node swelling since early May with a viral infection.  Was feeling better but the lymph node remains swollen/tender.     No night sweats.  Fever x 2 days - 101.3 or so.     No chest pain/pressure/soa.   No vaginal symptoms (h/o HSV but no outbreaks).  No urinary symptoms  No abdominal pain, no cough, no rashes or cellulitis.     Wt Readings from Last 5 Encounters:   06/10/22 54.9 kg (121 lb)   05/16/22 55.8 kg (123 lb)   12/15/21 54.4 kg (120 lb)   10/21/21 55.8 kg (123 lb)   10/12/21 54.7 kg (120 lb 9.6 oz)             Review of Systems   Constitutional, HEENT,  "cardiovascular, pulmonary, gi and gu systems are negative, except as otherwise noted.      Objective    /68   Pulse 76   Temp 98.4  F (36.9  C) (Tympanic)   Resp 16   Ht 1.575 m (5' 2\")   Wt 54.9 kg (121 lb)   SpO2 98%   Breastfeeding No   BMI 22.13 kg/m    Body mass index is 22.13 kg/m .  Physical Exam   GENERAL: healthy, alert and no distress  NECK: no adenopathy, no asymmetry, masses, or scars and thyroid normal to palpation  RESP: lungs clear to auscultation - no rales, rhonchi or wheezes  CV: regular rate and rhythm, normal S1 S2, no S3 or S4, no murmur, click or rub, no peripheral edema and peripheral pulses strong  ABDOMEN: soft, nontender, no hepatosplenomegaly, no masses and bowel sounds normal  MS: no gross musculoskeletal defects noted, no edema  LYMPH: inguinal: enlarged lymph nodes in the right inguinal area.    No axillary lymph nodes, liver/spleen are normal.      Results for orders placed or performed in visit on 06/10/22   CBC with platelets and differential     Status: Abnormal   Result Value Ref Range    WBC Count 3.6 (L) 4.0 - 11.0 10e3/uL    RBC Count 4.46 3.80 - 5.20 10e6/uL    Hemoglobin 13.7 11.7 - 15.7 g/dL    Hematocrit 42.4 35.0 - 47.0 %    MCV 95 78 - 100 fL    MCH 30.7 26.5 - 33.0 pg    MCHC 32.3 31.5 - 36.5 g/dL    RDW 13.1 10.0 - 15.0 %    Platelet Count 180 150 - 450 10e3/uL    % Neutrophils 66 %    % Lymphocytes 21 %    % Monocytes 11 %    % Eosinophils 2 %    % Basophils 0 %    % Immature Granulocytes 0 %    Absolute Neutrophils 2.4 1.6 - 8.3 10e3/uL    Absolute Lymphocytes 0.7 (L) 0.8 - 5.3 10e3/uL    Absolute Monocytes 0.4 0.0 - 1.3 10e3/uL    Absolute Eosinophils 0.1 0.0 - 0.7 10e3/uL    Absolute Basophils 0.0 0.0 - 0.2 10e3/uL    Absolute Immature Granulocytes 0.0 <=0.4 10e3/uL   CBC with platelets and differential     Status: Abnormal    Narrative    The following orders were created for panel order CBC with platelets and differential.  Procedure                   "             Abnormality         Status                     ---------                               -----------         ------                     CBC with platelets and d...[[id number]]  Abnormal            Final result                 Please view results for these tests on the individual orders.

## 2022-06-10 NOTE — PATIENT INSTRUCTIONS
Recheck if fever > 5 days    CT scan of the abdomen/pelvis to get a better look at the lymph node enlargement. You may need a biopsy of this depending on the results of the imaging.    Yareli Casiano M.D.      Our Clinic hours are:  Mondays    7:20 am - 7 pm  Tues -  Fri  7:20 am - 5 pm    Clinic Phone: [phone]    The clinic lab opens at 7:30 am Mon - Fri and appointments are required.    [facility]. [phone]  Monday  8 am - 7pm  Tues - Fri 8 am - 5:30 pm

## 2022-06-13 ENCOUNTER — HOSPITAL ENCOUNTER (OUTPATIENT)
Dept: CT IMAGING | Facility: CLINIC | Age: 62
Discharge: HOME | End: 2022-06-13
Attending: FAMILY MEDICINE | Admitting: FAMILY MEDICINE
Payer: COMMERCIAL

## 2022-06-13 DIAGNOSIS — L04.3 ACUTE LYMPHADENITIS OF LOWER EXTREMITY: ICD-10-CM

## 2022-06-13 LAB — B BURGDOR IGG+IGM SER QL: 0.19

## 2022-06-13 PROCEDURE — 250N000011 HC RX IP 250 OP 636: Performed by: RADIOLOGY

## 2022-06-13 PROCEDURE — 74177 CT ABD & PELVIS W/CONTRAST: CPT

## 2022-06-13 PROCEDURE — 250N000009 HC RX 250: Performed by: RADIOLOGY

## 2022-06-13 RX ORDER — IOPAMIDOL 755 MG/ML
52 INJECTION, SOLUTION INTRAVASCULAR ONCE
Status: COMPLETED | OUTPATIENT
Start: 2022-06-13 | End: 2022-06-13

## 2022-06-13 RX ADMIN — IOPAMIDOL 52 ML: 755 INJECTION, SOLUTION INTRAVENOUS at 17:26

## 2022-06-13 RX ADMIN — SODIUM CHLORIDE 55 ML: 9 INJECTION, SOLUTION INTRAVENOUS at 17:27

## 2022-06-14 ENCOUNTER — TELEPHONE (OUTPATIENT)
Dept: FAMILY MEDICINE | Facility: CLINIC | Age: 62
End: 2022-06-14
Payer: COMMERCIAL

## 2022-06-14 ENCOUNTER — MYC MEDICAL ADVICE (OUTPATIENT)
Dept: FAMILY MEDICINE | Facility: CLINIC | Age: 62
End: 2022-06-14
Payer: COMMERCIAL

## 2022-06-14 NOTE — TELEPHONE ENCOUNTER
Reason for Call:  Request for results:    Name of test or procedure: CT Scan - Pt calling for results and pt also calling to report that she has a headache, night sweats and the spot on her leg is more red and bigger.  Please call patient and advise - Pt would like to speak to RN TODAY.      Date of test of procedure: 6/10     Location of the test or procedure: [facility]    OK to leave the result message on voice mail or with a family member? YES    Phone number Patient can be reached at:  Home number on file [phone] (home)    Additional comments:     Call taken on 6/14/2022 at 2:16 PM by Kim Spencer

## 2022-06-14 NOTE — TELEPHONE ENCOUNTER
RN called and patient wanted nothing to do with RN. Did not want to talk more about her symptoms, she is only wanting the results of her CT which Provider has not reviewed yet as they are still in process from the radiologist. She wants her symptoms sent to Provider as FYI only. Please review the note below as well.    Kristen Fox, MAUN, RN, PHN

## 2022-06-15 ENCOUNTER — MYC MEDICAL ADVICE (OUTPATIENT)
Dept: FAMILY MEDICINE | Facility: CLINIC | Age: 62
End: 2022-06-15
Payer: COMMERCIAL

## 2022-06-15 LAB
BACTERIA BLD CULT: NO GROWTH
BACTERIA BLD CULT: NO GROWTH

## 2022-06-15 NOTE — TELEPHONE ENCOUNTER
Pt is calling and awaiting her CT test result that was done on 6/13/22.    Jolie Mcconnell  [facility]

## 2022-06-15 NOTE — TELEPHONE ENCOUNTER
"6/15/2022 2:52 PM results are just now back.    Called patient to discuss.    Last night was the first time she had \"less night sweats\"     Fever better in the past two days    Still has quite a bit of a headache.     The groin area is less sensitive and maybe slightly decreased in size.         "

## 2022-06-22 ENCOUNTER — OFFICE VISIT (OUTPATIENT)
Dept: FAMILY MEDICINE | Facility: CLINIC | Age: 62
End: 2022-06-22
Payer: COMMERCIAL

## 2022-06-22 VITALS
TEMPERATURE: 98.9 F | RESPIRATION RATE: 16 BRPM | HEIGHT: 62 IN | OXYGEN SATURATION: 98 % | SYSTOLIC BLOOD PRESSURE: 110 MMHG | WEIGHT: 121.2 LBS | DIASTOLIC BLOOD PRESSURE: 63 MMHG | HEART RATE: 85 BPM | BODY MASS INDEX: 22.31 KG/M2

## 2022-06-22 DIAGNOSIS — A69.20 LYME DISEASE: Primary | ICD-10-CM

## 2022-06-22 DIAGNOSIS — L04.3 ACUTE LYMPHADENITIS OF LOWER EXTREMITY: ICD-10-CM

## 2022-06-22 LAB
ALBUMIN SERPL-MCNC: 2.9 G/DL (ref 3.4–5)
ALP SERPL-CCNC: 102 U/L (ref 40–150)
ALT SERPL W P-5'-P-CCNC: 21 U/L (ref 0–50)
ANION GAP SERPL CALCULATED.3IONS-SCNC: 5 MMOL/L (ref 3–14)
AST SERPL W P-5'-P-CCNC: 14 U/L (ref 0–45)
BASOPHILS # BLD AUTO: 0 10E3/UL (ref 0–0.2)
BASOPHILS NFR BLD AUTO: 0 %
BILIRUB SERPL-MCNC: 0.3 MG/DL (ref 0.2–1.3)
BUN SERPL-MCNC: 30 MG/DL (ref 7–30)
CALCIUM SERPL-MCNC: 9 MG/DL (ref 8.5–10.1)
CHLORIDE BLD-SCNC: 104 MMOL/L (ref 94–109)
CO2 SERPL-SCNC: 31 MMOL/L (ref 20–32)
CREAT SERPL-MCNC: 1.08 MG/DL (ref 0.52–1.04)
CRP SERPL-MCNC: 46.2 MG/L (ref 0–8)
EOSINOPHIL # BLD AUTO: 0.4 10E3/UL (ref 0–0.7)
EOSINOPHIL NFR BLD AUTO: 6 %
ERYTHROCYTE [DISTWIDTH] IN BLOOD BY AUTOMATED COUNT: 13.2 % (ref 10–15)
ERYTHROCYTE [SEDIMENTATION RATE] IN BLOOD BY WESTERGREN METHOD: 50 MM/HR (ref 0–30)
GFR SERPL CREATININE-BSD FRML MDRD: 58 ML/MIN/1.73M2
GLUCOSE BLD-MCNC: 114 MG/DL (ref 70–99)
HCT VFR BLD AUTO: 36.8 % (ref 35–47)
HGB BLD-MCNC: 11.6 G/DL (ref 11.7–15.7)
LYMPHOCYTES # BLD AUTO: 0.9 10E3/UL (ref 0.8–5.3)
LYMPHOCYTES NFR BLD AUTO: 12 %
MCH RBC QN AUTO: 30.3 PG (ref 26.5–33)
MCHC RBC AUTO-ENTMCNC: 31.5 G/DL (ref 31.5–36.5)
MCV RBC AUTO: 96 FL (ref 78–100)
MONOCYTES # BLD AUTO: 0.4 10E3/UL (ref 0–1.3)
MONOCYTES NFR BLD AUTO: 5 %
NEUTROPHILS # BLD AUTO: 6.1 10E3/UL (ref 1.6–8.3)
NEUTROPHILS NFR BLD AUTO: 78 %
PLATELET # BLD AUTO: 381 10E3/UL (ref 150–450)
POTASSIUM BLD-SCNC: 3.8 MMOL/L (ref 3.4–5.3)
PROT SERPL-MCNC: 6.7 G/DL (ref 6.8–8.8)
RBC # BLD AUTO: 3.83 10E6/UL (ref 3.8–5.2)
SODIUM SERPL-SCNC: 140 MMOL/L (ref 133–144)
WBC # BLD AUTO: 7.8 10E3/UL (ref 4–11)

## 2022-06-22 PROCEDURE — 99000 SPECIMEN HANDLING OFFICE-LAB: CPT | Performed by: PHYSICIAN ASSISTANT

## 2022-06-22 PROCEDURE — 86666 EHRLICHIA ANTIBODY: CPT | Mod: 90 | Performed by: PHYSICIAN ASSISTANT

## 2022-06-22 PROCEDURE — 36415 COLL VENOUS BLD VENIPUNCTURE: CPT | Performed by: PHYSICIAN ASSISTANT

## 2022-06-22 PROCEDURE — 80053 COMPREHEN METABOLIC PANEL: CPT | Performed by: PHYSICIAN ASSISTANT

## 2022-06-22 PROCEDURE — 86788 WEST NILE VIRUS AB IGM: CPT | Mod: 90 | Performed by: PHYSICIAN ASSISTANT

## 2022-06-22 PROCEDURE — 86789 WEST NILE VIRUS ANTIBODY: CPT | Mod: 90 | Performed by: PHYSICIAN ASSISTANT

## 2022-06-22 PROCEDURE — 99214 OFFICE O/P EST MOD 30 MIN: CPT | Performed by: PHYSICIAN ASSISTANT

## 2022-06-22 PROCEDURE — 85025 COMPLETE CBC W/AUTO DIFF WBC: CPT | Performed by: PHYSICIAN ASSISTANT

## 2022-06-22 PROCEDURE — 85652 RBC SED RATE AUTOMATED: CPT | Performed by: PHYSICIAN ASSISTANT

## 2022-06-22 PROCEDURE — 86618 LYME DISEASE ANTIBODY: CPT | Performed by: PHYSICIAN ASSISTANT

## 2022-06-22 PROCEDURE — 86617 LYME DISEASE ANTIBODY: CPT | Performed by: PHYSICIAN ASSISTANT

## 2022-06-22 PROCEDURE — 86140 C-REACTIVE PROTEIN: CPT | Performed by: PHYSICIAN ASSISTANT

## 2022-06-22 PROCEDURE — 86757 RICKETTSIA ANTIBODY: CPT | Mod: 90 | Performed by: PHYSICIAN ASSISTANT

## 2022-06-22 RX ORDER — DOXYCYCLINE 100 MG/1
100 CAPSULE ORAL 2 TIMES DAILY
Qty: 28 CAPSULE | Refills: 0 | Status: SHIPPED | OUTPATIENT
Start: 2022-06-22 | End: 2022-07-06

## 2022-06-22 NOTE — PATIENT INSTRUCTIONS
Repeat CT scan in minimum 3 weeks  Please contact [facility]  at [phone] to schedule appointment      Doxycycline antibiotics     Please follow up if your symptoms worsen, change, or are not improving

## 2022-06-22 NOTE — PROGRESS NOTES
Assessment & Plan     ASSESSMENT/PLAN:      ICD-10-CM    1. Lyme disease  A69.20 doxycycline monohydrate (MONODOX) 100 MG capsule   2. Acute lymphadenitis of lower extremity  L04.3 CBC with platelets and differential     Anaplasma phagocytoph antibody IgG IgM     Lyme Disease Total Abs Bld with Reflex to Confirm CLIA     West Nile Virus Antibody IgG IgM     Comprehensive metabolic panel (BMP + Alb, Alk Phos, ALT, AST, Total. Bili, TP)     Rickettsia rickettsii antibody IgG & IgM     CBC with platelets and differential     Anaplasma phagocytoph antibody IgG IgM     Lyme Disease Total Abs Bld with Reflex to Confirm CLIA     West Nile Virus Antibody IgG IgM     Comprehensive metabolic panel (BMP + Alb, Alk Phos, ALT, AST, Total. Bili, TP)     Rickettsia rickettsii antibody IgG & IgM     ESR: Erythrocyte sedimentation rate     CRP, inflammation     CRP, inflammation     ESR: Erythrocyte sedimentation rate     CT Abdomen Pelvis w Contrast     LYME CONFIRM IGG/IGM BY CHEMILUMINESCENT IA BLOOD     Signs to be seen again were discussed.  Will check labs. Will start treatment for Lyme disease based on symptoms / rash.    Patient Instructions   Repeat CT scan in minimum 3 weeks  Please contact [facility]  at [phone] to schedule appointment      Doxycycline antibiotics     Please follow up if your symptoms worsen, change, or are not improving    Return in about 2 weeks (around 7/6/2022) for  if not improving.    ESSENCE Mohan [facility] IOANA Higuera is a 61 year old accompanied by her spouse., presenting for the following health issues:  RECHECK (Follow up )      History of Present Illness       Reason for visit:  Swollen lymph nodes. Re and purple blotch s on body. Bone and joint pain.    She eats 2-3 servings of fruits and vegetables daily.She consumes 0 sweetened beverage(s) daily.She exercises with enough effort to increase her heart rate 20 to 29  "minutes per day.  She exercises with enough effort to increase her heart rate 4 days per week.   She is taking medications regularly.     *  Bone pain has been moving through her body, low grade fevers over the last 2 weeks but none recently. States she has been getting more blotches and has noticed them getting larger.    - fevers: temp  (one time 101) 2 weeks ago. Last week 98-99. 99.9 yesterday was highest recently.  Taking ibuprofen every 4-6 hours for pain/fever   had fever a couple days right at the beginning then recovered. They had both been camping for 5 days prior.    - pain: right groin with the lymphadenopathy. Then moved to hips, now has pain bilateral jaw/anterior cervix, back of neck. Now also having left sided facial pain.  No congestion/rhinitis/cough. She had cough from a cold beginning of May that is still sporadic.  No abdominal pain, no N/V, normal BMs,. Normal eating/drinking    - skin blotches: skin was red around red groin lymph nodes - has gotten bigger, more purply  A few days ago more: buttocks, abdomen, under breast, tailbone, thigh, upper chest. Itchy, getting bigger.    [place] home, [place], has had some ticks.   Not around cats. No mosquito bites. [place] water [place] campground.  Around some fungus on recently cut trees near her garden. One day after working there had sudden sore throat for a day.          Review of Systems   Other than noted above, general, HEENT, respiratory, cardiac, MS, and gastrointestinal systems are negative.       Objective    /63   Pulse 85   Temp 98.9  F (37.2  C) (Tympanic)   Resp 16   Ht 1.575 m (5' 2\")   Wt 55 kg (121 lb 3.2 oz)   SpO2 98%   BMI 22.17 kg/m    Body mass index is 22.17 kg/m .  Physical Exam   GENERAL: healthy, alert and no distress  EYES: Eyes grossly normal to inspection, PERRL and conjunctivae and sclerae normal  HENT: ear canals and TM's normal, nose and mouth without ulcers or lesions  POSITIVE left TMJ tender to " palpation  NECK: no adenopathy, no asymmetry, masses, or scars and thyroid normal to palpation  RESP: lungs clear to auscultation - no rales, rhonchi or wheezes  CV: regular rate and rhythm, normal S1 S2, no S3 or S4, no murmur, click or rub, no peripheral edema and peripheral pulses strong  ABDOMEN: soft, nontender, no hepatosplenomegaly, no masses and bowel sounds normal  MS: no gross musculoskeletal defects noted, no edema  Neck: full ROM, painful mildly with ROM to patient. No real tenderness to palpation  SKIN: POSITIVE faint erythematous well demarcated oval shaped macular areas - large - scattered upper legs/trunk. On buttocks/low back shows similar rash however with these could see darker erythematous outline in the center - appearance of bullseye  NEURO: Normal strength and tone, mentation intact and speech normal  BACK: no CVA tenderness, no paralumbar tenderness  PSYCH: mentation appears normal, affect normal/bright  LYMPH: anterior cervical: no adenopathy  posterior cervical: no adenopathy  supraclavicular: no adenopathy  inguinal: enlarged tender nodes bilaterally - less tender than previous per patient     Results for orders placed or performed in visit on 06/22/22   Lyme Disease Total Abs Bld with Reflex to Confirm CLIA     Status: Abnormal   Result Value Ref Range    Lyme Disease Antibodies Total >10.00 (H) <0.90   West Nile Virus Antibody IgG IgM     Status: None   Result Value Ref Range    West Nile IgG Serum 0.98 <=1.29 IV    West Nile IgM Serum 0.04 <=0.89 IV   Comprehensive metabolic panel (BMP + Alb, Alk Phos, ALT, AST, Total. Bili, TP)     Status: Abnormal   Result Value Ref Range    Sodium 140 133 - 144 mmol/L    Potassium 3.8 3.4 - 5.3 mmol/L    Chloride 104 94 - 109 mmol/L    Carbon Dioxide (CO2) 31 20 - 32 mmol/L    Anion Gap 5 3 - 14 mmol/L    Urea Nitrogen 30 7 - 30 mg/dL    Creatinine 1.08 (H) 0.52 - 1.04 mg/dL    Calcium 9.0 8.5 - 10.1 mg/dL    Glucose 114 (H) 70 - 99 mg/dL    Alkaline  Phosphatase 102 40 - 150 U/L    AST 14 0 - 45 U/L    ALT 21 0 - 50 U/L    Protein Total 6.7 (L) 6.8 - 8.8 g/dL    Albumin 2.9 (L) 3.4 - 5.0 g/dL    Bilirubin Total 0.3 0.2 - 1.3 mg/dL    GFR Estimate 58 (L) >60 mL/min/1.73m2   Rickettsia rickettsii antibody IgG & IgM     Status: None   Result Value Ref Range    Delfino Mt Spotted Fever IgG <1:64 <1:64    Delfino Mt Spotted Fever IgM <1:64 <1:64   CBC with platelets and differential     Status: Abnormal   Result Value Ref Range    WBC Count 7.8 4.0 - 11.0 10e3/uL    RBC Count 3.83 3.80 - 5.20 10e6/uL    Hemoglobin 11.6 (L) 11.7 - 15.7 g/dL    Hematocrit 36.8 35.0 - 47.0 %    MCV 96 78 - 100 fL    MCH 30.3 26.5 - 33.0 pg    MCHC 31.5 31.5 - 36.5 g/dL    RDW 13.2 10.0 - 15.0 %    Platelet Count 381 150 - 450 10e3/uL    % Neutrophils 78 %    % Lymphocytes 12 %    % Monocytes 5 %    % Eosinophils 6 %    % Basophils 0 %    Absolute Neutrophils 6.1 1.6 - 8.3 10e3/uL    Absolute Lymphocytes 0.9 0.8 - 5.3 10e3/uL    Absolute Monocytes 0.4 0.0 - 1.3 10e3/uL    Absolute Eosinophils 0.4 0.0 - 0.7 10e3/uL    Absolute Basophils 0.0 0.0 - 0.2 10e3/uL   CRP, inflammation     Status: Abnormal   Result Value Ref Range    CRP Inflammation 46.2 (H) 0.0 - 8.0 mg/L   ESR: Erythrocyte sedimentation rate     Status: Abnormal   Result Value Ref Range    Erythrocyte Sedimentation Rate 50 (H) 0 - 30 mm/hr   LYME CONFIRM IGG/IGM BY CHEMILUMINESCENT IA BLOOD     Status: Abnormal   Result Value Ref Range    Lyme Confirm IgG by CLIA Instrument Value 32.40 (H) <0.90 Index    Lyme Confirm IgG by CLIA Blood Reactive (A) Nonreactive    Lyme Confirm IgM by CLIA Instrument Value 7.76 (H) <0.90 Index    Lyme Confirm IgM by CLIA Blood Reactive (A) Nonreactive    Narrative    Assay Results should be utilized in conjunction with other clinical and laboratory data to assist the clinician in making individual patient management decisions.    CBC with platelets and differential     Status: Abnormal    Narrative     The following orders were created for panel order CBC with platelets and differential.  Procedure                               Abnormality         Status                     ---------                               -----------         ------                     CBC with platelets and d...[[id number]]  Abnormal            Final result                 Please view results for these tests on the individual orders.                 .  ..

## 2022-06-23 ENCOUNTER — MYC MEDICAL ADVICE (OUTPATIENT)
Dept: FAMILY MEDICINE | Facility: CLINIC | Age: 62
End: 2022-06-23

## 2022-06-23 LAB
B BURGDOR IGG+IGM SER QL: >10
WNV IGG SER IA-ACNC: 0.98 IV
WNV IGM SER IA-ACNC: 0.04 IV

## 2022-06-24 LAB
B BURGDOR IGG SERPL QL IA: 32.4 INDEX
B BURGDOR IGG SERPL QL IA: REACTIVE
B BURGDOR IGM SERPL QL IA: 7.76 INDEX
B BURGDOR IGM SERPL QL IA: REACTIVE

## 2022-06-25 LAB
R RICKETTSI IGG TITR SER IF: NORMAL {TITER}
R RICKETTSI IGM TITR SER IF: NORMAL {TITER}

## 2022-06-27 LAB
A PHAGOCYTOPH IGG TITR SER IF: ABNORMAL {TITER}
A PHAGOCYTOPH IGM TITR SER IF: ABNORMAL {TITER}

## 2022-07-03 DIAGNOSIS — J06.9 VIRAL URI WITH COUGH: ICD-10-CM

## 2022-07-05 RX ORDER — ALBUTEROL SULFATE 90 UG/1
2 AEROSOL, METERED RESPIRATORY (INHALATION) 4 TIMES DAILY
Qty: 18 G | Refills: 0 | Status: SHIPPED | OUTPATIENT
Start: 2022-07-05 | End: 2023-01-25

## 2022-07-13 ENCOUNTER — LAB (OUTPATIENT)
Dept: LAB | Facility: CLINIC | Age: 62
End: 2022-07-13
Payer: COMMERCIAL

## 2022-07-13 DIAGNOSIS — L04.3 ACUTE LYMPHADENITIS OF LOWER EXTREMITY: ICD-10-CM

## 2022-07-13 DIAGNOSIS — E04.2 NONTOXIC MULTINODULAR GOITER: ICD-10-CM

## 2022-07-13 LAB
ALBUMIN SERPL-MCNC: 3.6 G/DL (ref 3.4–5)
ALP SERPL-CCNC: 83 U/L (ref 40–150)
ALT SERPL W P-5'-P-CCNC: 26 U/L (ref 0–50)
ANION GAP SERPL CALCULATED.3IONS-SCNC: 5 MMOL/L (ref 3–14)
AST SERPL W P-5'-P-CCNC: 19 U/L (ref 0–45)
BASOPHILS # BLD AUTO: 0 10E3/UL (ref 0–0.2)
BASOPHILS NFR BLD AUTO: 0 %
BILIRUB SERPL-MCNC: 0.5 MG/DL (ref 0.2–1.3)
BUN SERPL-MCNC: 24 MG/DL (ref 7–30)
CALCIUM SERPL-MCNC: 9.1 MG/DL (ref 8.5–10.1)
CHLORIDE BLD-SCNC: 106 MMOL/L (ref 94–109)
CO2 SERPL-SCNC: 31 MMOL/L (ref 20–32)
CREAT SERPL-MCNC: 0.95 MG/DL (ref 0.52–1.04)
EOSINOPHIL # BLD AUTO: 0.4 10E3/UL (ref 0–0.7)
EOSINOPHIL NFR BLD AUTO: 9 %
ERYTHROCYTE [DISTWIDTH] IN BLOOD BY AUTOMATED COUNT: 15.2 % (ref 10–15)
GFR SERPL CREATININE-BSD FRML MDRD: 67 ML/MIN/1.73M2
GLUCOSE BLD-MCNC: 97 MG/DL (ref 70–99)
HCT VFR BLD AUTO: 39.4 % (ref 35–47)
HGB BLD-MCNC: 12.4 G/DL (ref 11.7–15.7)
LYMPHOCYTES # BLD AUTO: 1.5 10E3/UL (ref 0.8–5.3)
LYMPHOCYTES NFR BLD AUTO: 36 %
MCH RBC QN AUTO: 30.7 PG (ref 26.5–33)
MCHC RBC AUTO-ENTMCNC: 31.5 G/DL (ref 31.5–36.5)
MCV RBC AUTO: 98 FL (ref 78–100)
MONOCYTES # BLD AUTO: 0.4 10E3/UL (ref 0–1.3)
MONOCYTES NFR BLD AUTO: 9 %
NEUTROPHILS # BLD AUTO: 2 10E3/UL (ref 1.6–8.3)
NEUTROPHILS NFR BLD AUTO: 46 %
PLATELET # BLD AUTO: 213 10E3/UL (ref 150–450)
POTASSIUM BLD-SCNC: 3.9 MMOL/L (ref 3.4–5.3)
PROT SERPL-MCNC: 6.8 G/DL (ref 6.8–8.8)
RBC # BLD AUTO: 4.04 10E6/UL (ref 3.8–5.2)
SODIUM SERPL-SCNC: 142 MMOL/L (ref 133–144)
WBC # BLD AUTO: 4.2 10E3/UL (ref 4–11)

## 2022-07-13 PROCEDURE — 80053 COMPREHEN METABOLIC PANEL: CPT

## 2022-07-13 PROCEDURE — 85025 COMPLETE CBC W/AUTO DIFF WBC: CPT

## 2022-07-13 PROCEDURE — 36415 COLL VENOUS BLD VENIPUNCTURE: CPT

## 2022-09-20 ENCOUNTER — HOSPITAL ENCOUNTER (OUTPATIENT)
Dept: CT IMAGING | Facility: CLINIC | Age: 62
Discharge: HOME | End: 2022-09-20
Attending: PHYSICIAN ASSISTANT | Admitting: PHYSICIAN ASSISTANT
Payer: COMMERCIAL

## 2022-09-20 DIAGNOSIS — L04.3 ACUTE LYMPHADENITIS OF LOWER EXTREMITY: ICD-10-CM

## 2022-09-20 PROCEDURE — 250N000009 HC RX 250: Performed by: PHYSICIAN ASSISTANT

## 2022-09-20 PROCEDURE — 250N000011 HC RX IP 250 OP 636: Performed by: PHYSICIAN ASSISTANT

## 2022-09-20 PROCEDURE — 74177 CT ABD & PELVIS W/CONTRAST: CPT

## 2022-09-20 RX ORDER — IOPAMIDOL 755 MG/ML
59 INJECTION, SOLUTION INTRAVASCULAR ONCE
Status: COMPLETED | OUTPATIENT
Start: 2022-09-20 | End: 2022-09-20

## 2022-09-20 RX ADMIN — SODIUM CHLORIDE 55 ML: 9 INJECTION, SOLUTION INTRAVENOUS at 14:06

## 2022-09-20 RX ADMIN — IOPAMIDOL 59 ML: 755 INJECTION, SOLUTION INTRAVENOUS at 14:05

## 2022-10-13 NOTE — TELEPHONE ENCOUNTER
Routing refill request to provider for review/approval because:  Would like Provider to decide as I am not sure how long patient is to be on this. Thank you.  Pardeep Flores RN           [Procedure: _________] : a [unfilled] procedure visit [FreeTextEntry1] : The pt is here to have the excision of a lipoma from his back. The pt states that he is good health, and has no concerns about the procedure.

## 2022-12-16 DIAGNOSIS — I10 ESSENTIAL HYPERTENSION: ICD-10-CM

## 2022-12-16 RX ORDER — DILTIAZEM HYDROCHLORIDE 180 MG/1
CAPSULE, EXTENDED RELEASE ORAL
Qty: 90 CAPSULE | Refills: 1 | Status: SHIPPED | OUTPATIENT
Start: 2022-12-16 | End: 2023-06-08

## 2023-01-13 ENCOUNTER — TELEPHONE (OUTPATIENT)
Dept: DERMATOLOGY | Facility: CLINIC | Age: 63
End: 2023-01-13

## 2023-01-13 DIAGNOSIS — L90.0 LICHEN SCLEROSUS: Primary | ICD-10-CM

## 2023-01-13 RX ORDER — CLOBETASOL PROPIONATE 0.5 MG/G
OINTMENT TOPICAL
Qty: 30 G | Refills: 3 | Status: SHIPPED | OUTPATIENT
Start: 2023-01-13 | End: 2024-08-26

## 2023-01-13 NOTE — TELEPHONE ENCOUNTER
Left message that script was sent and to make follow up appt this year.  Damaris BALBUENA RN BSN PHN  Specialty Clinics

## 2023-01-13 NOTE — TELEPHONE ENCOUNTER
[facility] Call Center    Phone Message    May a detailed message be left on voicemail: no     Reason for Call: Other: Pt states she would like to discuss a medication that had been prescribed a few years ago for an issue in her vaginal area but she can't remember the name of it.      Please call Pt back to discuss. Thank you.     Action Taken: Other: [place] [facility]    Travel Screening: Not Applicable

## 2023-01-13 NOTE — TELEPHONE ENCOUNTER
Spoke with patient and she stated that she was offered a steroid cream previously for Lichen Sclerosus.  Diagnosed on 10/13/2020. Pt reported she has been having some symptoms again and would like to try the steroid cream now. Did advise her that an appt may be needed after she tries the cream for follow up. Thanks  Damaris BALBUENA RN BSN PHN  Specialty Clinics

## 2023-01-14 ENCOUNTER — HEALTH MAINTENANCE LETTER (OUTPATIENT)
Age: 63
End: 2023-01-14

## 2023-01-25 ENCOUNTER — OFFICE VISIT (OUTPATIENT)
Dept: CARDIOLOGY | Facility: CLINIC | Age: 63
End: 2023-01-25
Payer: COMMERCIAL

## 2023-01-25 VITALS
BODY MASS INDEX: 23.05 KG/M2 | WEIGHT: 126 LBS | SYSTOLIC BLOOD PRESSURE: 118 MMHG | RESPIRATION RATE: 14 BRPM | DIASTOLIC BLOOD PRESSURE: 72 MMHG | HEART RATE: 64 BPM

## 2023-01-25 DIAGNOSIS — I10 ESSENTIAL HYPERTENSION: Primary | ICD-10-CM

## 2023-01-25 DIAGNOSIS — I25.42 DISSECTION OF CORONARY ARTERY: ICD-10-CM

## 2023-01-25 DIAGNOSIS — E04.1 THYROID NODULE: Primary | ICD-10-CM

## 2023-01-25 DIAGNOSIS — I10 ESSENTIAL HYPERTENSION: ICD-10-CM

## 2023-01-25 PROCEDURE — 99213 OFFICE O/P EST LOW 20 MIN: CPT | Performed by: INTERNAL MEDICINE

## 2023-01-25 NOTE — LETTER
1/25/2023    Paulina Zamora PA-C  [id number] [facility] [address]    RE: Davida Mo       Dear Colleague,     I had the pleasure of seeing Davida Mo in the [facility].      [facility]  [phone]      Assessment/Recommendations   Patient with history of probable coronary artery dissection with small wall motion normality and abnormal troponin.  She has been stable for quite some time now without any recurrence of symptoms.  Her blood pressure is well controlled.  She has not been exercising as much as she would like but tells me they are going to be purchasing a treadmill and will start walking on the treadmill.  I recommended brisk walking for 30 minutes and at least 5 times each week.    If she remains stable I like to see her back in 2 years, but of course to be happy to see her sooner if questions or problems arise.       History of Present Illness/Subjective    Ms. Davida Mo is a 62 year old female with known history of probable coronary artery dissection.  She has been stable since her last visit.  She has not had any chest discomfort.  She denies any shortness of breath which is unusual.  She denies syncopal or near syncopal episodes, orthopnea, paroxysmal nocturnal dyspnea, peripheral edema.  Blood pressures have been running good.     Physical Examination Review of Systems   /72 (BP Location: Right arm, Patient Position: Sitting, Cuff Size: Adult Regular)   Pulse 64   Resp 14   Wt 57.2 kg (126 lb)   BMI 23.05 kg/m    Body mass index is 23.05 kg/m .  Wt Readings from Last 3 Encounters:   01/25/23 57.2 kg (126 lb)   06/22/22 55 kg (121 lb 3.2 oz)   06/10/22 54.9 kg (121 lb)     General Appearance:   Alert, cooperative and in no acute distress.   ENT/Mouth: Patient wearing a mask.      EYES:  no scleral icterus, normal conjunctivae   Neck: JVP normal. No Hepatojugular reflux. Thyroid not visualized.   Chest/Lungs:   Lungs are clear to  auscultation, equal chest wall expansion.   Cardiovascular:   S1, S2 without murmur ,clicks or rubs. Brachial, radial  pulses are intact and symetric. No carotid bruits noted   Abdomen:  Nontender.    Extremities: No cyanosis, clubbing or edema   Skin: no xanthelasma, warm.    Neurologic: normal arm movement bilateral, no tremors     Psychiatric: Appropriate affect.      Enc Vitals  BP: 118/72  Pulse: 64  Resp: 14  Weight: 57.2 kg (126 lb)                                           Medical History  Surgical History Family History Social History   Past Medical History:   Diagnosis Date     Arthritis      Depression     on meds from 96 to 06. no symptoms have returned     Hypertension      IBS (irritable bowel syndrome)      Motion sickness      Need for prophylactic hormone replacement therapy (postmenopausal)      NSTEMI (non-ST elevated myocardial infarction) (H) 2011,9/23/2014    see care everywhere     Prinzmetal angina (H)     first heart attack. prinzmetal angina diagnosis     Thyroid disease     Past Surgical History:   Procedure Laterality Date     BUNIONECTOMY RT/LT       DILATION AND CURETTAGE       DILATION AND CURETTAGE, OPERATIVE HYSTEROSCOPY, COMBINED N/A 12/20/2021    Procedure: HYSTEROSCOPY, POLYPECTOMY;  Surgeon: Sebastian Mcguire MD;  Location: [place]     TUBAL LIGATION       TUBAL LIGATION      Family History   Problem Relation Age of Onset     Thyroid Disease Sister      Lung Cancer Sister 67        Lung Cancer, removal of left lower lung     Diabetes Mother      Hypertension Mother      Allergies Mother      Arthritis Mother      Eye Disorder Mother      Gastrointestinal Disease Mother         diverticulitis     Osteoporosis Mother         and spinal stenosis     Asthma Mother      Atrial fibrillation Mother      Diverticulitis Mother      Diabetes Type 2  Mother 70.00     Heart Disease Father      Alcohol/Drug Father      Irritable Bowel Syndrome Father         possibly     Heart  Failure Father 71.00     Cancer Sister      Neurologic Disorder Sister         brain tumor / cancer     Bone Cancer Sister      Cancer Sister      Parkinsonism Brother      Breast Cancer No family hx of     Social History     Socioeconomic History     Marital status:      Spouse name: Not on file     Number of children: Not on file     Years of education: Not on file     Highest education level: Not on file   Occupational History     Not on file   Tobacco Use     Smoking status: Former     Years: 10.00     Types: Cigarettes     Start date: 9/10/1972     Quit date: 1991     Years since quittin.3     Smokeless tobacco: Never   Vaping Use     Vaping Use: Never used   Substance and Sexual Activity     Alcohol use: Yes     Comment: Occasionally     Drug use: No     Sexual activity: Yes     Partners: Male     Birth control/protection: None   Other Topics Concern     Parent/sibling w/ CABG, MI or angioplasty before 65F 55M? Not Asked   Social History Narrative     Not on file     Social Determinants of Health     Financial Resource Strain: Not on file   Food Insecurity: Not on file   Transportation Needs: Not on file   Physical Activity: Not on file   Stress: Not on file   Social Connections: Not on file   Intimate Partner Violence: Not on file   Housing Stability: Not on file          Medications  Allergies   Current Outpatient Medications   Medication Sig Dispense Refill     aspirin 81 MG EC tablet Take 81 mg by mouth daily 90 tablet 3     CALCIUM PO Take 2 tablets by mouth daily       clobetasol (TEMOVATE) 0.05 % external ointment Apply sparingly twice daily as need for first week to affected area on groin then use 1-2 time weekly to help prevent flaring. 30 g 3     DILT- MG 24 hr capsule TAKE 1 CAPSULE BY MOUTH EVERY DAY 90 capsule 1     estradiol (CLIMARA) 0.05 MG/24HR weekly patch APPLY 1 PATCH TO SKIN TRANSDERMALLY WEEKLY       L-Tryptophan 500 MG TABS Take 500 mg by mouth 2 times daily        LORazepam (ATIVAN) 0.5 MG tablet Take 1 at onset of panic attack. May repeat in 8 hours if needed. Max 20 tabs per month 20 tablet 2     metoprolol succinate ER (TOPROL XL) 50 MG 24 hr tablet TAKE 1 TABLET BY MOUTH EVERY DAY 90 tablet 0     OVER-THE-COUNTER Take 200 mg by mouth 3 times daily L-theanine       progesterone (PROMETRIUM) 100 MG capsule TAKE 1 CAPSULE ORALLY ONCE A DAY       STATIN NOT PRESCRIBED (INTENTIONAL) Please choose reason not prescribed from choices below.       thyroid (ARMOUR) 30 MG tablet Take 30 mg by mouth daily       Ubiquinol 100 MG CAPS Take 1 capsule by mouth daily       Vitamin D-Vitamin K (VITAMIN K2-VITAMIN D3 PO) 3 drops daily D3 = 1208, K = 25 mcg      Allergies   Allergen Reactions     Kiwi Anaphylaxis     Milk Protein Extract Other (See Comments)     Contraindicated due to Thyroid, Hashimoto     Egg White (Diagnostic) Other (See Comments)     tested     Lactose Other (See Comments)     Contraindicated due to Thyroid, Hashimoto     Magnesium GI Disturbance     Other (Do Not Use) Anxiety, Other (See Comments), Diarrhea, Cramps and GI Disturbance     Hydroxizine name of medication     Remeron [Mirtazapine] Rash     Patient states that medication made her extremly tired, felt like she could not function until late afternoon.  Also noted having rash all over body         Lab Results    Chemistry/lipid CBC Cardiac Enzymes/BNP/TSH/INR   Lab Results   Component Value Date    CHOL 178 03/09/2020    HDL 63 03/09/2020    TRIG 118 03/09/2020    BUN 24 07/13/2022     07/13/2022    CO2 31 07/13/2022    Lab Results   Component Value Date    WBC 4.2 07/13/2022    HGB 12.4 07/13/2022    HCT 39.4 07/13/2022    MCV 98 07/13/2022     07/13/2022    Lab Results   Component Value Date    TSH 2.87 02/02/2022                Thank you for allowing me to participate in the care of your patient.      Sincerely,     Eugene Campbell MD     [facility]  [facility]  cc:   Eugene Campbell MD  [address] [facility] [address]  [place] [address]

## 2023-01-25 NOTE — PROGRESS NOTES
[facility]  [phone]      Assessment/Recommendations   Patient with history of probable coronary artery dissection with small wall motion normality and abnormal troponin.  She has been stable for quite some time now without any recurrence of symptoms.  Her blood pressure is well controlled.  She has not been exercising as much as she would like but tells me they are going to be purchasing a treadmill and will start walking on the treadmill.  I recommended brisk walking for 30 minutes and at least 5 times each week.    If she remains stable I like to see her back in 2 years, but of course to be happy to see her sooner if questions or problems arise.       History of Present Illness/Subjective    Ms. Davida Mo is a 62 year old female with known history of probable coronary artery dissection.  She has been stable since her last visit.  She has not had any chest discomfort.  She denies any shortness of breath which is unusual.  She denies syncopal or near syncopal episodes, orthopnea, paroxysmal nocturnal dyspnea, peripheral edema.  Blood pressures have been running good.     Physical Examination Review of Systems   /72 (BP Location: Right arm, Patient Position: Sitting, Cuff Size: Adult Regular)   Pulse 64   Resp 14   Wt 57.2 kg (126 lb)   BMI 23.05 kg/m    Body mass index is 23.05 kg/m .  Wt Readings from Last 3 Encounters:   01/25/23 57.2 kg (126 lb)   06/22/22 55 kg (121 lb 3.2 oz)   06/10/22 54.9 kg (121 lb)     General Appearance:   Alert, cooperative and in no acute distress.   ENT/Mouth: Patient wearing a mask.      EYES:  no scleral icterus, normal conjunctivae   Neck: JVP normal. No Hepatojugular reflux. Thyroid not visualized.   Chest/Lungs:   Lungs are clear to auscultation, equal chest wall expansion.   Cardiovascular:   S1, S2 without murmur ,clicks or rubs. Brachial, radial  pulses are intact and symetric. No carotid bruits noted   Abdomen:  Nontender.     Extremities: No cyanosis, clubbing or edema   Skin: no xanthelasma, warm.    Neurologic: normal arm movement bilateral, no tremors     Psychiatric: Appropriate affect.      Enc Vitals  BP: 118/72  Pulse: 64  Resp: 14  Weight: 57.2 kg (126 lb)                                           Medical History  Surgical History Family History Social History   Past Medical History:   Diagnosis Date     Arthritis      Depression     on meds from 96 to 06. no symptoms have returned     Hypertension      IBS (irritable bowel syndrome)      Motion sickness      Need for prophylactic hormone replacement therapy (postmenopausal)      NSTEMI (non-ST elevated myocardial infarction) (H) 2011,9/23/2014    see care everywhere     Prinzmetal angina (H)     first heart attack. prinzmetal angina diagnosis     Thyroid disease     Past Surgical History:   Procedure Laterality Date     BUNIONECTOMY RT/LT       DILATION AND CURETTAGE       DILATION AND CURETTAGE, OPERATIVE HYSTEROSCOPY, COMBINED N/A 12/20/2021    Procedure: HYSTEROSCOPY, POLYPECTOMY;  Surgeon: Sebastian Mcguire MD;  Location: [place] [facility]     TUBAL LIGATION       TUBAL LIGATION      Family History   Problem Relation Age of Onset     Thyroid Disease Sister      Lung Cancer Sister 67        Lung Cancer, removal of left lower lung     Diabetes Mother      Hypertension Mother      Allergies Mother      Arthritis Mother      Eye Disorder Mother      Gastrointestinal Disease Mother         diverticulitis     Osteoporosis Mother         and spinal stenosis     Asthma Mother      Atrial fibrillation Mother      Diverticulitis Mother      Diabetes Type 2  Mother 70.00     Heart Disease Father      Alcohol/Drug Father      Irritable Bowel Syndrome Father         possibly     Heart Failure Father 71.00     Cancer Sister      Neurologic Disorder Sister         brain tumor / cancer     Bone Cancer Sister      Cancer Sister      Parkinsonism Brother      Breast Cancer No family hx  of     Social History     Socioeconomic History     Marital status:      Spouse name: Not on file     Number of children: Not on file     Years of education: Not on file     Highest education level: Not on file   Occupational History     Not on file   Tobacco Use     Smoking status: Former     Years: 10.00     Types: Cigarettes     Start date: 9/10/1972     Quit date: 1991     Years since quittin.3     Smokeless tobacco: Never   Vaping Use     Vaping Use: Never used   Substance and Sexual Activity     Alcohol use: Yes     Comment: Occasionally     Drug use: No     Sexual activity: Yes     Partners: Male     Birth control/protection: None   Other Topics Concern     Parent/sibling w/ CABG, MI or angioplasty before 65F 55M? Not Asked   Social History Narrative     Not on file     Social Determinants of Health     Financial Resource Strain: Not on file   Food Insecurity: Not on file   Transportation Needs: Not on file   Physical Activity: Not on file   Stress: Not on file   Social Connections: Not on file   Intimate Partner Violence: Not on file   Housing Stability: Not on file          Medications  Allergies   Current Outpatient Medications   Medication Sig Dispense Refill     aspirin 81 MG EC tablet Take 81 mg by mouth daily 90 tablet 3     CALCIUM PO Take 2 tablets by mouth daily       clobetasol (TEMOVATE) 0.05 % external ointment Apply sparingly twice daily as need for first week to affected area on groin then use 1-2 time weekly to help prevent flaring. 30 g 3     DILT- MG 24 hr capsule TAKE 1 CAPSULE BY MOUTH EVERY DAY 90 capsule 1     estradiol (CLIMARA) 0.05 MG/24HR weekly patch APPLY 1 PATCH TO SKIN TRANSDERMALLY WEEKLY       L-Tryptophan 500 MG TABS Take 500 mg by mouth 2 times daily       LORazepam (ATIVAN) 0.5 MG tablet Take 1 at onset of panic attack. May repeat in 8 hours if needed. Max 20 tabs per month 20 tablet 2     metoprolol succinate ER (TOPROL XL) 50 MG 24 hr tablet TAKE 1  TABLET BY MOUTH EVERY DAY 90 tablet 0     OVER-THE-COUNTER Take 200 mg by mouth 3 times daily L-theanine       progesterone (PROMETRIUM) 100 MG capsule TAKE 1 CAPSULE ORALLY ONCE A DAY       STATIN NOT PRESCRIBED (INTENTIONAL) Please choose reason not prescribed from choices below.       thyroid (ARMOUR) 30 MG tablet Take 30 mg by mouth daily       Ubiquinol 100 MG CAPS Take 1 capsule by mouth daily       Vitamin D-Vitamin K (VITAMIN K2-VITAMIN D3 PO) 3 drops daily D3 = 1208, K = 25 mcg      Allergies   Allergen Reactions     Kiwi Anaphylaxis     Milk Protein Extract Other (See Comments)     Contraindicated due to Thyroid, Hashimoto     Egg White (Diagnostic) Other (See Comments)     tested     Lactose Other (See Comments)     Contraindicated due to Thyroid, Hashimoto     Magnesium GI Disturbance     Other (Do Not Use) Anxiety, Other (See Comments), Diarrhea, Cramps and GI Disturbance     Hydroxizine name of medication     Remeron [Mirtazapine] Rash     Patient states that medication made her extremly tired, felt like she could not function until late afternoon.  Also noted having rash all over body         Lab Results    Chemistry/lipid CBC Cardiac Enzymes/BNP/TSH/INR   Lab Results   Component Value Date    CHOL 178 03/09/2020    HDL 63 03/09/2020    TRIG 118 03/09/2020    BUN 24 07/13/2022     07/13/2022    CO2 31 07/13/2022    Lab Results   Component Value Date    WBC 4.2 07/13/2022    HGB 12.4 07/13/2022    HCT 39.4 07/13/2022    MCV 98 07/13/2022     07/13/2022    Lab Results   Component Value Date    TSH 2.87 02/02/2022

## 2023-01-26 RX ORDER — METOPROLOL SUCCINATE 50 MG/1
TABLET, EXTENDED RELEASE ORAL
Qty: 90 TABLET | Refills: 4 | Status: SHIPPED | OUTPATIENT
Start: 2023-01-26 | End: 2024-04-18

## 2023-03-13 DIAGNOSIS — I10 ESSENTIAL HYPERTENSION: Primary | ICD-10-CM

## 2023-03-13 DIAGNOSIS — E04.1 THYROID NODULE: ICD-10-CM

## 2023-03-13 DIAGNOSIS — Z13.220 SCREENING FOR LIPOID DISORDERS: ICD-10-CM

## 2023-03-13 NOTE — PROGRESS NOTES
"Davida Mo has an upcoming lab appointment:    Future Appointments   Date Time Provider Department Center   3/16/2023 11:45 AM [facility]   3/28/2023 12:45 PM CONSTANTINE DIAS [facility]   4/19/2023  1:30 PM Paulina Zamora PA-C [facility]     Patient is scheduled for the following lab(s): Patient lab appointment note states, \"Cholesterol and TSH due per patient in MyChart.\"    There is no order available. Please review and place either future orders or HMPO (Review of Health Maintenance Protocol Orders), as appropriate.    Health Maintenance Due   Topic     LIPID      ANNUAL REVIEW OF HM ORDERS      TSH W/FREE T4 REFLEX      Thank you!  Cristine Silva    "

## 2023-03-16 ENCOUNTER — LAB (OUTPATIENT)
Dept: LAB | Facility: CLINIC | Age: 63
End: 2023-03-16
Payer: COMMERCIAL

## 2023-03-16 DIAGNOSIS — I10 ESSENTIAL HYPERTENSION: ICD-10-CM

## 2023-03-16 DIAGNOSIS — Z13.220 SCREENING FOR LIPOID DISORDERS: ICD-10-CM

## 2023-03-16 DIAGNOSIS — E04.1 THYROID NODULE: ICD-10-CM

## 2023-03-16 LAB
ANION GAP SERPL CALCULATED.3IONS-SCNC: 10 MMOL/L (ref 7–15)
BUN SERPL-MCNC: 24.3 MG/DL (ref 8–23)
CALCIUM SERPL-MCNC: 9.6 MG/DL (ref 8.8–10.2)
CHLORIDE SERPL-SCNC: 104 MMOL/L (ref 98–107)
CHOLEST SERPL-MCNC: 186 MG/DL
CREAT SERPL-MCNC: 0.92 MG/DL (ref 0.51–0.95)
DEPRECATED HCO3 PLAS-SCNC: 30 MMOL/L (ref 22–29)
GFR SERPL CREATININE-BSD FRML MDRD: 70 ML/MIN/1.73M2
GLUCOSE SERPL-MCNC: 102 MG/DL (ref 70–99)
HDLC SERPL-MCNC: 60 MG/DL
LDLC SERPL CALC-MCNC: 109 MG/DL
NONHDLC SERPL-MCNC: 126 MG/DL
POTASSIUM SERPL-SCNC: 4.1 MMOL/L (ref 3.4–5.3)
SODIUM SERPL-SCNC: 144 MMOL/L (ref 136–145)
T3FREE SERPL-MCNC: 3.9 PG/ML (ref 2–4.4)
TRIGL SERPL-MCNC: 87 MG/DL
TSH SERPL DL<=0.005 MIU/L-ACNC: 2.31 UIU/ML (ref 0.3–4.2)

## 2023-03-16 PROCEDURE — 36415 COLL VENOUS BLD VENIPUNCTURE: CPT

## 2023-03-16 PROCEDURE — 84443 ASSAY THYROID STIM HORMONE: CPT

## 2023-03-16 PROCEDURE — 80061 LIPID PANEL: CPT

## 2023-03-16 PROCEDURE — 80048 BASIC METABOLIC PNL TOTAL CA: CPT

## 2023-03-16 PROCEDURE — 84481 FREE ASSAY (FT-3): CPT

## 2023-03-17 DIAGNOSIS — I10 ESSENTIAL HYPERTENSION: Primary | ICD-10-CM

## 2023-03-28 ENCOUNTER — HOSPITAL ENCOUNTER (OUTPATIENT)
Dept: MAMMOGRAPHY | Facility: CLINIC | Age: 63
Discharge: HOME | End: 2023-03-28
Admitting: PHYSICIAN ASSISTANT
Payer: COMMERCIAL

## 2023-03-28 DIAGNOSIS — Z12.31 VISIT FOR SCREENING MAMMOGRAM: ICD-10-CM

## 2023-03-28 PROCEDURE — 77067 SCR MAMMO BI INCL CAD: CPT

## 2023-03-30 ENCOUNTER — MYC MEDICAL ADVICE (OUTPATIENT)
Dept: FAMILY MEDICINE | Facility: CLINIC | Age: 63
End: 2023-03-30
Payer: COMMERCIAL

## 2023-04-19 ENCOUNTER — OFFICE VISIT (OUTPATIENT)
Dept: FAMILY MEDICINE | Facility: CLINIC | Age: 63
End: 2023-04-19
Payer: COMMERCIAL

## 2023-04-19 VITALS
DIASTOLIC BLOOD PRESSURE: 82 MMHG | RESPIRATION RATE: 16 BRPM | SYSTOLIC BLOOD PRESSURE: 134 MMHG | HEART RATE: 66 BPM | WEIGHT: 126.5 LBS | HEIGHT: 62 IN | TEMPERATURE: 98.1 F | OXYGEN SATURATION: 97 % | BODY MASS INDEX: 23.28 KG/M2

## 2023-04-19 DIAGNOSIS — I10 ESSENTIAL HYPERTENSION: ICD-10-CM

## 2023-04-19 DIAGNOSIS — E04.1 THYROID NODULE: ICD-10-CM

## 2023-04-19 DIAGNOSIS — M25.541 JOINT PAIN IN FINGERS OF RIGHT HAND: ICD-10-CM

## 2023-04-19 DIAGNOSIS — K58.0 IRRITABLE BOWEL SYNDROME WITH DIARRHEA: ICD-10-CM

## 2023-04-19 DIAGNOSIS — G89.29 CHRONIC PAIN OF BOTH KNEES: ICD-10-CM

## 2023-04-19 DIAGNOSIS — M25.562 CHRONIC PAIN OF BOTH KNEES: ICD-10-CM

## 2023-04-19 DIAGNOSIS — Z00.00 ENCOUNTER FOR ROUTINE ADULT HEALTH EXAMINATION WITHOUT ABNORMAL FINDINGS: Primary | ICD-10-CM

## 2023-04-19 DIAGNOSIS — M25.561 CHRONIC PAIN OF BOTH KNEES: ICD-10-CM

## 2023-04-19 DIAGNOSIS — I20.1 PRINZMETAL ANGINA (H): ICD-10-CM

## 2023-04-19 DIAGNOSIS — I25.42 DISSECTION OF CORONARY ARTERY: ICD-10-CM

## 2023-04-19 LAB
ANION GAP SERPL CALCULATED.3IONS-SCNC: 8 MMOL/L (ref 7–15)
BUN SERPL-MCNC: 22.8 MG/DL (ref 8–23)
CALCIUM SERPL-MCNC: 9.8 MG/DL (ref 8.8–10.2)
CHLORIDE SERPL-SCNC: 104 MMOL/L (ref 98–107)
CREAT SERPL-MCNC: 0.93 MG/DL (ref 0.51–0.95)
DEPRECATED HCO3 PLAS-SCNC: 30 MMOL/L (ref 22–29)
GFR SERPL CREATININE-BSD FRML MDRD: 69 ML/MIN/1.73M2
GLUCOSE SERPL-MCNC: 95 MG/DL (ref 70–99)
POTASSIUM SERPL-SCNC: 4.3 MMOL/L (ref 3.4–5.3)
SODIUM SERPL-SCNC: 142 MMOL/L (ref 136–145)

## 2023-04-19 PROCEDURE — 99396 PREV VISIT EST AGE 40-64: CPT | Performed by: PHYSICIAN ASSISTANT

## 2023-04-19 PROCEDURE — 36415 COLL VENOUS BLD VENIPUNCTURE: CPT | Performed by: PHYSICIAN ASSISTANT

## 2023-04-19 PROCEDURE — 80048 BASIC METABOLIC PNL TOTAL CA: CPT | Performed by: PHYSICIAN ASSISTANT

## 2023-04-19 RX ORDER — THYROID 30 MG/1
30 TABLET ORAL DAILY
COMMUNITY

## 2023-04-19 ASSESSMENT — ENCOUNTER SYMPTOMS
FREQUENCY: 0
ARTHRALGIAS: 1
NAUSEA: 0
HEMATOCHEZIA: 0
DIARRHEA: 0
CHILLS: 0
DYSURIA: 0
JOINT SWELLING: 1
HEMATURIA: 0
HEADACHES: 0
SHORTNESS OF BREATH: 0
WEAKNESS: 0
FEVER: 0
NERVOUS/ANXIOUS: 0
PARESTHESIAS: 0
ABDOMINAL PAIN: 0
BREAST MASS: 0
HEARTBURN: 0
EYE PAIN: 0
MYALGIAS: 0
PALPITATIONS: 0
SORE THROAT: 0
DIZZINESS: 0
CONSTIPATION: 0
COUGH: 0

## 2023-04-19 NOTE — PATIENT INSTRUCTIONS
Mini squats, mini lunges, straight leg raise  Consider physical therapy, ortho consult for hand/knees

## 2023-04-19 NOTE — PROGRESS NOTES
SUBJECTIVE:   CC: Davida is an 62 year old who presents for preventive health visit.        View : No data to display.            Patient has been advised of split billing requirements and indicates understanding: Yes  Healthy Habits:     Getting at least 3 servings of Calcium per day:  Yes    Bi-annual eye exam:  Yes    Dental care twice a year:  Yes    Sleep apnea or symptoms of sleep apnea:  None    Diet:  Other    Frequency of exercise:  2-3 days/week    Duration of exercise:  30-45 minutes    Taking medications regularly:  Yes    Medication side effects:  None    PHQ-2 Total Score: 0    Additional concerns today:  No      PAP 2021 [facility]     She does a lot of gardening, painting. Having joint pain - right hand 1st and middle finger joint pain/swelling  Bilateral anterior knees around kneecap  - left worse than right. Painful with bending, kneeling, ladders, stairs.    /IBS: low fodmaps diet x2 years. Treated for SIBO    Today's PHQ-2 Score:       2023     1:13 PM   PHQ-2 (  [employer])   Q1: Little interest or pleasure in doing things 0   Q2: Feeling down, depressed or hopeless 0   PHQ-2 Score 0   Q1: Little interest or pleasure in doing things Not at all    Not at all   Q2: Feeling down, depressed or hopeless Not at all    Not at all   PHQ-2 Score 0    0       Have you ever done Advance Care Planning? (For example, a Health Directive, POLST, or a discussion with a medical provider or your loved ones about your wishes): No, advance care planning information given to patient to review.  Patient plans to discuss their wishes with loved ones or provider.      Social History     Tobacco Use     Smoking status: Former     Years: 10.00     Types: Cigarettes     Start date: 9/10/1972     Quit date: 1991     Years since quittin.5     Smokeless tobacco: Never   Vaping Use     Vaping status: Never Used   Substance Use Topics     Alcohol use: Yes     Comment: Occasionally             2023      1:13 PM   Alcohol Use   Prescreen: >3 drinks/day or >7 drinks/week? No          View : No data to display.              Reviewed orders with patient.  Reviewed health maintenance and updated orders accordingly - Yes  Lab work is in process  Labs reviewed in EPIC  BP Readings from Last 3 Encounters:   23 134/82   23 118/72   22 110/63    Wt Readings from Last 3 Encounters:   23 57.4 kg (126 lb 8 oz)   23 57.2 kg (126 lb)   22 55 kg (121 lb 3.2 oz)                  Patient Active Problem List   Diagnosis     Anxiety     Lentigo     Seborrheic keratosis     Atrophic vaginitis     Chest pain     Essential hypertension     Thyroid nodule     Prinzmetal angina (H)     Dissection of coronary artery     Irritable bowel syndrome with diarrhea     Past Surgical History:   Procedure Laterality Date     BUNIONECTOMY RT/LT       DILATION AND CURETTAGE       DILATION AND CURETTAGE, OPERATIVE HYSTEROSCOPY, COMBINED N/A 2021    Procedure: HYSTEROSCOPY, POLYPECTOMY;  Surgeon: Sebastian Mcguire MD;  Location: [place] [facility]     TUBAL LIGATION       TUBAL LIGATION         Social History     Tobacco Use     Smoking status: Former     Years: 10.00     Types: Cigarettes     Start date: 9/10/1972     Quit date: 1991     Years since quittin.5     Smokeless tobacco: Never   Vaping Use     Vaping status: Never Used   Substance Use Topics     Alcohol use: Yes     Comment: Occasionally     Family History   Problem Relation Age of Onset     Thyroid Disease Sister      Lung Cancer Sister 67        Lung Cancer, removal of left lower lung     Diabetes Mother      Hypertension Mother      Allergies Mother      Arthritis Mother      Eye Disorder Mother      Gastrointestinal Disease Mother         diverticulitis     Osteoporosis Mother         and spinal stenosis     Asthma Mother      Atrial fibrillation Mother      Diverticulitis Mother      Diabetes Type 2  Mother 70.00     Heart  Disease Father      Alcohol/Drug Father      Irritable Bowel Syndrome Father         possibly     Heart Failure Father 71.00     Cancer Sister      Neurologic Disorder Sister         brain tumor / cancer     Bone Cancer Sister      Cancer Sister      Parkinsonism Brother      Breast Cancer No family hx of            Breast Cancer Screening:  Any new diagnosis of family breast, ovarian, or bowel cancer? No    FHS-7:       11/4/2021     5:47 PM 3/28/2023    12:36 PM   Breast CA Risk Assessment (FHS-7)   Did any of your first-degree relatives have breast or ovarian cancer? No No   Did any of your relatives have bilateral breast cancer? No No   Did any man in your family have breast cancer? No No   Did any woman in your family have breast and ovarian cancer? No No   Did any woman in your family have breast cancer before age 50 y? No No   Do you have 2 or more relatives with breast and/or ovarian cancer? No No   Do you have 2 or more relatives with breast and/or bowel cancer? No No     Mammogram Screening: Recommended mammography every 1-2 years with patient discussion and risk factor consideration  Pertinent mammograms are reviewed under the imaging tab.    History of abnormal Pap smear: NO - age 30-65 PAP every 5 years with negative HPV co-testing recommended      Latest Ref Rng & Units 6/30/2016     2:07 PM 6/30/2016    12:00 AM   PAP / HPV   PAP (Historical)   NIL     HPV 16 DNA NEG Negative      HPV 18 DNA NEG Negative      Other HR HPV NEG Negative        Reviewed and updated as needed this visit by clinical staff   Tobacco  Allergies  Meds  Problems  Med Hx  Surg Hx  Fam Hx  Soc   Hx        Reviewed and updated as needed this visit by Provider   Tobacco  Allergies  Meds  Problems  Med Hx  Surg Hx  Fam Hx         Past Medical History:   Diagnosis Date     Arthritis      Depression     on meds from 96 to 06. no symptoms have returned     Hypertension      IBS (irritable bowel syndrome)      Motion  "sickness      Need for prophylactic hormone replacement therapy (postmenopausal)      NSTEMI (non-ST elevated myocardial infarction) (H) 2011,9/23/2014    see care everywhere     Prinzmetal angina (H)     first heart attack. prinzmetal angina diagnosis     Thyroid disease       Past Surgical History:   Procedure Laterality Date     BUNIONECTOMY RT/LT       DILATION AND CURETTAGE       DILATION AND CURETTAGE, OPERATIVE HYSTEROSCOPY, COMBINED N/A 12/20/2021    Procedure: HYSTEROSCOPY, POLYPECTOMY;  Surgeon: Sebastian Mcguire MD;  Location: [place] [facility]     TUBAL LIGATION       TUBAL LIGATION         Review of Systems   Constitutional: Negative for chills and fever.   HENT: Negative for congestion, ear pain, hearing loss and sore throat.    Eyes: Negative for pain and visual disturbance.   Respiratory: Negative for cough and shortness of breath.    Cardiovascular: Negative for chest pain, palpitations and peripheral edema.   Gastrointestinal: Negative for abdominal pain, constipation, diarrhea, heartburn, hematochezia and nausea.   Breasts:  Negative for tenderness, breast mass and discharge.   Genitourinary: Negative for dysuria, frequency, genital sores, hematuria, pelvic pain, urgency, vaginal bleeding and vaginal discharge.   Musculoskeletal: Positive for arthralgias and joint swelling. Negative for myalgias.   Skin: Negative for rash.   Neurological: Negative for dizziness, weakness, headaches and paresthesias.   Psychiatric/Behavioral: Negative for mood changes. The patient is not nervous/anxious.         OBJECTIVE:   /82   Pulse 66   Temp 98.1  F (36.7  C) (Tympanic)   Resp 16   Ht 1.575 m (5' 2.01\")   Wt 57.4 kg (126 lb 8 oz)   SpO2 97%   BMI 23.13 kg/m    Physical Exam  GENERAL: healthy, alert and no distress  EYES: Eyes grossly normal to inspection, PERRL and conjunctivae and sclerae normal  HENT: ear canals and TM's normal, nose and mouth without ulcers or lesions  NECK: no adenopathy, " no asymmetry, masses, or scars and thyroid normal to palpation  RESP: lungs clear to auscultation - no rales, rhonchi or wheezes  CV: regular rate and rhythm, normal S1 S2, no S3 or S4, no murmur, click or rub, no peripheral edema and peripheral pulses strong  ABDOMEN: soft, nontender, no hepatosplenomegaly, no masses and bowel sounds normal  MS: no gross musculoskeletal defects noted, no edema  POSITIVE right hand enlarged tender joints of pointer and middle fingers. No signs of infection.  POSITIVE bilateral knees full ROM, normal ligament testing, no joint line or other tenderness  SKIN: no suspicious lesions or rashes  NEURO: Normal strength and tone, mentation intact and speech normal  BACK: no CVA tenderness, no paralumbar tenderness  PSYCH: mentation appears normal, affect normal/bright  LYMPH: no cervical, supraclavicular, axillary, or inguinal adenopathy    Diagnostic Test Results:  Labs reviewed in Epic    ASSESSMENT/PLAN:     ASSESSMENT/PLAN:      ICD-10-CM    1. Encounter for routine adult health examination without abnormal findings  Z00.00       2. Prinzmetal angina (H)  I20.1       3. Essential hypertension  I10 Basic metabolic panel  (Ca, Cl, CO2, Creat, Gluc, K, Na, BUN)      4. Thyroid nodule  E04.1       5. Dissection of coronary artery  I25.42       6. Irritable bowel syndrome with diarrhea  K58.0       7. Chronic pain of both knees  M25.561     M25.562     G89.29       8. Joint pain in fingers of right hand  M25.541         Angina/htn/dissection of coronary artery: follows with cardiology    Thyroid/IBS: low fodmaps diet x2 years. Follows with [facility].    Patient Instructions   Mini squats, mini lunges, straight leg raise  Consider physical therapy, ortho consult for hand/knees            COUNSELING:  Reviewed preventive health counseling, as reflected in patient instructions       Regular exercise       Healthy diet/nutrition       Pneumococcal Vaccine     She reports  that she quit smoking about 31 years ago. Her smoking use included cigarettes. She started smoking about 50 years ago. She has never used smokeless tobacco.      [facility]

## 2023-05-12 NOTE — PATIENT INSTRUCTIONS
[facility] Medicine Services  DISCHARGE SUMMARY    Patient Name: Steve Mott  : 1950  MRN: [id number]    5/10/23 per   Consider physical therapy   Massage  or chiropractor/acupuncture with Dr. Armando Norman at [place]  Let me know if you'd like referral   [place]    Danielle Peacock PA-C [facility]       Additional Instructions for the Follow-ups that You Need to Schedule     Discharge Follow-up with Specified Provider: Dr. Joseph as scheduled   As directed      To: Dr. Joseph as scheduled                     Manuela Timmons 

## 2023-06-08 DIAGNOSIS — I10 ESSENTIAL HYPERTENSION: ICD-10-CM

## 2023-06-08 RX ORDER — DILTIAZEM HYDROCHLORIDE 180 MG/1
CAPSULE, EXTENDED RELEASE ORAL
Qty: 90 CAPSULE | Refills: 2 | Status: SHIPPED | OUTPATIENT
Start: 2023-06-08 | End: 2024-02-28

## 2023-10-31 ENCOUNTER — MYC MEDICAL ADVICE (OUTPATIENT)
Dept: FAMILY MEDICINE | Facility: CLINIC | Age: 63
End: 2023-10-31
Payer: COMMERCIAL

## 2023-10-31 DIAGNOSIS — E04.1 THYROID NODULE: Primary | ICD-10-CM

## 2023-11-09 ENCOUNTER — HOSPITAL ENCOUNTER (OUTPATIENT)
Dept: ULTRASOUND IMAGING | Facility: CLINIC | Age: 63
Discharge: HOME | End: 2023-11-09
Attending: PHYSICIAN ASSISTANT | Admitting: PHYSICIAN ASSISTANT
Payer: COMMERCIAL

## 2023-11-09 DIAGNOSIS — E04.1 THYROID NODULE: ICD-10-CM

## 2023-11-09 PROCEDURE — 76536 US EXAM OF HEAD AND NECK: CPT

## 2024-02-27 DIAGNOSIS — I10 ESSENTIAL HYPERTENSION: ICD-10-CM

## 2024-02-28 RX ORDER — DILTIAZEM HYDROCHLORIDE 180 MG/1
CAPSULE, EXTENDED RELEASE ORAL
Qty: 90 CAPSULE | Refills: 3 | Status: SHIPPED | OUTPATIENT
Start: 2024-02-28

## 2024-03-04 ENCOUNTER — MYC REFILL (OUTPATIENT)
Dept: FAMILY MEDICINE | Facility: CLINIC | Age: 64
End: 2024-03-04
Payer: COMMERCIAL

## 2024-03-04 DIAGNOSIS — F41.9 ANXIETY: ICD-10-CM

## 2024-03-04 DIAGNOSIS — E04.1 THYROID NODULE: Primary | ICD-10-CM

## 2024-03-04 DIAGNOSIS — F43.10 PTSD (POST-TRAUMATIC STRESS DISORDER): ICD-10-CM

## 2024-03-04 DIAGNOSIS — I25.42 DISSECTION OF CORONARY ARTERY: ICD-10-CM

## 2024-03-04 DIAGNOSIS — I10 ESSENTIAL HYPERTENSION: ICD-10-CM

## 2024-03-04 RX ORDER — LORAZEPAM 0.5 MG/1
TABLET ORAL
Qty: 20 TABLET | Refills: 0 | Status: SHIPPED | OUTPATIENT
Start: 2024-03-04 | End: 2024-06-30

## 2024-03-04 NOTE — TELEPHONE ENCOUNTER
Patient is wondering if they would need to do their PE appointment at that time as they will be eligible for medicare in October.     Also, Pt is hoping to come in on 4/8 to get labs for thyroid check done, but need to get orders into chart for this.

## 2024-03-04 NOTE — TELEPHONE ENCOUNTER
Put in orders for yearly labs. She can schedule regular medication check if she'd like instead of a physical. But last visit was 4/19/23 so should be seen this spring.  Kirstin Zamora PA-C

## 2024-03-20 ENCOUNTER — PATIENT OUTREACH (OUTPATIENT)
Dept: CARE COORDINATION | Facility: CLINIC | Age: 64
End: 2024-03-20
Payer: COMMERCIAL

## 2024-03-24 ENCOUNTER — E-VISIT (OUTPATIENT)
Dept: FAMILY MEDICINE | Facility: CLINIC | Age: 64
End: 2024-03-24
Payer: COMMERCIAL

## 2024-03-24 DIAGNOSIS — H69.91 DYSFUNCTION OF RIGHT EUSTACHIAN TUBE: Primary | ICD-10-CM

## 2024-03-24 PROCEDURE — 99421 OL DIG E/M SVC 5-10 MIN: CPT | Performed by: PHYSICIAN ASSISTANT

## 2024-03-28 ENCOUNTER — LAB (OUTPATIENT)
Dept: LAB | Facility: CLINIC | Age: 64
End: 2024-03-28
Payer: COMMERCIAL

## 2024-03-28 ENCOUNTER — DOCUMENTATION ONLY (OUTPATIENT)
Dept: FAMILY MEDICINE | Facility: CLINIC | Age: 64
End: 2024-03-28

## 2024-03-28 DIAGNOSIS — E04.1 THYROID NODULE: Primary | ICD-10-CM

## 2024-03-28 DIAGNOSIS — I10 ESSENTIAL HYPERTENSION: ICD-10-CM

## 2024-03-28 DIAGNOSIS — I25.42 DISSECTION OF CORONARY ARTERY: ICD-10-CM

## 2024-03-28 LAB
ANION GAP SERPL CALCULATED.3IONS-SCNC: 7 MMOL/L (ref 7–15)
BUN SERPL-MCNC: 24 MG/DL (ref 8–23)
CALCIUM SERPL-MCNC: 9 MG/DL (ref 8.8–10.2)
CHLORIDE SERPL-SCNC: 104 MMOL/L (ref 98–107)
CHOLEST SERPL-MCNC: 175 MG/DL
CREAT SERPL-MCNC: 0.93 MG/DL (ref 0.51–0.95)
DEPRECATED HCO3 PLAS-SCNC: 30 MMOL/L (ref 22–29)
EGFRCR SERPLBLD CKD-EPI 2021: 68 ML/MIN/1.73M2
FASTING STATUS PATIENT QL REPORTED: YES
GLUCOSE SERPL-MCNC: 92 MG/DL (ref 70–99)
HDLC SERPL-MCNC: 50 MG/DL
HOLD SPECIMEN: NORMAL
LDLC SERPL CALC-MCNC: 102 MG/DL
NONHDLC SERPL-MCNC: 125 MG/DL
POTASSIUM SERPL-SCNC: 4.1 MMOL/L (ref 3.4–5.3)
SODIUM SERPL-SCNC: 141 MMOL/L (ref 135–145)
T4 FREE SERPL-MCNC: 1.09 NG/DL (ref 0.9–1.7)
TRIGL SERPL-MCNC: 113 MG/DL
TSH SERPL DL<=0.005 MIU/L-ACNC: 5.63 UIU/ML (ref 0.3–4.2)

## 2024-03-28 PROCEDURE — 36415 COLL VENOUS BLD VENIPUNCTURE: CPT

## 2024-03-28 PROCEDURE — 84481 FREE ASSAY (FT-3): CPT

## 2024-03-28 PROCEDURE — 84439 ASSAY OF FREE THYROXINE: CPT

## 2024-03-28 PROCEDURE — 84443 ASSAY THYROID STIM HORMONE: CPT

## 2024-03-28 PROCEDURE — 80048 BASIC METABOLIC PNL TOTAL CA: CPT

## 2024-03-28 PROCEDURE — 80061 LIPID PANEL: CPT

## 2024-03-28 NOTE — PROGRESS NOTES
"Good Morning,     Patient came in for labs and stated \"needing a T3 Free lab for a medication refill\". An extra tube was drawn, specimen was obtained. Please put in orders as needed.     Thanks  Crystal"

## 2024-03-29 LAB — T3FREE SERPL-MCNC: 3.8 PG/ML (ref 2–4.4)

## 2024-04-03 ENCOUNTER — PATIENT OUTREACH (OUTPATIENT)
Dept: CARE COORDINATION | Facility: CLINIC | Age: 64
End: 2024-04-03
Payer: COMMERCIAL

## 2024-04-08 ENCOUNTER — OFFICE VISIT (OUTPATIENT)
Dept: FAMILY MEDICINE | Facility: CLINIC | Age: 64
End: 2024-04-08
Payer: COMMERCIAL

## 2024-04-08 VITALS
BODY MASS INDEX: 23 KG/M2 | HEART RATE: 75 BPM | RESPIRATION RATE: 17 BRPM | OXYGEN SATURATION: 96 % | WEIGHT: 125 LBS | DIASTOLIC BLOOD PRESSURE: 86 MMHG | SYSTOLIC BLOOD PRESSURE: 130 MMHG | HEIGHT: 62 IN

## 2024-04-08 DIAGNOSIS — E04.1 THYROID NODULE: ICD-10-CM

## 2024-04-08 DIAGNOSIS — Z12.31 VISIT FOR SCREENING MAMMOGRAM: ICD-10-CM

## 2024-04-08 DIAGNOSIS — Z00.00 ENCOUNTER FOR ROUTINE ADULT HEALTH EXAMINATION WITHOUT ABNORMAL FINDINGS: Primary | ICD-10-CM

## 2024-04-08 DIAGNOSIS — R79.9 ELEVATED BUN: ICD-10-CM

## 2024-04-08 DIAGNOSIS — R07.9 CHEST PAIN, UNSPECIFIED TYPE: ICD-10-CM

## 2024-04-08 DIAGNOSIS — I25.42 DISSECTION OF CORONARY ARTERY: ICD-10-CM

## 2024-04-08 DIAGNOSIS — F41.9 ANXIETY: ICD-10-CM

## 2024-04-08 DIAGNOSIS — I20.1 PRINZMETAL ANGINA (H): ICD-10-CM

## 2024-04-08 PROCEDURE — 99396 PREV VISIT EST AGE 40-64: CPT | Performed by: PHYSICIAN ASSISTANT

## 2024-04-08 SDOH — HEALTH STABILITY: PHYSICAL HEALTH: ON AVERAGE, HOW MANY DAYS PER WEEK DO YOU ENGAGE IN MODERATE TO STRENUOUS EXERCISE (LIKE A BRISK WALK)?: 1 DAY

## 2024-04-08 ASSESSMENT — SOCIAL DETERMINANTS OF HEALTH (SDOH): HOW OFTEN DO YOU GET TOGETHER WITH FRIENDS OR RELATIVES?: ONCE A WEEK

## 2024-04-08 NOTE — PROGRESS NOTES
Preventive Care Visit  [facility] IOANA Zamora PA-C, Family Medicine  Apr 8, 2024      Assessment & Plan       ICD-10-CM    1. Encounter for routine adult health examination without abnormal findings  Z00.00       2. Thyroid nodule  E04.1 TSH with free T4 reflex     T4, free     T3, total      3. Prinzmetal angina (H24)  I20.1 Adult Cardiology Eval  Referral      4. Chest pain, unspecified type  R07.9       5. Anxiety  F41.9       6. Dissection of coronary artery  I25.42 Adult Cardiology Eval  Referral      7. Visit for screening mammogram  Z12.31 *MA Screening Digital Bilateral      8. Elevated BUN  R79.9 Basic metabolic panel  (Ca, Cl, CO2, Creat, Gluc, K, Na, BUN)            - hypothyroidism/thyroid nodule: ultrasound stable. TSH slightly high. Follows with [facility] [place]. She is seeing them in a couple days and will let me know if she adjusts medication. Has labwork checked through us.    - higher anxiety with travel recently. Has not needed ativan since return but was using more frequently.   Stress with returning to all the house projects, etc. Declines therapy/medication management. She feels  is a good support system.    - chest pain: had chest pain while on vacation she thinks from anxiety. Lasted hours to days with no associated symptoms. No SOB or swelling in legs. Has completely resolved. Not occurring with activity. She declines EKG today. Last cardiology visit 1/25/23, recommended schedule follow up. Be seen urgently if this returns.    - artery dissection/angina history: follow up with cardiology      Counseling  Appropriate preventive services were discussed with this patient, including applicable screening as appropriate for fall prevention, nutrition, physical activity, Tobacco-use cessation, weight loss and cognition.  Checklist reviewing preventive services available has been given to the patient.  Reviewed patient's diet,  addressing concerns and/or questions.   She is at risk for lack of exercise and has been provided with information to increase physical activity for the benefit of her well-being.       Subjective   Davida is a 63 year old, presenting for the following:  Physical        4/8/2024     1:42 PM   Additional Questions   Roomed by Emily   Accompanied by Self        Health Care Directive  Patient does not have a Health Care Directive or Living Will: Discussed advance care planning with patient; information given to patient to review.    HPI    The 10-year ASCVD risk score (Clint SMITH, et al., 2019) is: 6.7%    Values used to calculate the score:      Age: 63 years      Sex: Female      Is Non- : No      Diabetic: No      Tobacco smoker: No      Systolic Blood Pressure: 130 mmHg      Is BP treated: Yes      HDL Cholesterol: 50 mg/dL      Total Cholesterol: 175 mg/dL            4/8/2024   General Health   How would you rate your overall physical health? Good   Feel stress (tense, anxious, or unable to sleep) To some extent   (!) STRESS CONCERN      4/8/2024   Nutrition   Three or more servings of calcium each day? (!) I DON'T KNOW   Diet: Other   If other, please elaborate: low fodmap   How many servings of fruit and vegetables per day? (!) 2-3   How many sweetened beverages each day? 0-1         4/8/2024   Exercise   Days per week of moderate/strenous exercise 1 day   (!) EXERCISE CONCERN      4/8/2024   Social Factors   Frequency of gathering with friends or relatives Once a week   Worry food won't last until get money to buy more No   Food not last or not have enough money for food? No   Do you have housing?  Yes   Are you worried about losing your housing? No   Lack of transportation? No   Unable to get utilities (heat,electricity)? No         4/8/2024   Dental   Dentist two times every year? Yes         4/8/2024   TB Screening   Were you born outside of the [place]? No         Today's PHQ-2 Score:        2024     1:40 PM   PHQ-2 (  [employer])   Q1: Little interest or pleasure in doing things 1   Q2: Feeling down, depressed or hopeless 1   PHQ-2 Score 2   Q1: Little interest or pleasure in doing things Several days   Q2: Feeling down, depressed or hopeless Several days   PHQ-2 Score 2           2024   Substance Use   Alcohol more than 3/day or more than 7/wk No   Do you use any other substances recreationally? No     Social History     Tobacco Use    Smoking status: Former     Years: 10     Types: Cigarettes     Start date: 9/10/1972     Quit date: 1991     Years since quittin.5    Smokeless tobacco: Never   Vaping Use    Vaping Use: Never used   Substance Use Topics    Alcohol use: Yes     Comment: Occasionally    Drug use: No           3/28/2023   LAST FHS-7 RESULTS   1st degree relative breast or ovarian cancer No   Any relative bilateral breast cancer No   Any male have breast cancer No   Any ONE woman have BOTH breast AND ovarian cancer No   Any woman with breast cancer before 50yrs No   2 or more relatives with breast AND/OR ovarian cancer No   2 or more relatives with breast AND/OR bowel cancer No        Mammogram Screening - Mammogram every 1-2 years updated in Health Maintenance based on mutual decision making        2024   STI Screening   New sexual partner(s) since last STI/HIV test? No     History of abnormal Pap smear: NO - age 30-65 PAP every 5 years with negative HPV co-testing recommended        Latest Ref Rng & Units 2016     2:07 PM 2016    12:00 AM   PAP / HPV   PAP (Historical)   NIL    HPV 16 DNA NEG Negative     HPV 18 DNA NEG Negative     Other HR HPV NEG Negative       ASCVD Risk   The 10-year ASCVD risk score (Clint SMITH, et al., 2019) is: 6.7%    Values used to calculate the score:      Age: 63 years      Sex: Female      Is Non- : No      Diabetic: No      Tobacco smoker: No      Systolic Blood Pressure: 130 mmHg      Is  BP treated: Yes      HDL Cholesterol: 50 mg/dL      Total Cholesterol: 175 mg/dL           Reviewed and updated as needed this visit by Provider                    Past Medical History:   Diagnosis Date    Arthritis     Depression     on meds from 96 to 06. no symptoms have returned    Hypertension     IBS (irritable bowel syndrome)     Motion sickness     Need for prophylactic hormone replacement therapy (postmenopausal)     NSTEMI (non-ST elevated myocardial infarction) (H) 2011,9/23/2014    see care everywhere    Prinzmetal angina (H24)     first heart attack. prinzmetal angina diagnosis    Thyroid disease      Past Surgical History:   Procedure Laterality Date    BUNIONECTOMY RT/LT      DILATION AND CURETTAGE      DILATION AND CURETTAGE, OPERATIVE HYSTEROSCOPY, COMBINED N/A 12/20/2021    Procedure: HYSTEROSCOPY, POLYPECTOMY;  Surgeon: Sebastian Mcguire MD;  Location: [facility] OR    TUBAL LIGATION      TUBAL LIGATION       Labs reviewed in EPIC  BP Readings from Last 3 Encounters:   04/08/24 130/86   04/19/23 134/82   01/25/23 118/72    Wt Readings from Last 3 Encounters:   04/08/24 56.7 kg (125 lb)   04/19/23 57.4 kg (126 lb 8 oz)   01/25/23 57.2 kg (126 lb)                  Patient Active Problem List   Diagnosis    Anxiety    Lentigo    Seborrheic keratosis    Chest pain    Essential hypertension    Thyroid nodule    Prinzmetal angina (H24)    Dissection of coronary artery    Irritable bowel syndrome with diarrhea     Past Surgical History:   Procedure Laterality Date    BUNIONECTOMY RT/LT      DILATION AND CURETTAGE      DILATION AND CURETTAGE, OPERATIVE HYSTEROSCOPY, COMBINED N/A 12/20/2021    Procedure: HYSTEROSCOPY, POLYPECTOMY;  Surgeon: Sebastian Mcguire MD;  Location: [facility] OR    TUBAL LIGATION      TUBAL LIGATION         Social History     Tobacco Use    Smoking status: Former     Years: 10     Types: Cigarettes     Start date: 9/10/1972     Quit date: 9/23/1991     Years since  "quittin.5    Smokeless tobacco: Never   Substance Use Topics    Alcohol use: Yes     Comment: Occasionally     Family History   Problem Relation Age of Onset    Thyroid Disease Sister     Lung Cancer Sister 67        Lung Cancer, removal of left lower lung    Diabetes Mother     Hypertension Mother     Allergies Mother     Arthritis Mother     Eye Disorder Mother     Gastrointestinal Disease Mother         diverticulitis    Osteoporosis Mother         and spinal stenosis    Asthma Mother     Atrial fibrillation Mother     Diverticulitis Mother     Diabetes Type 2  Mother 70.00    Heart Disease Father     Alcohol/Drug Father     Irritable Bowel Syndrome Father         possibly    Heart Failure Father 71.00    Cancer Sister     Neurologic Disorder Sister         brain tumor / cancer    Bone Cancer Sister     Cancer Sister     Parkinsonism Brother     Breast Cancer No family hx of              Review of Systems  CONSTITUTIONAL: NEGATIVE for fever, chills, change in weight  INTEGUMENTARY/SKIN: NEGATIVE for worrisome rashes, moles or lesions  EYES: NEGATIVE for vision changes or irritation  ENT/MOUTH: NEGATIVE for ear, mouth and throat problems  RESP: NEGATIVE for significant cough or SOB  BREAST: NEGATIVE for masses, tenderness or discharge  CV: NEGATIVE for chest pain, palpitations or peripheral edema  GI: NEGATIVE for nausea, abdominal pain, heartburn, or change in bowel habits  : NEGATIVE for frequency, dysuria, or hematuria  MUSCULOSKELETAL: NEGATIVE for significant arthralgias or myalgia  NEURO: NEGATIVE for weakness, dizziness or paresthesias  ENDOCRINE: NEGATIVE for temperature intolerance, skin/hair changes  HEME: NEGATIVE for bleeding problems  PSYCHIATRIC: NEGATIVE for changes in mood or affect     Objective    Exam  /86   Pulse 75   Resp 17   Ht 1.575 m (5' 2.01\")   Wt 56.7 kg (125 lb)   SpO2 96%   BMI 22.86 kg/m     Estimated body mass index is 22.86 kg/m  as calculated from the " "following:    Height as of this encounter: 1.575 m (5' 2.01\").    Weight as of this encounter: 56.7 kg (125 lb).    Physical Exam  GENERAL: alert and no distress  EYES: Eyes grossly normal to inspection, PERRL and conjunctivae and sclerae normal  HENT: ear canals and TM's normal, nose and mouth without ulcers or lesions  NECK: no adenopathy, no asymmetry, masses, or scars  RESP: lungs clear to auscultation - no rales, rhonchi or wheezes  CV: regular rate and rhythm, normal S1 S2, no S3 or S4, no murmur, click or rub, no peripheral edema  ABDOMEN: soft, nontender, no hepatosplenomegaly, no masses and bowel sounds normal  MS: no gross musculoskeletal defects noted, no edema  SKIN: no suspicious lesions or rashes  NEURO: Normal strength and tone, mentation intact and speech normal  PSYCH: mentation appears normal, affect normal/bright        Signed Electronically by: Paulina Zamora PA-C    "

## 2024-04-08 NOTE — PROGRESS NOTES
{PROVIDER CHARTING PREFERENCE:265427}    Subjective   Davida is a 63 year old, presenting for the following health issues:  No chief complaint on file.  {(!) Visit Details have not yet been documented.  Please enter Visit Details and then use this list to pull in documentation. (Optional):862567}  History of Present Illness       Reason for visit:  Yearly exam    She eats 2-3 servings of fruits and vegetables daily.She consumes 0 sweetened beverage(s) daily.She exercises with enough effort to increase her heart rate 10 to 19 minutes per day.  She exercises with enough effort to increase her heart rate 3 or less days per week.   She is taking medications regularly.       {MA/LPN/RN Pre-Provider Visit Orders- hCG/UA/Strep (Optional):395133}  {SUPERLIST (Optional):496123}  {additonal problems for provider to add (Optional):116991}    {ROS Picklists (Optional):778216}      Objective    There were no vitals taken for this visit.  There is no height or weight on file to calculate BMI.  Physical Exam   {Exam List (Optional):430527}    {Diagnostic Test Results (Optional):392317}        Signed Electronically by: Paulina Zamora PA-C  {Email feedback regarding this note to [email or web address]   :313641}                                                                                                                                                                                                                                                                                                                                                                                                                                                                                                                                                                                                                                                                            Answers submitted by the patient for this  visit:  General Questionnaire (Submitted on 4/1/2024)  Chief Complaint: Chronic problems general questions HPI Form  What is the reason for your visit today? : Yearly exam  How many servings of fruits and vegetables do you eat daily?: 2-3  On average, how many sweetened beverages do you drink each day (Examples: soda, juice, sweet tea, etc.  Do NOT count diet or artificially sweetened beverages)?: 0  How many minutes a day do you exercise enough to make your heart beat faster?: 10 to 19  How many days a week do you exercise enough to make your heart beat faster?: 3 or less  How many days per week do you miss taking your medication?: 0

## 2024-04-16 DIAGNOSIS — I10 ESSENTIAL HYPERTENSION: ICD-10-CM

## 2024-04-18 RX ORDER — METOPROLOL SUCCINATE 50 MG/1
TABLET, EXTENDED RELEASE ORAL
Qty: 90 TABLET | Refills: 1 | Status: SHIPPED | OUTPATIENT
Start: 2024-04-18 | End: 2024-10-04

## 2024-05-10 ENCOUNTER — LAB (OUTPATIENT)
Dept: LAB | Facility: CLINIC | Age: 64
End: 2024-05-10
Payer: COMMERCIAL

## 2024-05-10 ENCOUNTER — HOSPITAL ENCOUNTER (OUTPATIENT)
Dept: MAMMOGRAPHY | Facility: CLINIC | Age: 64
Discharge: HOME | End: 2024-05-10
Attending: PHYSICIAN ASSISTANT | Admitting: PHYSICIAN ASSISTANT
Payer: COMMERCIAL

## 2024-05-10 DIAGNOSIS — E04.1 THYROID NODULE: ICD-10-CM

## 2024-05-10 DIAGNOSIS — R79.9 ELEVATED BUN: ICD-10-CM

## 2024-05-10 DIAGNOSIS — Z12.31 VISIT FOR SCREENING MAMMOGRAM: ICD-10-CM

## 2024-05-10 LAB
ANION GAP SERPL CALCULATED.3IONS-SCNC: 9 MMOL/L (ref 7–15)
BUN SERPL-MCNC: 21.9 MG/DL (ref 8–23)
CALCIUM SERPL-MCNC: 9.9 MG/DL (ref 8.8–10.2)
CHLORIDE SERPL-SCNC: 106 MMOL/L (ref 98–107)
CREAT SERPL-MCNC: 1.11 MG/DL (ref 0.51–0.95)
DEPRECATED HCO3 PLAS-SCNC: 28 MMOL/L (ref 22–29)
EGFRCR SERPLBLD CKD-EPI 2021: 55 ML/MIN/1.73M2
GLUCOSE SERPL-MCNC: 101 MG/DL (ref 70–99)
POTASSIUM SERPL-SCNC: 4.1 MMOL/L (ref 3.4–5.3)
SODIUM SERPL-SCNC: 143 MMOL/L (ref 135–145)
T4 FREE SERPL-MCNC: 0.93 NG/DL (ref 0.9–1.7)
TSH SERPL DL<=0.005 MIU/L-ACNC: 1.88 UIU/ML (ref 0.3–4.2)

## 2024-05-10 PROCEDURE — 80048 BASIC METABOLIC PNL TOTAL CA: CPT

## 2024-05-10 PROCEDURE — 84439 ASSAY OF FREE THYROXINE: CPT

## 2024-05-10 PROCEDURE — 84443 ASSAY THYROID STIM HORMONE: CPT

## 2024-05-10 PROCEDURE — 77067 SCR MAMMO BI INCL CAD: CPT

## 2024-05-10 PROCEDURE — 84480 ASSAY TRIIODOTHYRONINE (T3): CPT

## 2024-05-10 PROCEDURE — 36415 COLL VENOUS BLD VENIPUNCTURE: CPT

## 2024-05-11 ENCOUNTER — APPOINTMENT (OUTPATIENT)
Dept: RADIOLOGY | Facility: HOSPITAL | Age: 64
End: 2024-05-11
Attending: EMERGENCY MEDICINE
Payer: COMMERCIAL

## 2024-05-11 ENCOUNTER — HOSPITAL ENCOUNTER (EMERGENCY)
Facility: HOSPITAL | Age: 64
Discharge: HOME | End: 2024-05-11
Attending: EMERGENCY MEDICINE | Admitting: EMERGENCY MEDICINE
Payer: COMMERCIAL

## 2024-05-11 VITALS
WEIGHT: 125 LBS | BODY MASS INDEX: 23 KG/M2 | OXYGEN SATURATION: 96 % | HEART RATE: 65 BPM | TEMPERATURE: 98.7 F | HEIGHT: 62 IN | DIASTOLIC BLOOD PRESSURE: 83 MMHG | RESPIRATION RATE: 20 BRPM | SYSTOLIC BLOOD PRESSURE: 140 MMHG

## 2024-05-11 DIAGNOSIS — R07.9 CHEST PAIN, UNSPECIFIED TYPE: ICD-10-CM

## 2024-05-11 LAB
ALBUMIN SERPL BCG-MCNC: 3.9 G/DL (ref 3.5–5.2)
ALP SERPL-CCNC: 90 U/L (ref 40–150)
ALT SERPL W P-5'-P-CCNC: 17 U/L (ref 0–50)
ANION GAP SERPL CALCULATED.3IONS-SCNC: 8 MMOL/L (ref 7–15)
AST SERPL W P-5'-P-CCNC: 24 U/L (ref 0–45)
BASOPHILS # BLD AUTO: 0 10E3/UL (ref 0–0.2)
BASOPHILS NFR BLD AUTO: 0 %
BILIRUB DIRECT SERPL-MCNC: <0.2 MG/DL (ref 0–0.3)
BILIRUB SERPL-MCNC: 0.2 MG/DL
BUN SERPL-MCNC: 24.3 MG/DL (ref 8–23)
CALCIUM SERPL-MCNC: 9.6 MG/DL (ref 8.8–10.2)
CHLORIDE SERPL-SCNC: 107 MMOL/L (ref 98–107)
CREAT SERPL-MCNC: 0.97 MG/DL (ref 0.51–0.95)
DEPRECATED HCO3 PLAS-SCNC: 29 MMOL/L (ref 22–29)
EGFRCR SERPLBLD CKD-EPI 2021: 65 ML/MIN/1.73M2
EOSINOPHIL # BLD AUTO: 0.5 10E3/UL (ref 0–0.7)
EOSINOPHIL NFR BLD AUTO: 9 %
ERYTHROCYTE [DISTWIDTH] IN BLOOD BY AUTOMATED COUNT: 12.6 % (ref 10–15)
GLUCOSE SERPL-MCNC: 95 MG/DL (ref 70–99)
HCT VFR BLD AUTO: 41.3 % (ref 35–47)
HGB BLD-MCNC: 13.5 G/DL (ref 11.7–15.7)
HOLD SPECIMEN: NORMAL
IMM GRANULOCYTES # BLD: 0 10E3/UL
IMM GRANULOCYTES NFR BLD: 0 %
LIPASE SERPL-CCNC: 26 U/L (ref 13–60)
LYMPHOCYTES # BLD AUTO: 1.8 10E3/UL (ref 0.8–5.3)
LYMPHOCYTES NFR BLD AUTO: 35 %
MCH RBC QN AUTO: 31 PG (ref 26.5–33)
MCHC RBC AUTO-ENTMCNC: 32.7 G/DL (ref 31.5–36.5)
MCV RBC AUTO: 95 FL (ref 78–100)
MONOCYTES # BLD AUTO: 0.5 10E3/UL (ref 0–1.3)
MONOCYTES NFR BLD AUTO: 9 %
NEUTROPHILS # BLD AUTO: 2.4 10E3/UL (ref 1.6–8.3)
NEUTROPHILS NFR BLD AUTO: 47 %
NRBC # BLD AUTO: 0 10E3/UL
NRBC BLD AUTO-RTO: 0 /100
PLATELET # BLD AUTO: 227 10E3/UL (ref 150–450)
POTASSIUM SERPL-SCNC: 3.8 MMOL/L (ref 3.4–5.3)
PROT SERPL-MCNC: 6.6 G/DL (ref 6.4–8.3)
RBC # BLD AUTO: 4.35 10E6/UL (ref 3.8–5.2)
SODIUM SERPL-SCNC: 144 MMOL/L (ref 135–145)
T3 SERPL-MCNC: 78 NG/DL (ref 85–202)
TROPONIN T SERPL HS-MCNC: <6 NG/L
WBC # BLD AUTO: 5.1 10E3/UL (ref 4–11)

## 2024-05-11 PROCEDURE — 71046 X-RAY EXAM CHEST 2 VIEWS: CPT

## 2024-05-11 PROCEDURE — 83690 ASSAY OF LIPASE: CPT | Performed by: EMERGENCY MEDICINE

## 2024-05-11 PROCEDURE — 85025 COMPLETE CBC W/AUTO DIFF WBC: CPT | Performed by: EMERGENCY MEDICINE

## 2024-05-11 PROCEDURE — 36415 COLL VENOUS BLD VENIPUNCTURE: CPT | Performed by: EMERGENCY MEDICINE

## 2024-05-11 PROCEDURE — 80076 HEPATIC FUNCTION PANEL: CPT | Performed by: EMERGENCY MEDICINE

## 2024-05-11 PROCEDURE — 93005 ELECTROCARDIOGRAM TRACING: CPT | Performed by: EMERGENCY MEDICINE

## 2024-05-11 PROCEDURE — 99285 EMERGENCY DEPT VISIT HI MDM: CPT | Mod: 25

## 2024-05-11 PROCEDURE — 93005 ELECTROCARDIOGRAM TRACING: CPT | Performed by: STUDENT IN AN ORGANIZED HEALTH CARE EDUCATION/TRAINING PROGRAM

## 2024-05-11 PROCEDURE — 84484 ASSAY OF TROPONIN QUANT: CPT | Performed by: EMERGENCY MEDICINE

## 2024-05-11 ASSESSMENT — ACTIVITIES OF DAILY LIVING (ADL)
ADLS_ACUITY_SCORE: 36
ADLS_ACUITY_SCORE: 36

## 2024-05-11 ASSESSMENT — COLUMBIA-SUICIDE SEVERITY RATING SCALE - C-SSRS
2. HAVE YOU ACTUALLY HAD ANY THOUGHTS OF KILLING YOURSELF IN THE PAST MONTH?: NO
6. HAVE YOU EVER DONE ANYTHING, STARTED TO DO ANYTHING, OR PREPARED TO DO ANYTHING TO END YOUR LIFE?: NO
1. IN THE PAST MONTH, HAVE YOU WISHED YOU WERE DEAD OR WISHED YOU COULD GO TO SLEEP AND NOT WAKE UP?: NO

## 2024-05-11 NOTE — ED PROVIDER NOTES
Emergency Department Encounter     Evaluation Date & Time:   No admission date for patient encounter.    CHIEF COMPLAINT:  Chest Pain and Chest Pain      Triage Note:Patient presents here for evaluation of mid sternal chest pain that occurred last night. Currently pain free, but she feels very tired today. Hx of two Mi related to arterial dissection. She reports that angiogram revealed no cardiac damage.                ED COURSE & MEDICAL DECISION MAKING:     Pt has a history of coronary artery dissection on aspirin, here with symptoms she had last night around 2330 while watching TV. Pt reports she developed < 1 minute of central chest pain that radiated up chest into throat and into ears.  Denies associated n/v, diaphoresis, dyspnea, leg pain/swelling, syncope.  Pt states it's entirely resolved, but feeling more fatigued today.  Pt ultimately decided to come in now after worrying about her pain with a previous history of coronary artery dissection. No recent illness, no hx of dvt/pe or recent leg pain/swelling. She's vitally well, reassuring history with non-ischemic EKG. Will get labs, CXR, reassess.      ED Course as of 05/11/24 1605   Sat May 11, 2024   1410 I met with the patient, obtained history, performed an initial exam, and discussed options and plan for diagnostics and treatment here in the ED.     1500 hsTrop < 6, ACS ruled out.  Rest of labs overall reassuring.    1500 CXR (independent interpretation): no acute cardiopulmonary process    1530 I rechecked and updated patient.  Symptoms remain resolved. Very atypical presentation with reassuring exam. Will discharge, outpatient primary and cardiology follow up advised.  Pt instructed on OTC meds, return precautions, agreeable to plan.         Medical Decision Making    History:  Supplemental history from: Documented in chart  External Record(s) reviewed: Documented in chart    Work Up:  Chart documentation includes differential considered and any EKGs  "or imaging independently interpreted by provider, where specified.  In additional to work up documented, I considered the following work up: Documented in chart, if applicable.    External consultation:  Discussion of management with another provider: Documented in chart, if applicable    Complicating factors:  Care impacted by chronic illness: Hypertension and Other: Prinzmetal angina  Care affected by social determinants of health: N/A    Disposition considerations: Discharge. No recommendations on prescription strength medication(s). I considered admission, but ultimately discharged patient after reassuring workup, outpatient follow up and treatment plan.      At the conclusion of the encounter I discussed the results of all the tests and the disposition. The questions were answered. The patient or family acknowledged understanding and was agreeable with the care plan.      MEDICATIONS GIVEN IN THE EMERGENCY DEPARTMENT:  Medications - No data to display    NEW PRESCRIPTIONS STARTED AT TODAY'S ED VISIT:  New Prescriptions    No medications on file       HPI       Davida Mo is a 63 year old female with a pertinent history of prinzmetal angina, NSTEMI, hypertension, who presents to this ED by walk in for evaluation of chest pain.     The patient reports having chest pain last night at 11:30 PM, while laying down and watching television. She described the pain as in the center of her chest, and it radiated up into her neck and into her ears. She described the pain as \"deep and painful.\" This occurred two times over the span of one minute. No chest pain episodes since last night, and she is more concerned and wanted to be checked out today. Today she feels more tired, drained and having more indigestion as of late. Patient takes aspirin.     Patient denies nausea, vomiting, shortness of breath or sweating. No recent sicknesses.     REVIEW OF SYSTEMS:  See HPI      Medical History     Past Medical History: "   Diagnosis Date    Arthritis     Depression     Hypertension     IBS (irritable bowel syndrome)     Motion sickness     Need for prophylactic hormone replacement therapy (postmenopausal)     NSTEMI (non-ST elevated myocardial infarction) (H) ,2014    Prinzmetal angina (H24)     Thyroid disease        Past Surgical History:   Procedure Laterality Date    BUNIONECTOMY RT/LT      DILATION AND CURETTAGE      DILATION AND CURETTAGE, OPERATIVE HYSTEROSCOPY, COMBINED N/A 2021    Procedure: HYSTEROSCOPY, POLYPECTOMY;  Surgeon: Sebastian Mcguire MD;  Location: [facility]    TUBAL LIGATION      TUBAL LIGATION         Family History   Problem Relation Age of Onset    Thyroid Disease Sister     Lung Cancer Sister 67        Lung Cancer, removal of left lower lung    Diabetes Mother     Hypertension Mother     Allergies Mother     Arthritis Mother     Eye Disorder Mother     Gastrointestinal Disease Mother         diverticulitis    Osteoporosis Mother         and spinal stenosis    Asthma Mother     Atrial fibrillation Mother     Diverticulitis Mother     Diabetes Type 2  Mother 70.00    Heart Disease Father     Alcohol/Drug Father     Irritable Bowel Syndrome Father         possibly    Heart Failure Father 71.00    Cancer Sister     Neurologic Disorder Sister         brain tumor / cancer    Bone Cancer Sister     Cancer Sister     Parkinsonism Brother     Breast Cancer No family hx of        Social History     Tobacco Use    Smoking status: Former     Current packs/day: 0.00     Types: Cigarettes     Start date: 9/10/1972     Quit date: 1991     Years since quittin.6    Smokeless tobacco: Never   Vaping Use    Vaping status: Never Used   Substance Use Topics    Alcohol use: Yes     Comment: Occasionally    Drug use: No       aspirin 81 MG EC tablet  CALCIUM PO  clobetasol (TEMOVATE) 0.05 % external ointment  diltiazem ER (DILT-XR) 180 MG 24 hr capsule  estradiol (CLIMARA) 0.05 MG/24HR weekly  "patch  L-Tryptophan 500 MG TABS  LORazepam (ATIVAN) 0.5 MG tablet  metoprolol succinate ER (TOPROL XL) 50 MG 24 hr tablet  OVER-THE-COUNTER  progesterone (PROMETRIUM) 100 MG capsule  STATIN NOT PRESCRIBED (INTENTIONAL)  thyroid (NP THYROID) 30 MG tablet  Ubiquinol 100 MG CAPS  Vitamin D-Vitamin K (VITAMIN K2-VITAMIN D3 PO)        Physical Exam     Vitals:  BP (!) 140/83   Pulse 65   Temp 98.7  F (37.1  C) (Oral)   Resp 20   Ht 1.575 m (5' 2\")   Wt 56.7 kg (125 lb)   SpO2 96%   BMI 22.86 kg/m      PHYSICAL EXAM:   Physical Exam  Vitals and nursing note reviewed.   Constitutional:       General: She is not in acute distress.     Appearance: Normal appearance.   HENT:      Head: Normocephalic and atraumatic.      Nose: Nose normal.      Mouth/Throat:      Mouth: Mucous membranes are moist.   Eyes:      Pupils: Pupils are equal, round, and reactive to light.   Neck:      Vascular: No JVD.   Cardiovascular:      Rate and Rhythm: Normal rate and regular rhythm.      Pulses: Normal pulses.           Radial pulses are 2+ on the right side and 2+ on the left side.        Dorsalis pedis pulses are 2+ on the right side and 2+ on the left side.   Pulmonary:      Effort: Pulmonary effort is normal. No respiratory distress.      Breath sounds: Normal breath sounds.   Chest:      Comments: No rash on center chest wall  Abdominal:      Palpations: Abdomen is soft.      Tenderness: There is no abdominal tenderness.   Musculoskeletal:      Cervical back: Full passive range of motion without pain and neck supple.      Comments: No calf tenderness or swelling b/l   Skin:     General: Skin is warm.      Findings: No rash.   Neurological:      General: No focal deficit present.      Mental Status: She is alert. Mental status is at baseline.      Comments: Fluent speech, no acute lateralizing deficits   Psychiatric:         Mood and Affect: Mood normal.         Behavior: Behavior normal.           Results     LAB:  All pertinent " labs reviewed and interpreted  Labs Ordered and Resulted from Time of ED Arrival to Time of ED Departure   BASIC METABOLIC PANEL - Abnormal       Result Value    Sodium 144      Potassium 3.8      Chloride 107      Carbon Dioxide (CO2) 29      Anion Gap 8      Urea Nitrogen 24.3 (*)     Creatinine 0.97 (*)     GFR Estimate 65      Calcium 9.6      Glucose 95     TROPONIN T, HIGH SENSITIVITY - Normal    Troponin T, High Sensitivity <6     HEPATIC FUNCTION PANEL - Normal    Protein Total 6.6      Albumin 3.9      Bilirubin Total 0.2      Alkaline Phosphatase 90      AST 24      ALT 17      Bilirubin Direct <0.20     LIPASE - Normal    Lipase 26     CBC WITH PLATELETS AND DIFFERENTIAL    WBC Count 5.1      RBC Count 4.35      Hemoglobin 13.5      Hematocrit 41.3      MCV 95      MCH 31.0      MCHC 32.7      RDW 12.6      Platelet Count 227      % Neutrophils 47      % Lymphocytes 35      % Monocytes 9      % Eosinophils 9      % Basophils 0      % Immature Granulocytes 0      NRBCs per 100 WBC 0      Absolute Neutrophils 2.4      Absolute Lymphocytes 1.8      Absolute Monocytes 0.5      Absolute Eosinophils 0.5      Absolute Basophils 0.0      Absolute Immature Granulocytes 0.0      Absolute NRBCs 0.0         RADIOLOGY:  XR Chest 2 Views   Final Result   IMPRESSION: Negative chest.                         ECG:  NSR, rate 71, normal intervals, no acute ischemia    I have independently reviewed and interpreted the EKG(s) documented above     PROCEDURES:  Procedures:  none      FINAL IMPRESSION:    ICD-10-CM    1. Chest pain, unspecified type  R07.9           0 minutes of critical care time      I, Elizabeth Garza, am serving as a scribe to document services personally performed by Dr. Torsten Roblero, based on my observations and the provider's statements to me. I, Torsten Roblero, DO attest that Elizabeth Garza is acting in a scribe capacity, has observed my performance of the services and has documented them in  accordance with my direction.      Torsten Roblero DO  Emergency Medicine  [facility] EMERGENCY DEPARTMENT  5/11/2024  2:06 PM          Torsten Roblero MD  05/11/24 [id number]

## 2024-05-11 NOTE — ED TRIAGE NOTES
Patient presents here for evaluation of mid sternal chest pain that occurred last night. Currently pain free, but she feels very tired today. Hx of two Mi related to arterial dissection. She reports that angiogram revealed no cardiac damage.      Triage Assessment (Adult)       Row Name 05/11/24 7035          Triage Assessment    Airway WDL WDL        Respiratory WDL    Respiratory WDL WDL        Skin Circulation/Temperature WDL    Skin Circulation/Temperature WDL WDL        Cardiac WDL    Cardiac WDL WDL        Peripheral/Neurovascular WDL    Peripheral Neurovascular WDL WDL        Cognitive/Neuro/Behavioral WDL    Cognitive/Neuro/Behavioral WDL WDL

## 2024-05-11 NOTE — DISCHARGE INSTRUCTIONS
Follow up with your primary clinic and cardiologist. Try taking pepcid over the counter twice a day. Return for worsening symptoms as we discussed.

## 2024-05-13 DIAGNOSIS — R79.9 ELEVATED BUN: Primary | ICD-10-CM

## 2024-05-26 LAB
ATRIAL RATE - MUSE: 71 BPM
DIASTOLIC BLOOD PRESSURE - MUSE: NORMAL MMHG
INTERPRETATION ECG - MUSE: NORMAL
P AXIS - MUSE: 61 DEGREES
PR INTERVAL - MUSE: 170 MS
QRS DURATION - MUSE: 94 MS
QT - MUSE: 404 MS
QTC - MUSE: 439 MS
R AXIS - MUSE: 39 DEGREES
SYSTOLIC BLOOD PRESSURE - MUSE: NORMAL MMHG
T AXIS - MUSE: 40 DEGREES
VENTRICULAR RATE- MUSE: 71 BPM

## 2024-05-28 ENCOUNTER — HOSPITAL ENCOUNTER (EMERGENCY)
Facility: CLINIC | Age: 64
Discharge: HOME | End: 2024-05-28
Attending: EMERGENCY MEDICINE | Admitting: EMERGENCY MEDICINE
Payer: COMMERCIAL

## 2024-05-28 ENCOUNTER — APPOINTMENT (OUTPATIENT)
Dept: ULTRASOUND IMAGING | Facility: CLINIC | Age: 64
End: 2024-05-28
Attending: EMERGENCY MEDICINE
Payer: COMMERCIAL

## 2024-05-28 VITALS
BODY MASS INDEX: 22.63 KG/M2 | SYSTOLIC BLOOD PRESSURE: 124 MMHG | DIASTOLIC BLOOD PRESSURE: 91 MMHG | HEIGHT: 62 IN | OXYGEN SATURATION: 97 % | TEMPERATURE: 97.8 F | WEIGHT: 123 LBS | RESPIRATION RATE: 18 BRPM | HEART RATE: 61 BPM

## 2024-05-28 DIAGNOSIS — M79.662 PAIN OF LEFT CALF: ICD-10-CM

## 2024-05-28 PROCEDURE — 99284 EMERGENCY DEPT VISIT MOD MDM: CPT | Mod: 25 | Performed by: EMERGENCY MEDICINE

## 2024-05-28 PROCEDURE — 93971 EXTREMITY STUDY: CPT | Mod: LT

## 2024-05-28 PROCEDURE — 99283 EMERGENCY DEPT VISIT LOW MDM: CPT | Performed by: EMERGENCY MEDICINE

## 2024-05-28 ASSESSMENT — ACTIVITIES OF DAILY LIVING (ADL)
ADLS_ACUITY_SCORE: 36

## 2024-05-28 ASSESSMENT — ENCOUNTER SYMPTOMS
RESPIRATORY NEGATIVE: 1
CARDIOVASCULAR NEGATIVE: 1
ALLERGIC/IMMUNOLOGIC NEGATIVE: 1
CONSTITUTIONAL NEGATIVE: 1
PSYCHIATRIC NEGATIVE: 1
GASTROINTESTINAL NEGATIVE: 1
ENDOCRINE NEGATIVE: 1
EYES NEGATIVE: 1
HEMATOLOGIC/LYMPHATIC NEGATIVE: 1
NEUROLOGICAL NEGATIVE: 1

## 2024-05-28 ASSESSMENT — COLUMBIA-SUICIDE SEVERITY RATING SCALE - C-SSRS
1. IN THE PAST MONTH, HAVE YOU WISHED YOU WERE DEAD OR WISHED YOU COULD GO TO SLEEP AND NOT WAKE UP?: NO
2. HAVE YOU ACTUALLY HAD ANY THOUGHTS OF KILLING YOURSELF IN THE PAST MONTH?: NO
6. HAVE YOU EVER DONE ANYTHING, STARTED TO DO ANYTHING, OR PREPARED TO DO ANYTHING TO END YOUR LIFE?: NO

## 2024-05-28 NOTE — DISCHARGE INSTRUCTIONS
1) Your evaluation today did not reveal the cause of your acute left calf pain over the last 2 weeks.  exam and ultrasound did not suggest an emergency diagnosis or condition.  We discussed additional options for workup and care if his symptoms persist in the next 1 to 2 weeks.    2) We have the possibility of calf strain and soft tissue care including heat therapy, Tylenol 1000 mg every 8 hours as needed keep your leg elevated and compression with Ace wrap or compression socks    3) You appear stable for discharge to home with plan for follow-up with your clinic provider if your symptoms persist in the next 1 to 2 weeks was reviewed.  We discussed that if you develop new symptoms of concern including but not limited to leg weakness or numbness you  should return to be evaluated.

## 2024-05-28 NOTE — ED PROVIDER NOTES
History     Chief Complaint   Patient presents with    Leg Pain     Left leg pain and swelling     HPI  Davida Mo is a 63 year old female who presents with concern about left calf pain  after recent activity with gardening.    Reviewed the medical record.  Visit on 5/11/24-patient has a diagnosis of anxiety, chest pain, hypertension, prior diagnosis of dissection coronary artery interval bowel syndrome with diarrhea.   Patient's current prescribed medications were reviewed at bedside.    On examination patient reports she been guarding for the last 2 weeks but not more than usual.  She did have an episode where she bent her legs and reports that she has had left calf pain since  event.  No prior history of knee or hip problems and no prior history of back surgery or interventions.  She had an episode of chest discomfort and confirmed her visit on 5/11/24. there were no bites during gardening.  No history of DVT or PE.  She does not smoke.  Since her assessment for chest discomfort given her history of ACS and known coronary disease she has had no chest pain or dyspnea on exertion.  She also reports she has had no warmth or redness to the leg thigh or calf.  She presented for further care      Allergies:  Allergies   Allergen Reactions    Kiwi Anaphylaxis    Milk Protein Other (See Comments)     Contraindicated due to Thyroid, Hashimoto    Egg White (Diagnostic) Other (See Comments)     tested    Lactose Other (See Comments)     Contraindicated due to Thyroid, Hashimoto    Magnesium GI Disturbance    Other (Do Not Use) Anxiety, Other (See Comments), Diarrhea, Cramps and GI Disturbance     Hydroxizine name of medication    Remeron [Mirtazapine] Rash     Patient states that medication made her extremly tired, felt like she could not function until late afternoon.  Also noted having rash all over body       Problem List:    Patient Active Problem List    Diagnosis Date Noted    Irritable bowel syndrome with  diarrhea 10/11/2021     Priority: Medium    Dissection of coronary artery 05/10/2018     Priority: Medium    Prinzmetal angina (H24) 06/30/2016     Priority: Medium    Thyroid nodule 08/24/2015     Priority: Medium    Essential hypertension 09/24/2014     Priority: Medium    Chest pain 09/23/2014     Priority: Medium    Anxiety 12/11/2012     Priority: Medium    Lentigo 12/11/2012     Priority: Medium    Seborrheic keratosis 12/11/2012     Priority: Medium        Past Medical History:    Past Medical History:   Diagnosis Date    Arthritis     Depression     Hypertension     IBS (irritable bowel syndrome)     Motion sickness     Need for prophylactic hormone replacement therapy (postmenopausal)     NSTEMI (non-ST elevated myocardial infarction) (H) 2011,9/23/2014    Prinzmetal angina (H24)     Thyroid disease        Past Surgical History:    Past Surgical History:   Procedure Laterality Date    BUNIONECTOMY RT/LT      DILATION AND CURETTAGE      DILATION AND CURETTAGE, OPERATIVE HYSTEROSCOPY, COMBINED N/A 12/20/2021    Procedure: HYSTEROSCOPY, POLYPECTOMY;  Surgeon: Sebastian Mcguire MD;  Location: [facility]    TUBAL LIGATION      TUBAL LIGATION         Family History:    Family History   Problem Relation Age of Onset    Thyroid Disease Sister     Lung Cancer Sister 67        Lung Cancer, removal of left lower lung    Diabetes Mother     Hypertension Mother     Allergies Mother     Arthritis Mother     Eye Disorder Mother     Gastrointestinal Disease Mother         diverticulitis    Osteoporosis Mother         and spinal stenosis    Asthma Mother     Atrial fibrillation Mother     Diverticulitis Mother     Diabetes Type 2  Mother 70.00    Heart Disease Father     Alcohol/Drug Father     Irritable Bowel Syndrome Father         possibly    Heart Failure Father 71.00    Cancer Sister     Neurologic Disorder Sister         brain tumor / cancer    Bone Cancer Sister     Cancer Sister     Parkinsonism Brother   "   Breast Cancer No family hx of        Social History:  Marital Status:   [2]  Social History     Tobacco Use    Smoking status: Former     Current packs/day: 0.00     Types: Cigarettes     Start date: 9/10/1972     Quit date: 1991     Years since quittin.7    Smokeless tobacco: Never   Vaping Use    Vaping status: Never Used   Substance Use Topics    Alcohol use: Yes     Comment: Occasionally    Drug use: No        Medications:    aspirin 81 MG EC tablet  CALCIUM PO  clobetasol (TEMOVATE) 0.05 % external ointment  diltiazem ER (DILT-XR) 180 MG 24 hr capsule  estradiol (CLIMARA) 0.05 MG/24HR weekly patch  L-Tryptophan 500 MG TABS  LORazepam (ATIVAN) 0.5 MG tablet  metoprolol succinate ER (TOPROL XL) 50 MG 24 hr tablet  OVER-THE-COUNTER  progesterone (PROMETRIUM) 100 MG capsule  STATIN NOT PRESCRIBED (INTENTIONAL)  thyroid (NP THYROID) 30 MG tablet  Ubiquinol 100 MG CAPS  Vitamin D-Vitamin K (VITAMIN K2-VITAMIN D3 PO)          Review of Systems   Constitutional: Negative.    HENT: Negative.     Eyes: Negative.    Respiratory: Negative.     Cardiovascular: Negative.    Gastrointestinal: Negative.    Endocrine: Negative.    Genitourinary: Negative.    Musculoskeletal:         Left calf pain and leg swelling   Skin: Negative.    Allergic/Immunologic: Negative.    Neurological: Negative.    Hematological: Negative.    Psychiatric/Behavioral: Negative.     All other systems reviewed and are negative.      Physical Exam   BP: (!) (P) 149/94  Pulse: (P) 61  Temp: (P) 97.8  F (36.6  C)  Resp: (P) 16  Height: 157.5 cm (5' 2\")  Weight: 55.8 kg (123 lb)  SpO2: (P) 98 %      Physical Exam  Vitals reviewed.   Constitutional:       General: She is not in acute distress.     Appearance: Normal appearance. She is not ill-appearing, toxic-appearing or diaphoretic.   HENT:      Head: Normocephalic and atraumatic.      Nose: Nose normal.   Eyes:      Extraocular Movements: Extraocular movements intact.      Pupils: " "Pupils are equal, round, and reactive to light.   Cardiovascular:      Rate and Rhythm: Normal rate and regular rhythm.   Pulmonary:      Effort: Pulmonary effort is normal. No respiratory distress.      Breath sounds: Normal breath sounds. No wheezing, rhonchi or rales.   Chest:      Chest wall: No tenderness.   Musculoskeletal:         General: Tenderness present.      Cervical back: Normal range of motion and neck supple.        Legs:    Skin:     Capillary Refill: Capillary refill takes less than 2 seconds.      Coloration: Skin is not jaundiced or pale.      Findings: No bruising, erythema, lesion or rash.   Neurological:      General: No focal deficit present.      Mental Status: She is alert and oriented to person, place, and time.      Cranial Nerves: No cranial nerve deficit.      Sensory: No sensory deficit.      Motor: No weakness.      Coordination: Coordination normal.      Gait: Gait normal.      Deep Tendon Reflexes: Reflexes normal.   Psychiatric:         Mood and Affect: Mood normal.         Behavior: Behavior normal.         Thought Content: Thought content normal.         Judgment: Judgment normal.         ED Course        Procedures              Critical Care time:  none               ED medications: none    ED Vitals:  Vitals:    05/28/24 1248 05/28/24 1301 05/28/24 1344 05/28/24 1416   BP: (!) (P) 149/94 (!) 152/88 131/81 (!) 124/91   Pulse: (P) 61 66 61 61   Resp: (P) 16 18     Temp: (P) 97.8  F (36.6  C) 97.8  F (36.6  C)     TempSrc: (P) Oral Oral     SpO2: (P) 98% 100% 96% 97%   Weight:  55.8 kg (123 lb)     Height:  1.575 m (5' 2\")       ED labs and imaging:  Results for orders placed or performed during the hospital encounter of 05/28/24   US Lower Extremity Venous Duplex Left     Status: None    Narrative    US LOWER EXTREMITY VENOUS DUPLEX LEFT 5/28/2024 3:08 PM    CLINICAL HISTORY: 2-week history of left calf pain and leg swelling.   Evaluate for DVT vs other acute soft tissue " process  TECHNIQUE: Venous Duplex ultrasound of the left lower extremity with  and without compression, augmentation and duplex. Color flow and  spectral Doppler with waveform analysis performed.    COMPARISON: None.    FINDINGS: Exam includes the common femoral, femoral, popliteal, and  contralateral common femoral veins as well as segmentally visualized  deep calf veins and greater saphenous vein.     LEFT: No deep vein thrombosis. No superficial thrombophlebitis. No  popliteal cyst.      Impression    IMPRESSION:  1.  No deep venous thrombosis in the left lower extremity.    EDNA MATHEW MD         SYSTEM ID:  [id number]     Assessments & Plan (with Medical Decision Making)   Assessment Summary and clinical Impression: 63-year-old female who presented with 2-week history of left calf pain and leg swelling and pain after gardening and concern about a DVT.  On arrival she was afebrile.  She was 100% on room air.  Blood pressure was 152/88.   She was evaluated for possible chest discomfort on 5/11/2024 reported she has had no chest discomfort or shortness of breath or dyspnea on exertion since her assessment.  On exam there was replaceable calf tenderness but no asymmetric calf swelling.  There is no warmth or redness bite wounds or lesions in the leg.  She reported no thigh pain and no dyspnea or chest pain.  Given her concern about DVT comprehensive ultrasound left lower leg was revealed no acute findings.  Also reviewed the possibility of calf strain and soft tissue care measures that will be helpful at home specifically no DVT.  She was discharged with plan for close follow-up with her clinic provider if symptoms persist next 1 to 2 weeks with low threshold to return for new concerns.    ED course and plan:  Reviewed medical record review recent visit on 5/11/24.  Discussed and reviewed possible causes for her symptom. given absence of red flags on exam specifically no warmth or redness no bite wounds or  rash lesions and no report of tingling or numbness with normal calf diameter bilaterally, and normal pulses in the foot leg and thigh ultrasound of the left leg was obtained to exclude DVT versus other acute process.  Comprehensive ultrasound of the left lower leg revealed no finding of DVT no other acute soft tissue process.  See details in the radiology report.  Patient was discharged symptoms of uncertain cause.reviewed suspicion she could have Strained  Follow-up with her clinic provider if symptoms persist in the next 1 to 2 weeks with low threshold to return if there are new concerns.  She expressed comfort, understanding and agreement with plan of care.      Disclaimer: This note consists of symbols derived from keyboarding, dictation and/or voice recognition software. As a result, there may be errors in the script that have gone undetected. Please consider this when interpreting information found in this chart.   I have reviewed the nursing notes.    I have reviewed the findings, diagnosis, plan and need for follow up with the patient.           Medical Decision Making  The patient's presentation was of high complexity (subacute leg pain and swelling).    The patient's evaluation involved:  ordering and/or review of 2 test(s) in this encounter (diagnostic imaging and labs)    The patient's management discussion about disposition planning outpatient follow-up if needed, medication therapy        New Prescriptions    No medications on file       Final diagnoses:   Pain of left calf - Over the last 2 weeks       5/28/2024   [facility] EMERGENCY DEPT       Dennys Bolden MD  05/28/24 5041

## 2024-05-28 NOTE — ED TRIAGE NOTES
Patient states left calf pain that travels up to knee and around to back of knee. Denies any injury but admits to a lot of gardening lately     Triage Assessment (Adult)       Row Name 05/28/24 1306          Triage Assessment    Airway WDL WDL        Respiratory WDL    Respiratory WDL WDL        Skin Circulation/Temperature WDL    Skin Circulation/Temperature WDL WDL        Cardiac WDL    Cardiac WDL WDL        Peripheral/Neurovascular WDL    Peripheral Neurovascular WDL WDL        Cognitive/Neuro/Behavioral WDL    Cognitive/Neuro/Behavioral WDL WDL

## 2024-06-30 ENCOUNTER — MYC REFILL (OUTPATIENT)
Dept: FAMILY MEDICINE | Facility: CLINIC | Age: 64
End: 2024-06-30
Payer: COMMERCIAL

## 2024-06-30 DIAGNOSIS — F41.9 ANXIETY: ICD-10-CM

## 2024-06-30 DIAGNOSIS — F43.10 PTSD (POST-TRAUMATIC STRESS DISORDER): ICD-10-CM

## 2024-07-01 RX ORDER — LORAZEPAM 0.5 MG/1
TABLET ORAL
Qty: 20 TABLET | Refills: 0 | Status: SHIPPED | OUTPATIENT
Start: 2024-07-01

## 2024-08-26 ENCOUNTER — OFFICE VISIT (OUTPATIENT)
Dept: CARDIOLOGY | Facility: CLINIC | Age: 64
End: 2024-08-26
Payer: COMMERCIAL

## 2024-08-26 VITALS
SYSTOLIC BLOOD PRESSURE: 134 MMHG | DIASTOLIC BLOOD PRESSURE: 82 MMHG | RESPIRATION RATE: 18 BRPM | HEIGHT: 62 IN | HEART RATE: 64 BPM | WEIGHT: 122 LBS | OXYGEN SATURATION: 97 % | BODY MASS INDEX: 22.45 KG/M2

## 2024-08-26 DIAGNOSIS — I20.1 PRINZMETAL ANGINA (H): ICD-10-CM

## 2024-08-26 DIAGNOSIS — I65.23 OBSTRUCTION OF CAROTID ARTERY ON BOTH SIDES: Primary | ICD-10-CM

## 2024-08-26 DIAGNOSIS — I10 ESSENTIAL HYPERTENSION: ICD-10-CM

## 2024-08-26 DIAGNOSIS — I25.42 DISSECTION OF CORONARY ARTERY: ICD-10-CM

## 2024-08-26 LAB
CHOLEST SERPL-MCNC: 201 MG/DL
FASTING STATUS PATIENT QL REPORTED: ABNORMAL
HDLC SERPL-MCNC: 61 MG/DL
LDLC SERPL CALC-MCNC: 123 MG/DL
NONHDLC SERPL-MCNC: 140 MG/DL
TRIGL SERPL-MCNC: 85 MG/DL

## 2024-08-26 PROCEDURE — 80061 LIPID PANEL: CPT | Performed by: INTERNAL MEDICINE

## 2024-08-26 PROCEDURE — 36415 COLL VENOUS BLD VENIPUNCTURE: CPT | Performed by: INTERNAL MEDICINE

## 2024-08-26 PROCEDURE — 99214 OFFICE O/P EST MOD 30 MIN: CPT | Performed by: INTERNAL MEDICINE

## 2024-08-26 NOTE — LETTER
8/26/2024    Paulina Zamora PA-C  [id number] [facility] [address]    RE: Davida Mo       Dear Colleague,     I had the pleasure of seeing Davida Mo in the [facility].      [facility]  [phone]          Assessment/Recommendations   Patient with known history of probable coronary artery dissection, mild carotid plaque, and in general has had low cholesterol so is not been placed on statins.  Most recent lipid panel showed LDL cholesterol above 100 and I would like her LDL cholesterol closer to 70.  She feels like she was very stressed at the time of that draw and would like to repeat it so we will get a fasting lipid panel today as she came in fasting.    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    Blood pressure is at goal and she takes an antiplatelet agent.    Will repeat a carotid ultrasound as she did have some mild plaque in the past.       History of Present Illness/Subjective    Ms. Davida Mo is a 63 year old female with known history of myocardial infarction with suspicion of coronary artery dissection.  She has been feeling well this past year.  She had 1 episode where she had some discomfort in her upper chest that went up towards her ears and had an evaluation emergency department and was unremarkable.  She was having stress and more GI things at that time which she feels was the etiology.  She has been working in the garden vigorously and not having any chest discomfort, unusual shortness of breath.  She denies orthopnea, paroxysmal nocturnal dyspnea, peripheral edema, syncope or near syncopal episodes.  No palpitations.    She does have a history of some plaquing in her carotid arteries has not had a carotid ultrasound since 2015.    ECG: Personally reviewed.        Physical Examination Review of Systems   /82 (BP Location:  "Left arm, Patient Position: Sitting, Cuff Size: Adult Regular)   Pulse 64   Resp 18   Ht 1.575 m (5' 2\")   Wt 55.3 kg (122 lb)   SpO2 97%   BMI 22.31 kg/m    Body mass index is 22.31 kg/m .  Wt Readings from Last 3 Encounters:   08/26/24 55.3 kg (122 lb)   05/28/24 55.8 kg (123 lb)   05/11/24 56.7 kg (125 lb)     General Appearance:   Alert, cooperative and in no acute distress.   ENT/Mouth: Pink/moist oral mucosa   EYES:  no scleral icterus, normal conjunctivae   Neck: JVP normal. No Hepatojugular reflux. Thyroid not visualized.   Chest/Lungs:   Lungs are clear to auscultation, equal chest wall expansion.   Cardiovascular:   S1, S2 without murmur ,clicks or rubs. Brachial, radial and posterior tibial pulses are intact and symetric. No carotid bruits noted   Abdomen:  Nontender.    Extremities: No cyanosis, clubbing or edema   Skin: no xanthelasma, warm.    Neurologic: normal arm movement bilateral, no tremors     Psychiatric: Appropriate affect.      Enc Vitals  BP: 134/82  Pulse: 64  Resp: 18  SpO2: 97 %  Weight: 55.3 kg (122 lb)  Height: 157.5 cm (5' 2\")                                           Medical History  Surgical History Family History Social History   Past Medical History:   Diagnosis Date     Arthritis      Depression     on meds from 96 to 06. no symptoms have returned     Hypertension      IBS (irritable bowel syndrome)      Motion sickness      Need for prophylactic hormone replacement therapy (postmenopausal)      NSTEMI (non-ST elevated myocardial infarction) (H) 2011,9/23/2014    see care everywhere     Prinzmetal angina (H24)     first heart attack. prinzmetal angina diagnosis     Thyroid disease     Past Surgical History:   Procedure Laterality Date     BUNIONECTOMY RT/LT       DILATION AND CURETTAGE       DILATION AND CURETTAGE, OPERATIVE HYSTEROSCOPY, COMBINED N/A 12/20/2021    Procedure: HYSTEROSCOPY, POLYPECTOMY;  Surgeon: Sebastian Mcguire MD;  Location: [facility]     [facility] LIGATION       TUBAL LIGATION      Family History   Problem Relation Age of Onset     Thyroid Disease Sister      Lung Cancer Sister 67        Lung Cancer, removal of left lower lung     Diabetes Mother      Hypertension Mother      Allergies Mother      Arthritis Mother      Eye Disorder Mother      Gastrointestinal Disease Mother         diverticulitis     Osteoporosis Mother         and spinal stenosis     Asthma Mother      Atrial fibrillation Mother      Diverticulitis Mother      Diabetes Type 2  Mother 70.00     Heart Disease Father      Alcohol/Drug Father      Irritable Bowel Syndrome Father         possibly     Heart Failure Father 71.00     Cancer Sister      Neurologic Disorder Sister         brain tumor / cancer     Bone Cancer Sister      Cancer Sister      Parkinsonism Brother      Breast Cancer No family hx of     Social History     Socioeconomic History     Marital status:      Spouse name: Not on file     Number of children: Not on file     Years of education: Not on file     Highest education level: Not on file   Occupational History     Not on file   Tobacco Use     Smoking status: Former     Current packs/day: 0.00     Types: Cigarettes     Start date: 9/10/1972     Quit date: 1991     Years since quittin.9     Smokeless tobacco: Never   Vaping Use     Vaping status: Never Used   Substance and Sexual Activity     Alcohol use: Yes     Comment: Occasionally     Drug use: No     Sexual activity: Yes     Partners: Male     Birth control/protection: None   Other Topics Concern     Parent/sibling w/ CABG, MI or angioplasty before 65F 55M? Not Asked   Social History Narrative     Not on file     Social Determinants of Health     Financial Resource Strain: Low Risk  (2024)    Financial Resource Strain      Within the past 12 months, have you or your family members you live with been unable to get utilities (heat, electricity) when it was really needed?: No   Food Insecurity: Low  Risk  (4/8/2024)    Food Insecurity      Within the past 12 months, did you worry that your food would run out before you got money to buy more?: No      Within the past 12 months, did the food you bought just not last and you didn t have money to get more?: No   Transportation Needs: Low Risk  (4/8/2024)    Transportation Needs      Within the past 12 months, has lack of transportation kept you from medical appointments, getting your medicines, non-medical meetings or appointments, work, or from getting things that you need?: No   Physical Activity: Unknown (4/8/2024)    Exercise Vital Sign      Days of Exercise per Week: 1 day      Minutes of Exercise per Session: Not on file   Stress: Stress Concern Present (4/8/2024)    [employer] [place] [employer] - Occupational Stress Questionnaire      Feeling of Stress : To some extent   Social Connections: Unknown (4/8/2024)    Social Connection and Isolation Panel [NHANES]      Frequency of Communication with Friends and Family: Not on file      Frequency of Social Gatherings with Friends and Family: Once a week      Attends Worship Services: Not on file      Active Member of Clubs or Organizations: Not on file      Attends Club or Organization Meetings: Not on file      Marital Status: Not on file   Interpersonal Safety: Not on file   Housing Stability: Low Risk  (4/8/2024)    Housing Stability      Do you have housing? : Yes      Are you worried about losing your housing?: No          Medications  Allergies   Current Outpatient Medications   Medication Sig Dispense Refill     aspirin 81 MG EC tablet Take 81 mg by mouth daily 90 tablet 3     CALCIUM PO Take 2 tablets by mouth daily       diltiazem ER (DILT-XR) 180 MG 24 hr capsule TAKE 1 CAPSULE BY MOUTH EVERY DAY 90 capsule 3     estradiol (CLIMARA) 0.05 MG/24HR weekly patch APPLY 1 PATCH TO SKIN TRANSDERMALLY WEEKLY       L-Tryptophan 500 MG TABS Take 500 mg by mouth 2 times daily       LORazepam (ATIVAN)  0.5 MG tablet Take 1 at onset of panic attack. May repeat in 8 hours if needed. Max 20 tabs per month 20 tablet 0     metoprolol succinate ER (TOPROL XL) 50 MG 24 hr tablet TAKE 1 TABLET BY MOUTH EVERY DAY 90 tablet 1     OVER-THE-COUNTER Take 200 mg by mouth 3 times daily L-theanine       progesterone (PROMETRIUM) 100 MG capsule TAKE 1 CAPSULE ORALLY ONCE A DAY       thyroid (NP THYROID) 30 MG tablet Take 30 mg by mouth daily       Ubiquinol 100 MG CAPS Take 1 capsule by mouth daily       STATIN NOT PRESCRIBED (INTENTIONAL) Please choose reason not prescribed from choices below. (Patient not taking: Reported on 4/19/2023)      Allergies   Allergen Reactions     Kiwi Anaphylaxis     Milk Protein Other (See Comments)     Contraindicated due to Thyroid, Hashimoto     Egg White (Diagnostic) Other (See Comments)     tested     Lactose Other (See Comments)     Contraindicated due to Thyroid, Hashimoto     Magnesium GI Disturbance     Other (Do Not Use) Anxiety, Other (See Comments), Diarrhea, Cramps and GI Disturbance     Hydroxizine name of medication     Remeron [Mirtazapine] Rash     Patient states that medication made her extremly tired, felt like she could not function until late afternoon.  Also noted having rash all over body         Lab Results    Chemistry/lipid CBC Cardiac Enzymes/BNP/TSH/INR   Lab Results   Component Value Date    CHOL 175 03/28/2024    HDL 50 03/28/2024    TRIG 113 03/28/2024    BUN 24.3 (H) 05/11/2024     05/11/2024    CO2 29 05/11/2024    Lab Results   Component Value Date    WBC 5.1 05/11/2024    HGB 13.5 05/11/2024    HCT 41.3 05/11/2024    MCV 95 05/11/2024     05/11/2024    Lab Results   Component Value Date    TSH 1.88 05/10/2024                                                Thank you for allowing me to participate in the care of your patient.      Sincerely,     Eugene Campbell MD     [facility]  cc:   Eugene SHORE  MD Adrian  [address]  [place] [address]

## 2024-08-26 NOTE — PROGRESS NOTES
"    [facility]  [phone]          Assessment/Recommendations   Patient with known history of probable coronary artery dissection, mild carotid plaque, and in general has had low cholesterol so is not been placed on statins.  Most recent lipid panel showed LDL cholesterol above 100 and I would like her LDL cholesterol closer to 70.  She feels like she was very stressed at the time of that draw and would like to repeat it so we will get a fasting lipid panel today as she came in fasting.    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    Blood pressure is at goal and she takes an antiplatelet agent.    Will repeat a carotid ultrasound as she did have some mild plaque in the past.       History of Present Illness/Subjective    Ms. Davida Mo is a 63 year old female with known history of myocardial infarction with suspicion of coronary artery dissection.  She has been feeling well this past year.  She had 1 episode where she had some discomfort in her upper chest that went up towards her ears and had an evaluation emergency department and was unremarkable.  She was having stress and more GI things at that time which she feels was the etiology.  She has been working in the garden vigorously and not having any chest discomfort, unusual shortness of breath.  She denies orthopnea, paroxysmal nocturnal dyspnea, peripheral edema, syncope or near syncopal episodes.  No palpitations.    She does have a history of some plaquing in her carotid arteries has not had a carotid ultrasound since 2015.    ECG: Personally reviewed.        Physical Examination Review of Systems   /82 (BP Location: Left arm, Patient Position: Sitting, Cuff Size: Adult Regular)   Pulse 64   Resp 18   Ht 1.575 m (5' 2\")   Wt 55.3 kg (122 lb)   SpO2 97%   BMI 22.31 kg/m    Body mass index is 22.31 kg/m .  Wt " "Readings from Last 3 Encounters:   08/26/24 55.3 kg (122 lb)   05/28/24 55.8 kg (123 lb)   05/11/24 56.7 kg (125 lb)     General Appearance:   Alert, cooperative and in no acute distress.   ENT/Mouth: Pink/moist oral mucosa   EYES:  no scleral icterus, normal conjunctivae   Neck: JVP normal. No Hepatojugular reflux. Thyroid not visualized.   Chest/Lungs:   Lungs are clear to auscultation, equal chest wall expansion.   Cardiovascular:   S1, S2 without murmur ,clicks or rubs. Brachial, radial and posterior tibial pulses are intact and symetric. No carotid bruits noted   Abdomen:  Nontender.    Extremities: No cyanosis, clubbing or edema   Skin: no xanthelasma, warm.    Neurologic: normal arm movement bilateral, no tremors     Psychiatric: Appropriate affect.      Enc Vitals  BP: 134/82  Pulse: 64  Resp: 18  SpO2: 97 %  Weight: 55.3 kg (122 lb)  Height: 157.5 cm (5' 2\")                                           Medical History  Surgical History Family History Social History   Past Medical History:   Diagnosis Date    Arthritis     Depression     on meds from 96 to 06. no symptoms have returned    Hypertension     IBS (irritable bowel syndrome)     Motion sickness     Need for prophylactic hormone replacement therapy (postmenopausal)     NSTEMI (non-ST elevated myocardial infarction) (H) 2011,9/23/2014    see care everywhere    Prinzmetal angina (H24)     first heart attack. prinzmetal angina diagnosis    Thyroid disease     Past Surgical History:   Procedure Laterality Date    BUNIONECTOMY RT/LT      DILATION AND CURETTAGE      DILATION AND CURETTAGE, OPERATIVE HYSTEROSCOPY, COMBINED N/A 12/20/2021    Procedure: HYSTEROSCOPY, POLYPECTOMY;  Surgeon: Sebastian Mcguire MD;  Location: [facility] OR    TUBAL LIGATION      TUBAL LIGATION      Family History   Problem Relation Age of Onset    Thyroid Disease Sister     Lung Cancer Sister 67        Lung Cancer, removal of left lower lung    Diabetes Mother     " Hypertension Mother     Allergies Mother     Arthritis Mother     Eye Disorder Mother     Gastrointestinal Disease Mother         diverticulitis    Osteoporosis Mother         and spinal stenosis    Asthma Mother     Atrial fibrillation Mother     Diverticulitis Mother     Diabetes Type 2  Mother 70.00    Heart Disease Father     Alcohol/Drug Father     Irritable Bowel Syndrome Father         possibly    Heart Failure Father 71.00    Cancer Sister     Neurologic Disorder Sister         brain tumor / cancer    Bone Cancer Sister     Cancer Sister     Parkinsonism Brother     Breast Cancer No family hx of     Social History     Socioeconomic History    Marital status:      Spouse name: Not on file    Number of children: Not on file    Years of education: Not on file    Highest education level: Not on file   Occupational History    Not on file   Tobacco Use    Smoking status: Former     Current packs/day: 0.00     Types: Cigarettes     Start date: 9/10/1972     Quit date: 1991     Years since quittin.9    Smokeless tobacco: Never   Vaping Use    Vaping status: Never Used   Substance and Sexual Activity    Alcohol use: Yes     Comment: Occasionally    Drug use: No    Sexual activity: Yes     Partners: Male     Birth control/protection: None   Other Topics Concern    Parent/sibling w/ CABG, MI or angioplasty before 65F 55M? Not Asked   Social History Narrative    Not on file     Social Determinants of Health     Financial Resource Strain: Low Risk  (2024)    Financial Resource Strain     Within the past 12 months, have you or your family members you live with been unable to get utilities (heat, electricity) when it was really needed?: No   Food Insecurity: Low Risk  (2024)    Food Insecurity     Within the past 12 months, did you worry that your food would run out before you got money to buy more?: No     Within the past 12 months, did the food you bought just not last and you didn t have money  to get more?: No   Transportation Needs: Low Risk  (4/8/2024)    Transportation Needs     Within the past 12 months, has lack of transportation kept you from medical appointments, getting your medicines, non-medical meetings or appointments, work, or from getting things that you need?: No   Physical Activity: Unknown (4/8/2024)    Exercise Vital Sign     Days of Exercise per Week: 1 day     Minutes of Exercise per Session: Not on file   Stress: Stress Concern Present (4/8/2024)    [employer] - Occupational Stress Questionnaire     Feeling of Stress : To some extent   Social Connections: Unknown (4/8/2024)    Social Connection and Isolation Panel [NHANES]     Frequency of Communication with Friends and Family: Not on file     Frequency of Social Gatherings with Friends and Family: Once a week     Attends Sikh Services: Not on file     Active Member of Clubs or Organizations: Not on file     Attends Club or Organization Meetings: Not on file     Marital Status: Not on file   Interpersonal Safety: Not on file   Housing Stability: Low Risk  (4/8/2024)    Housing Stability     Do you have housing? : Yes     Are you worried about losing your housing?: No          Medications  Allergies   Current Outpatient Medications   Medication Sig Dispense Refill    aspirin 81 MG EC tablet Take 81 mg by mouth daily 90 tablet 3    CALCIUM PO Take 2 tablets by mouth daily      diltiazem ER (DILT-XR) 180 MG 24 hr capsule TAKE 1 CAPSULE BY MOUTH EVERY DAY 90 capsule 3    estradiol (CLIMARA) 0.05 MG/24HR weekly patch APPLY 1 PATCH TO SKIN TRANSDERMALLY WEEKLY      L-Tryptophan 500 MG TABS Take 500 mg by mouth 2 times daily      LORazepam (ATIVAN) 0.5 MG tablet Take 1 at onset of panic attack. May repeat in 8 hours if needed. Max 20 tabs per month 20 tablet 0    metoprolol succinate ER (TOPROL XL) 50 MG 24 hr tablet TAKE 1 TABLET BY MOUTH EVERY DAY 90 tablet 1    OVER-THE-COUNTER Take 200 mg by mouth 3  times daily L-theanine      progesterone (PROMETRIUM) 100 MG capsule TAKE 1 CAPSULE ORALLY ONCE A DAY      thyroid (NP THYROID) 30 MG tablet Take 30 mg by mouth daily      Ubiquinol 100 MG CAPS Take 1 capsule by mouth daily      STATIN NOT PRESCRIBED (INTENTIONAL) Please choose reason not prescribed from choices below. (Patient not taking: Reported on 4/19/2023)      Allergies   Allergen Reactions    Kiwi Anaphylaxis    Milk Protein Other (See Comments)     Contraindicated due to Thyroid, Hashimoto    Egg White (Diagnostic) Other (See Comments)     tested    Lactose Other (See Comments)     Contraindicated due to Thyroid, Hashimoto    Magnesium GI Disturbance    Other (Do Not Use) Anxiety, Other (See Comments), Diarrhea, Cramps and GI Disturbance     Hydroxizine name of medication    Remeron [Mirtazapine] Rash     Patient states that medication made her extremly tired, felt like she could not function until late afternoon.  Also noted having rash all over body         Lab Results    Chemistry/lipid CBC Cardiac Enzymes/BNP/TSH/INR   Lab Results   Component Value Date    CHOL 175 03/28/2024    HDL 50 03/28/2024    TRIG 113 03/28/2024    BUN 24.3 (H) 05/11/2024     05/11/2024    CO2 29 05/11/2024    Lab Results   Component Value Date    WBC 5.1 05/11/2024    HGB 13.5 05/11/2024    HCT 41.3 05/11/2024    MCV 95 05/11/2024     05/11/2024    Lab Results   Component Value Date    TSH 1.88 05/10/2024

## 2024-08-26 NOTE — LETTER
September 5, 2024      Davida EN[place]  [address] [address]        Dear ,    We are writing to inform you of your test results.  which is up from 5 months ago.  I would favor Rosuvastatin 5 mg a day.  Understood that wish to further consider addition of medication, please let us know if you wish to proceed.  Resulted Orders   Lipid panel reflex to direct LDL Fasting   Result Value Ref Range    Cholesterol 201 (H) <200 mg/dL    Triglycerides 85 <150 mg/dL    Direct Measure HDL 61 >=50 mg/dL    LDL Cholesterol Calculated 123 (H) <=100 mg/dL    Non HDL Cholesterol 140 (H) <130 mg/dL    Patient Fasting > 8hrs? Unknown     Narrative    Cholesterol  Desirable:  <200 mg/dL    Triglycerides  Normal:  Less than 150 mg/dL  Borderline High:  150-199 mg/dL  High:  200-499 mg/dL  Very High:  Greater than or equal to 500 mg/dL    Direct Measure HDL  Female:  Greater than or equal to 50 mg/dL   Male:  Greater than or equal to 40 mg/dL    LDL Cholesterol  Desirable:  <100mg/dL  Above Desirable:  100-129 mg/dL   Borderline High:  130-159 mg/dL   High:  160-189 mg/dL   Very High:  >= 190 mg/dL    Non HDL Cholesterol  Desirable:  130 mg/dL  Above Desirable:  130-159 mg/dL  Borderline High:  160-189 mg/dL  High:  190-219 mg/dL  Very High:  Greater than or equal to 220 mg/dL       If you have any questions or concerns, please call the clinic at the number listed above.       Sincerely,      Eugene Campbell MD

## 2024-08-27 ENCOUNTER — HOSPITAL ENCOUNTER (OUTPATIENT)
Dept: ULTRASOUND IMAGING | Facility: CLINIC | Age: 64
Discharge: HOME | End: 2024-08-27
Attending: INTERNAL MEDICINE | Admitting: INTERNAL MEDICINE
Payer: COMMERCIAL

## 2024-08-27 DIAGNOSIS — I20.1 PRINZMETAL ANGINA (H): ICD-10-CM

## 2024-08-27 DIAGNOSIS — I65.23 OBSTRUCTION OF CAROTID ARTERY ON BOTH SIDES: ICD-10-CM

## 2024-08-27 DIAGNOSIS — I25.42 DISSECTION OF CORONARY ARTERY: ICD-10-CM

## 2024-08-27 DIAGNOSIS — I10 ESSENTIAL HYPERTENSION: ICD-10-CM

## 2024-08-27 PROCEDURE — 93880 EXTRACRANIAL BILAT STUDY: CPT

## 2024-10-04 DIAGNOSIS — I10 ESSENTIAL HYPERTENSION: ICD-10-CM

## 2024-10-04 RX ORDER — METOPROLOL SUCCINATE 50 MG/1
TABLET, EXTENDED RELEASE ORAL
Qty: 90 TABLET | Refills: 2 | Status: SHIPPED | OUTPATIENT
Start: 2024-10-04

## 2024-10-14 ENCOUNTER — E-VISIT (OUTPATIENT)
Dept: URGENT CARE | Facility: CLINIC | Age: 64
End: 2024-10-14
Payer: COMMERCIAL

## 2024-10-14 ENCOUNTER — MYC MEDICAL ADVICE (OUTPATIENT)
Dept: FAMILY MEDICINE | Facility: CLINIC | Age: 64
End: 2024-10-14
Payer: COMMERCIAL

## 2024-10-14 DIAGNOSIS — J01.90 ACUTE SINUSITIS WITH SYMPTOMS > 10 DAYS: Primary | ICD-10-CM

## 2024-10-14 PROCEDURE — 99421 OL DIG E/M SVC 5-10 MIN: CPT | Performed by: FAMILY MEDICINE

## 2024-10-14 RX ORDER — BENZONATATE 100 MG/1
100 CAPSULE ORAL 3 TIMES DAILY PRN
Qty: 30 CAPSULE | Refills: 0 | Status: SHIPPED | OUTPATIENT
Start: 2024-10-14

## 2024-10-14 NOTE — PATIENT INSTRUCTIONS
If you're having a lot of sinus pressure and congestion with thick discharge, you may have a sinus infection  I have sent a prescription for augmentin.   Augmentin is prescribed which is a strong antibiotic recommended for sinus infections.  Strong antibiotics can make people prone to antibiotic associated diarrhea - one of which is Clostridium Difficile.  If you develop diarrhea- please stop the antibiotic and consult with your primary care provider    I also sent benzonatate.     Thank you for choosing us for your care. I have placed an order for a prescription so that you can start treatment. View your full visit summary for details by clicking on the link below. Your pharmacist will able to address any questions you may have about the medication.     If you're not feeling better within 5-7 days, please schedule an appointment.  You can schedule an appointment right here in [facility], or call [phone]  If the visit is for the same symptoms as your eVisit, we'll refund the cost of your eVisit if seen within seven days.     3 = A little assistance

## 2024-10-16 ENCOUNTER — TRANSFERRED RECORDS (OUTPATIENT)
Dept: MULTI SPECIALTY CLINIC | Facility: CLINIC | Age: 64
End: 2024-10-16

## 2024-11-06 ENCOUNTER — E-VISIT (OUTPATIENT)
Dept: URGENT CARE | Facility: CLINIC | Age: 64
End: 2024-11-06
Payer: COMMERCIAL

## 2024-11-06 DIAGNOSIS — H10.32 ACUTE CONJUNCTIVITIS OF LEFT EYE, UNSPECIFIED ACUTE CONJUNCTIVITIS TYPE: Primary | ICD-10-CM

## 2024-11-06 PROCEDURE — 99421 OL DIG E/M SVC 5-10 MIN: CPT | Performed by: NURSE PRACTITIONER

## 2024-11-06 RX ORDER — OFLOXACIN 3 MG/ML
SOLUTION/ DROPS OPHTHALMIC
Qty: 5 ML | Refills: 0 | Status: SHIPPED | OUTPATIENT
Start: 2024-11-06 | End: 2024-11-13

## 2024-11-06 NOTE — PATIENT INSTRUCTIONS
Thank you for choosing us for your care. I have placed an order for a prescription so that you can start treatment. View your full visit summary for details by clicking on the link below. Your pharmacist will able to address any questions you may have about the medication.     If you re not feeling better within 2-3 days, please schedule an appointment.  You can schedule an appointment right here in [facility], or call [phone]  If the visit is for the same symptoms as your eVisit, we ll refund the cost of your eVisit if seen within seven days.    Pinkeye: Care Instructions  Overview     Pinkeye is redness and swelling of the eye surface and the conjunctiva (the lining of the eyelid and the covering of the white part of the eye). Pinkeye is also called conjunctivitis. Pinkeye is often caused by infection with bacteria or a virus. Dry air, allergies, smoke, and chemicals are other common causes.  Pinkeye often gets better on its own in 7 to 10 days. Antibiotics only help if the pinkeye is caused by bacteria. Pinkeye caused by infection spreads easily. If an allergy or chemical is causing pinkeye, it will not go away unless you can avoid whatever is causing it.  Follow-up care is a key part of your treatment and safety. Be sure to make and go to all appointments, and call your doctor if you are having problems. It's also a good idea to know your test results and keep a list of the medicines you take.  How can you care for yourself at home?  Wash your hands often. Always wash them before and after you treat pinkeye or touch your eyes or face.  Use moist cotton or a clean, wet cloth to remove crust. Wipe from the inside corner of the eye to the outside. Use a clean part of the cloth for each wipe.  Put cold or warm wet cloths on your eye a few times a day if the eye hurts.  Do not wear contact lenses or eye makeup until the pinkeye is gone. Throw away any eye makeup you were using when you got pinkeye. Clean your  "contacts and storage case. If you wear disposable contacts, use a new pair when your eye has cleared and it is safe to wear contacts again.  If the doctor gave you antibiotic ointment or eyedrops, use them as directed. Use the medicine for as long as instructed, even if your eye starts looking better soon. Keep the bottle tip clean, and do not let it touch the eye area.  To put in eyedrops or ointment:  Tilt your head back, and pull your lower eyelid down with one finger.  Drop or squirt the medicine inside the lower lid.  Close your eye for 30 to 60 seconds to let the drops or ointment move around.  Do not touch the ointment or dropper tip to your eyelashes or any other surface.  Do not share towels, pillows, or washcloths while you have pinkeye.  When should you call for help?   Call your doctor now or seek immediate medical care if:    You have pain in your eye, not just irritation on the surface.     You have a change in vision or loss of vision.     You have an increase in discharge from the eye.     Your eye has not started to improve or begins to get worse within 48 hours after you start using antibiotics.     Pinkeye lasts longer than 7 days.   Watch closely for changes in your health, and be sure to contact your doctor if you have any problems.  Where can you learn more?  Go to https://www.[email or web address].net/patiented  Enter Y392 in the search box to learn more about \"Pinkeye: Care Instructions.\"  Current as of: June 5, 2023  Content Version: 14.2 2024 [facility] [employer].   Care instructions adapted under license by your healthcare professional. If you have questions about a medical condition or this instruction, always ask your healthcare professional. [employer] disclaims any warranty or liability for your use of this information.    "

## 2024-12-05 NOTE — TELEPHONE ENCOUNTER
Due for preventive after 4/19/24   [Severe Partial] : severe partial [Lifting] : lifting [Pulling/Pushing] : pulling/pushing [Reaching overhead] : reaching overhead [Reaching at/below shoulder level] : reaching at or below shoulder level [No Rx restrictions] : No Rx restrictions. [I provided the services listed above] :  I provided the services listed above.

## 2024-12-13 ENCOUNTER — MYC REFILL (OUTPATIENT)
Dept: FAMILY MEDICINE | Facility: CLINIC | Age: 64
End: 2024-12-13
Payer: COMMERCIAL

## 2024-12-13 DIAGNOSIS — F43.10 PTSD (POST-TRAUMATIC STRESS DISORDER): ICD-10-CM

## 2024-12-13 DIAGNOSIS — F41.9 ANXIETY: ICD-10-CM

## 2024-12-16 DIAGNOSIS — Z12.11 COLON CANCER SCREENING: ICD-10-CM

## 2024-12-16 RX ORDER — LORAZEPAM 0.5 MG/1
TABLET ORAL
Qty: 20 TABLET | Refills: 0 | Status: SHIPPED | OUTPATIENT
Start: 2024-12-16

## 2025-05-19 ENCOUNTER — PATIENT OUTREACH (OUTPATIENT)
Dept: CARE COORDINATION | Facility: CLINIC | Age: 65
End: 2025-05-19
Payer: COMMERCIAL

## 2025-05-21 ENCOUNTER — TRANSFERRED RECORDS (OUTPATIENT)
Dept: HEALTH INFORMATION MANAGEMENT | Facility: CLINIC | Age: 65
End: 2025-05-21
Payer: COMMERCIAL

## 2025-06-03 ENCOUNTER — MYC MEDICAL ADVICE (OUTPATIENT)
Dept: FAMILY MEDICINE | Facility: CLINIC | Age: 65
End: 2025-06-03
Payer: COMMERCIAL

## 2025-07-01 DIAGNOSIS — I10 ESSENTIAL HYPERTENSION: ICD-10-CM

## 2025-07-01 RX ORDER — DILTIAZEM HYDROCHLORIDE 180 MG/1
180 CAPSULE, EXTENDED RELEASE ORAL DAILY
Qty: 90 CAPSULE | Refills: 0 | OUTPATIENT
Start: 2025-07-01

## 2025-07-17 ENCOUNTER — MYC REFILL (OUTPATIENT)
Dept: FAMILY MEDICINE | Facility: CLINIC | Age: 65
End: 2025-07-17
Payer: COMMERCIAL

## 2025-07-17 DIAGNOSIS — F43.10 PTSD (POST-TRAUMATIC STRESS DISORDER): ICD-10-CM

## 2025-07-17 DIAGNOSIS — F41.9 ANXIETY: ICD-10-CM

## 2025-07-17 RX ORDER — LORAZEPAM 0.5 MG/1
TABLET ORAL
Qty: 20 TABLET | Refills: 0 | Status: SHIPPED | OUTPATIENT
Start: 2025-07-17

## 2025-09-03 ENCOUNTER — OFFICE VISIT (OUTPATIENT)
Dept: CARDIOLOGY | Facility: CLINIC | Age: 65
End: 2025-09-03
Payer: COMMERCIAL

## 2025-09-03 VITALS
SYSTOLIC BLOOD PRESSURE: 128 MMHG | RESPIRATION RATE: 14 BRPM | DIASTOLIC BLOOD PRESSURE: 80 MMHG | WEIGHT: 126 LBS | BODY MASS INDEX: 23.04 KG/M2 | HEART RATE: 62 BPM

## 2025-09-03 DIAGNOSIS — I25.42 DISSECTION OF CORONARY ARTERY: Primary | ICD-10-CM

## 2025-09-03 DIAGNOSIS — I10 ESSENTIAL HYPERTENSION: ICD-10-CM

## 2025-09-03 LAB
ALBUMIN SERPL BCG-MCNC: 4.4 G/DL (ref 3.5–5.2)
ALP SERPL-CCNC: 88 U/L (ref 40–150)
ALT SERPL W P-5'-P-CCNC: 19 U/L (ref 0–50)
ANION GAP SERPL CALCULATED.3IONS-SCNC: 10 MMOL/L (ref 7–15)
AST SERPL W P-5'-P-CCNC: 24 U/L (ref 0–45)
BILIRUB SERPL-MCNC: 0.3 MG/DL
BUN SERPL-MCNC: 20.2 MG/DL (ref 8–23)
CALCIUM SERPL-MCNC: 9.5 MG/DL (ref 8.8–10.4)
CHLORIDE SERPL-SCNC: 103 MMOL/L (ref 98–107)
CHOLEST SERPL-MCNC: 221 MG/DL
CREAT SERPL-MCNC: 0.88 MG/DL (ref 0.51–0.95)
EGFRCR SERPLBLD CKD-EPI 2021: 73 ML/MIN/1.73M2
FASTING STATUS PATIENT QL REPORTED: YES
FASTING STATUS PATIENT QL REPORTED: YES
GLUCOSE SERPL-MCNC: 101 MG/DL (ref 70–99)
HCO3 SERPL-SCNC: 27 MMOL/L (ref 22–29)
HDLC SERPL-MCNC: 68 MG/DL
LDLC SERPL CALC-MCNC: 134 MG/DL
NONHDLC SERPL-MCNC: 153 MG/DL
POTASSIUM SERPL-SCNC: 4.4 MMOL/L (ref 3.4–5.3)
PROT SERPL-MCNC: 6.8 G/DL (ref 6.4–8.3)
SODIUM SERPL-SCNC: 140 MMOL/L (ref 135–145)
TRIGL SERPL-MCNC: 94 MG/DL

## 2025-09-03 PROCEDURE — 80053 COMPREHEN METABOLIC PANEL: CPT | Performed by: INTERNAL MEDICINE

## 2025-09-03 PROCEDURE — 3079F DIAST BP 80-89 MM HG: CPT | Performed by: INTERNAL MEDICINE

## 2025-09-03 PROCEDURE — 3074F SYST BP LT 130 MM HG: CPT | Performed by: INTERNAL MEDICINE

## 2025-09-03 PROCEDURE — 99214 OFFICE O/P EST MOD 30 MIN: CPT | Performed by: INTERNAL MEDICINE

## 2025-09-03 PROCEDURE — 36415 COLL VENOUS BLD VENIPUNCTURE: CPT | Performed by: INTERNAL MEDICINE

## 2025-09-03 PROCEDURE — 80061 LIPID PANEL: CPT | Performed by: INTERNAL MEDICINE

## 2025-09-03 PROCEDURE — G2211 COMPLEX E/M VISIT ADD ON: HCPCS | Performed by: INTERNAL MEDICINE
